# Patient Record
Sex: FEMALE | Race: WHITE | NOT HISPANIC OR LATINO | Employment: OTHER | URBAN - METROPOLITAN AREA
[De-identification: names, ages, dates, MRNs, and addresses within clinical notes are randomized per-mention and may not be internally consistent; named-entity substitution may affect disease eponyms.]

---

## 2018-06-27 PROBLEM — C78.6 OMENTAL METASTASIS: Status: ACTIVE | Noted: 2018-06-27

## 2018-06-27 PROBLEM — R18.0 MALIGNANT ASCITES: Status: ACTIVE | Noted: 2018-06-27

## 2018-08-06 PROBLEM — R45.89 ANXIETY ABOUT HEALTH: Status: ACTIVE | Noted: 2018-08-06

## 2018-08-06 PROBLEM — F41.8 ANXIETY ABOUT HEALTH: Status: ACTIVE | Noted: 2018-08-06

## 2018-08-07 PROBLEM — D70.1 CHEMOTHERAPY-INDUCED NEUTROPENIA: Status: ACTIVE | Noted: 2018-08-07

## 2018-08-09 PROBLEM — G89.3 CANCER RELATED PAIN: Status: ACTIVE | Noted: 2018-08-09

## 2019-02-07 PROBLEM — R10.30 LOWER ABDOMINAL PAIN: Status: ACTIVE | Noted: 2019-02-07

## 2019-08-06 PROBLEM — R10.30 LOWER ABDOMINAL PAIN: Status: RESOLVED | Noted: 2019-02-07 | Resolved: 2019-08-06

## 2019-11-13 PROBLEM — E55.9 VITAMIN D DEFICIENCY: Status: ACTIVE | Noted: 2018-02-05

## 2019-11-13 PROBLEM — E53.8 VITAMIN B12 DEFICIENCY: Status: ACTIVE | Noted: 2018-02-05

## 2019-11-13 PROBLEM — N28.9 RENAL INSUFFICIENCY: Status: ACTIVE | Noted: 2018-03-21

## 2019-11-13 PROBLEM — J06.0 ACUTE LARYNGOPHARYNGITIS: Status: ACTIVE | Noted: 2019-11-13

## 2019-11-13 PROBLEM — E78.2 MIXED HYPERLIPIDEMIA: Status: ACTIVE | Noted: 2018-03-21

## 2021-03-03 ENCOUNTER — TELEPHONE (OUTPATIENT)
Dept: GYNECOLOGIC ONCOLOGY | Facility: CLINIC | Age: 71
End: 2021-03-03

## 2021-03-03 NOTE — TELEPHONE ENCOUNTER
Caller: PATIENT    Relationship to patient:     Best call back number: [phone]    Chief complaint: PATIENT WAS SUPPOSE TO GET HER PORT OUT IN JAN, BUT DECLINE DUE TO JUST HAVING SURGERY, SHE WAS SUPPOSE TO GET A CALL BACK TO SCHEDULE AND HAS NOT, PLEASE ADVISE?    Type of visit:     Requested date: NEXT AVAILABLE    If rescheduling, when is the original appointment:     Additional notes:

## 2021-03-29 ENCOUNTER — TELEPHONE (OUTPATIENT)
Dept: ONCOLOGY | Facility: CLINIC | Age: 71
End: 2021-03-29

## 2021-03-29 NOTE — TELEPHONE ENCOUNTER
Caller: FABIAN ASHER    Relationship to patient: SELF    Best call back number: [phone]    Patient is needing: HAVING PORT TAKEN OUT 4-. PT WAS SUPPOSED TO HAVE COVID-19 TEST ON 4-9-2021 AND HAS NOT HEARD ANYTHING. CAN SHE GET A FOLLOW UP PHONE CALL TO GIVE HER THE DETAILS?

## 2021-03-30 NOTE — TELEPHONE ENCOUNTER
I had faxed the order to [facility]. I will re fax it and call them to confirm receipt.

## 2021-08-19 ENCOUNTER — TELEPHONE (OUTPATIENT)
Dept: GYNECOLOGIC ONCOLOGY | Facility: CLINIC | Age: 71
End: 2021-08-19

## 2021-08-19 DIAGNOSIS — C56.3 OVARIAN CANCER, BILATERAL: Primary | ICD-10-CM

## 2021-08-19 NOTE — TELEPHONE ENCOUNTER
RN called at spoke with pt.  Pt stated that she is having mild pain/discomfort in her LLQ.  Stated that is it just enough to know that it is there.  She knows that she has bowel issues and has been attributing it to that but then got worried about reoccurrence and wanted to know what she should be concerned about.    RN spoke with Tran NEWMAN who stated that she presented with pretty severe ascites and that if a reoccurrence was to happen that ascites would probably come back pretty quickly.  TRENT would like a Ca125 drawn to just check and can continue to plan from there.    RN relayed above recommendations and pt stated that she would be in [place] on Monday 8/23 and could come by the lab to get it drawn then.

## 2021-08-19 NOTE — TELEPHONE ENCOUNTER
Caller: Eugenia Tavares    Relationship: Self    Best call back number:[phone]    What is the best time to reach you:     ANYTIME     Who are you requesting to speak with (clinical staff, provider,  specific staff member): DR RAMIREZ NURSE    Do you know the name of the person who called: EUGENIA    What was the call regarding:   HAD OVARIAN CANCER AND HAVING A FEW THINGS NEEDS TO DISCUSS WITH THE NURSE    Do you require a callback: YES

## 2022-06-07 ENCOUNTER — TELEPHONE (OUTPATIENT)
Dept: GYNECOLOGIC ONCOLOGY | Facility: CLINIC | Age: 72
End: 2022-06-07

## 2022-06-07 NOTE — TELEPHONE ENCOUNTER
Caller: Eugenia Tavares    Relationship: Self    Best call back number: [phone]    What is the best time to reach you: ASAP    Who are you requesting to speak with (clinical staff, provider,  specific staff member): LEONOR, SAW HER LAST MONTH.    Do you know the name of the person who called: EUGENIA    What was the call regarding: EUGENIA STATES SHE IS CONCERNED ABOUT HER CA-125 & AFTER TALKING TO TYLER SHE FEELS LIKE SHE IS HAVING WEIRD FEELINGS IN HER ABDOMEN & IS INQUIRING IF SHE COULD GET A CT SCAN FOR PIECE OF MIND BEFORE SHE LEAVES FOR A TRIP TO [place] IN SEPT. BUT NEEDS TO PAY AHEAD OF TIME, GOING TO [place] AT THE END OF THIS MONTH.      Do you require a callback: YES, PLEASE TO DISCUSS.

## 2022-11-02 ENCOUNTER — TELEPHONE (OUTPATIENT)
Dept: GYNECOLOGIC ONCOLOGY | Facility: CLINIC | Age: 72
End: 2022-11-02

## 2022-11-02 NOTE — TELEPHONE ENCOUNTER
Pharmacy Name: [facility]     Pharmacy representative name: ELEAZAR    Pharmacy representative phone number: [phone]    What medication are you calling in regards to: POTASSIUM CHLORIDE    What question does the pharmacy have: NEED TO CLARIFY IF RX IS STILL NEEDED AND DOSAGE

## 2023-01-03 DIAGNOSIS — C56.3 OVARIAN CANCER, BILATERAL: Primary | ICD-10-CM

## 2023-01-04 ENCOUNTER — HOSPITAL ENCOUNTER (OUTPATIENT)
Dept: ONCOLOGY | Facility: HOSPITAL | Age: 73
Setting detail: INFUSION SERIES
Discharge: HOME | End: 2023-01-04
Payer: MEDICARE

## 2023-01-04 ENCOUNTER — LAB (OUTPATIENT)
Dept: LAB | Facility: HOSPITAL | Age: 73
End: 2023-01-04
Payer: MEDICARE

## 2023-01-04 ENCOUNTER — TELEPHONE (OUTPATIENT)
Dept: GYNECOLOGIC ONCOLOGY | Facility: CLINIC | Age: 73
End: 2023-01-04

## 2023-01-04 ENCOUNTER — OFFICE VISIT (OUTPATIENT)
Dept: GYNECOLOGIC ONCOLOGY | Facility: CLINIC | Age: 73
End: 2023-01-04
Payer: MEDICARE

## 2023-01-04 VITALS
TEMPERATURE: 97.3 F | OXYGEN SATURATION: 99 % | HEIGHT: 59 IN | RESPIRATION RATE: 20 BRPM | SYSTOLIC BLOOD PRESSURE: 142 MMHG | WEIGHT: 108.2 LBS | BODY MASS INDEX: 21.81 KG/M2 | DIASTOLIC BLOOD PRESSURE: 82 MMHG | HEART RATE: 88 BPM

## 2023-01-04 VITALS — DIASTOLIC BLOOD PRESSURE: 61 MMHG | SYSTOLIC BLOOD PRESSURE: 148 MMHG | HEART RATE: 66 BPM

## 2023-01-04 DIAGNOSIS — C56.3 OVARIAN CANCER, BILATERAL: ICD-10-CM

## 2023-01-04 DIAGNOSIS — G62.0 CHEMOTHERAPY-INDUCED NEUROPATHY: ICD-10-CM

## 2023-01-04 DIAGNOSIS — T45.1X5A CHEMOTHERAPY-INDUCED NEUROPATHY: ICD-10-CM

## 2023-01-04 DIAGNOSIS — R05.8 OTHER COUGH: ICD-10-CM

## 2023-01-04 DIAGNOSIS — C56.3 OVARIAN CANCER, BILATERAL: Primary | ICD-10-CM

## 2023-01-04 LAB
ALBUMIN SERPL-MCNC: 4.1 G/DL (ref 3.5–5.2)
ALBUMIN/GLOB SERPL: 1.1 G/DL
ALP SERPL-CCNC: 26 U/L (ref 39–117)
ALT SERPL W P-5'-P-CCNC: 16 U/L (ref 1–33)
ANION GAP SERPL CALCULATED.3IONS-SCNC: 11 MMOL/L (ref 5–15)
AST SERPL-CCNC: 18 U/L (ref 1–32)
BASOPHILS # BLD AUTO: 0.02 10*3/MM3 (ref 0–0.2)
BASOPHILS NFR BLD AUTO: 0.2 % (ref 0–1.5)
BILIRUB SERPL-MCNC: 0.2 MG/DL (ref 0–1.2)
BUN SERPL-MCNC: 11 MG/DL (ref 8–23)
BUN/CREAT SERPL: 15.5 (ref 7–25)
CALCIUM SPEC-SCNC: 9.9 MG/DL (ref 8.6–10.5)
CANCER AG125 SERPL QL: 26.9 U/ML (ref 0–38.1)
CHLORIDE SERPL-SCNC: 101 MMOL/L (ref 98–107)
CO2 SERPL-SCNC: 30 MMOL/L (ref 22–29)
CREAT SERPL-MCNC: 0.71 MG/DL (ref 0.57–1)
DEPRECATED RDW RBC AUTO: 45.9 FL (ref 37–54)
EGFRCR SERPLBLD CKD-EPI 2021: 90.5 ML/MIN/1.73
EOSINOPHIL # BLD AUTO: 0.11 10*3/MM3 (ref 0–0.4)
EOSINOPHIL NFR BLD AUTO: 1.2 % (ref 0.3–6.2)
ERYTHROCYTE [DISTWIDTH] IN BLOOD BY AUTOMATED COUNT: 13.5 % (ref 12.3–15.4)
GLOBULIN UR ELPH-MCNC: 3.9 GM/DL
GLUCOSE SERPL-MCNC: 101 MG/DL (ref 65–99)
HCT VFR BLD AUTO: 43.3 % (ref 34–46.6)
HGB BLD-MCNC: 14.1 G/DL (ref 12–15.9)
IMM GRANULOCYTES # BLD AUTO: 0.22 10*3/MM3 (ref 0–0.05)
IMM GRANULOCYTES NFR BLD AUTO: 2.5 % (ref 0–0.5)
LYMPHOCYTES # BLD AUTO: 1.65 10*3/MM3 (ref 0.7–3.1)
LYMPHOCYTES NFR BLD AUTO: 18.5 % (ref 19.6–45.3)
MCH RBC QN AUTO: 30.2 PG (ref 26.6–33)
MCHC RBC AUTO-ENTMCNC: 32.6 G/DL (ref 31.5–35.7)
MCV RBC AUTO: 92.7 FL (ref 79–97)
MONOCYTES # BLD AUTO: 0.65 10*3/MM3 (ref 0.1–0.9)
MONOCYTES NFR BLD AUTO: 7.3 % (ref 5–12)
NEUTROPHILS NFR BLD AUTO: 6.28 10*3/MM3 (ref 1.7–7)
NEUTROPHILS NFR BLD AUTO: 70.3 % (ref 42.7–76)
PLATELET # BLD AUTO: 292 10*3/MM3 (ref 140–450)
PMV BLD AUTO: 9.2 FL (ref 6–12)
POTASSIUM SERPL-SCNC: 4 MMOL/L (ref 3.5–5.2)
PROT SERPL-MCNC: 8 G/DL (ref 6–8.5)
RBC # BLD AUTO: 4.67 10*6/MM3 (ref 3.77–5.28)
SODIUM SERPL-SCNC: 142 MMOL/L (ref 136–145)
WBC NRBC COR # BLD: 8.93 10*3/MM3 (ref 3.4–10.8)

## 2023-01-04 PROCEDURE — 96417 CHEMO IV INFUS EACH ADDL SEQ: CPT

## 2023-01-04 PROCEDURE — 25010000002 FOSAPREPITANT PER 1 MG: Performed by: NURSE PRACTITIONER

## 2023-01-04 PROCEDURE — 96415 CHEMO IV INFUSION ADDL HR: CPT

## 2023-01-04 PROCEDURE — 96375 TX/PRO/DX INJ NEW DRUG ADDON: CPT

## 2023-01-04 PROCEDURE — 80053 COMPREHEN METABOLIC PANEL: CPT

## 2023-01-04 PROCEDURE — 25010000002 DIPHENHYDRAMINE PER 50 MG: Performed by: NURSE PRACTITIONER

## 2023-01-04 PROCEDURE — 25010000002 HEPARIN LOCK FLUSH PER 10 UNITS: Performed by: OBSTETRICS & GYNECOLOGY

## 2023-01-04 PROCEDURE — 25010000002 PALONOSETRON 0.25 MG/5ML SOLUTION PREFILLED SYRINGE: Performed by: NURSE PRACTITIONER

## 2023-01-04 PROCEDURE — 96413 CHEMO IV INFUSION 1 HR: CPT

## 2023-01-04 PROCEDURE — 25010000002 CARBOPLATIN PER 50 MG: Performed by: NURSE PRACTITIONER

## 2023-01-04 PROCEDURE — 25010000002 PACLITAXEL PER 1 MG: Performed by: NURSE PRACTITIONER

## 2023-01-04 PROCEDURE — 86304 IMMUNOASSAY TUMOR CA 125: CPT

## 2023-01-04 PROCEDURE — 99214 OFFICE O/P EST MOD 30 MIN: CPT | Performed by: NURSE PRACTITIONER

## 2023-01-04 PROCEDURE — 1159F MED LIST DOCD IN RCRD: CPT | Performed by: NURSE PRACTITIONER

## 2023-01-04 PROCEDURE — 25010000002 DEXAMETHASONE PER 1 MG: Performed by: NURSE PRACTITIONER

## 2023-01-04 PROCEDURE — 85025 COMPLETE CBC W/AUTO DIFF WBC: CPT

## 2023-01-04 PROCEDURE — 1126F AMNT PAIN NOTED NONE PRSNT: CPT | Performed by: NURSE PRACTITIONER

## 2023-01-04 PROCEDURE — 36415 COLL VENOUS BLD VENIPUNCTURE: CPT

## 2023-01-04 PROCEDURE — 96374 THER/PROPH/DIAG INJ IV PUSH: CPT

## 2023-01-04 PROCEDURE — 1160F RVW MEDS BY RX/DR IN RCRD: CPT | Performed by: NURSE PRACTITIONER

## 2023-01-04 PROCEDURE — 96365 THER/PROPH/DIAG IV INF INIT: CPT

## 2023-01-04 PROCEDURE — 96367 TX/PROPH/DG ADDL SEQ IV INF: CPT

## 2023-01-04 RX ORDER — HEPARIN SODIUM (PORCINE) LOCK FLUSH IV SOLN 100 UNIT/ML 100 UNIT/ML
500 SOLUTION INTRAVENOUS AS NEEDED
Status: DISCONTINUED | OUTPATIENT
Start: 2023-01-04 | End: 2023-01-05 | Stop reason: HOSPADM

## 2023-01-04 RX ORDER — GUAIFENESIN AND CODEINE PHOSPHATE 100; 10 MG/5ML; MG/5ML
5 SOLUTION ORAL 3 TIMES DAILY PRN
Qty: 118 ML | Refills: 0 | Status: SHIPPED | OUTPATIENT
Start: 2023-01-04

## 2023-01-04 RX ORDER — PALONOSETRON 0.05 MG/ML
0.25 INJECTION, SOLUTION INTRAVENOUS ONCE
Status: COMPLETED | OUTPATIENT
Start: 2023-01-04 | End: 2023-01-04

## 2023-01-04 RX ORDER — SODIUM CHLORIDE 9 MG/ML
250 INJECTION, SOLUTION INTRAVENOUS ONCE
Status: CANCELLED | OUTPATIENT
Start: 2023-01-04

## 2023-01-04 RX ORDER — LIDOCAINE AND PRILOCAINE 25; 25 MG/G; MG/G
CREAM TOPICAL
COMMUNITY
Start: 2022-12-13

## 2023-01-04 RX ORDER — HEPARIN SODIUM (PORCINE) LOCK FLUSH IV SOLN 100 UNIT/ML 100 UNIT/ML
500 SOLUTION INTRAVENOUS AS NEEDED
Status: CANCELLED | OUTPATIENT
Start: 2023-04-01

## 2023-01-04 RX ORDER — OLANZAPINE 5 MG/1
5 TABLET ORAL ONCE
Status: COMPLETED | OUTPATIENT
Start: 2023-01-04 | End: 2023-01-04

## 2023-01-04 RX ORDER — OLANZAPINE 5 MG/1
5 TABLET ORAL ONCE
Status: CANCELLED | OUTPATIENT
Start: 2023-01-04 | End: 2023-01-04

## 2023-01-04 RX ORDER — FAMOTIDINE 10 MG/ML
20 INJECTION, SOLUTION INTRAVENOUS AS NEEDED
Status: CANCELLED | OUTPATIENT
Start: 2023-01-04

## 2023-01-04 RX ORDER — SODIUM CHLORIDE 9 MG/ML
250 INJECTION, SOLUTION INTRAVENOUS ONCE
Status: COMPLETED | OUTPATIENT
Start: 2023-01-04 | End: 2023-01-04

## 2023-01-04 RX ORDER — DIPHENHYDRAMINE HYDROCHLORIDE 50 MG/ML
50 INJECTION INTRAMUSCULAR; INTRAVENOUS AS NEEDED
Status: CANCELLED | OUTPATIENT
Start: 2023-01-04

## 2023-01-04 RX ORDER — FAMOTIDINE 10 MG/ML
20 INJECTION, SOLUTION INTRAVENOUS ONCE
Status: COMPLETED | OUTPATIENT
Start: 2023-01-04 | End: 2023-01-04

## 2023-01-04 RX ORDER — FAMOTIDINE 10 MG/ML
20 INJECTION, SOLUTION INTRAVENOUS ONCE
Status: CANCELLED | OUTPATIENT
Start: 2023-01-04

## 2023-01-04 RX ORDER — SODIUM CHLORIDE 0.9 % (FLUSH) 0.9 %
10 SYRINGE (ML) INJECTION AS NEEDED
Status: CANCELLED | OUTPATIENT
Start: 2023-04-01

## 2023-01-04 RX ORDER — PALONOSETRON 0.05 MG/ML
0.25 INJECTION, SOLUTION INTRAVENOUS ONCE
Status: CANCELLED | OUTPATIENT
Start: 2023-01-04

## 2023-01-04 RX ADMIN — DIPHENHYDRAMINE HYDROCHLORIDE 50 MG: 50 INJECTION INTRAMUSCULAR; INTRAVENOUS at 10:30

## 2023-01-04 RX ADMIN — HEPARIN 500 UNITS: 100 SYRINGE at 15:19

## 2023-01-04 RX ADMIN — SODIUM CHLORIDE 250 ML: 9 INJECTION, SOLUTION INTRAVENOUS at 10:29

## 2023-01-04 RX ADMIN — DEXAMETHASONE SODIUM PHOSPHATE 20 MG: 4 INJECTION, SOLUTION INTRAMUSCULAR; INTRAVENOUS at 10:45

## 2023-01-04 RX ADMIN — OLANZAPINE 5 MG: 5 TABLET, FILM COATED ORAL at 10:44

## 2023-01-04 RX ADMIN — PACLITAXEL 245 MG: 6 INJECTION, SOLUTION INTRAVENOUS at 11:35

## 2023-01-04 RX ADMIN — CARBOPLATIN 380 MG: 600 INJECTION, SOLUTION INTRAVENOUS at 14:45

## 2023-01-04 RX ADMIN — PALONOSETRON 0.25 MG: 0.25 INJECTION, SOLUTION INTRAVENOUS at 11:03

## 2023-01-04 RX ADMIN — FOSAPREPITANT DIMEGLUMINE 150 MG: 150 INJECTION, POWDER, LYOPHILIZED, FOR SOLUTION INTRAVENOUS at 10:29

## 2023-01-04 RX ADMIN — FAMOTIDINE 20 MG: 10 INJECTION INTRAVENOUS at 11:06

## 2023-01-04 NOTE — TELEPHONE ENCOUNTER
----- Message from TRENT Combs sent at 1/4/2023 12:17 PM EST -----  Please notify patient Ca-125 back within normal limits. Good news! Thanks

## 2023-01-04 NOTE — PROGRESS NOTES
GYN ONCOLOGY FOLLOW-UP    Eugenia Tavares  [id number]  1950    Subjective   Chief Complaint: Ovarian Cancer and Chemotherapy        History of present illness:       Eugenia Tavares is a 72 y.o. year old female who is here today for recurrent ovarian cancer, biopsy-proven thoracic lymph node metastasis, chemotherapy toxicity evaluation, and consideration of ongoing treatment. She underwent her first cycle of second-line carboplatin + paclitaxel for recurrence on 12/13/2022. She presents today for consideration of cycle #2 day 1 of carbo/taxol, pending evaluation and labs. She has a port-a-cath in place. She is accompanied by her  and daughter today.     Patient reports having a respiratory illness in early November 2022, was treated with IM and PO antibiotics by her PCP. She reports her cough lingered, but she felt better until she began to feel poorly again over [place]. She describes worsening cough and drainage. Clinical staff recommended Mucinex DM which has helped and she is feeling some better. She has used Tessalon a few times, unsure if this this helping cough or now. She inquires about other options for cough treatment and what to do if symptoms return again.   Otherwise, she notes good appetite. Denies nausea and vomiting. Bowels and bladder normal. Neuropathy is overall stable. She reports some mild leg pain for a few days after treatment, but this was tolerable and has not persisted.       Oncology History:    Oncology/Hematology History   Ovarian cancer, bilateral (HCC)   6/22/2018 Initial Diagnosis    Abnormal examination by GI for patient c/o bloating and diarrhea, sent to GYN. TVUS concerning for malignancy. CT showed multiple cystic masses on bilateral ovaries, possible omental implants, and large volume ascites. Paracentesis performed and cytology returned positive for metastatic adenocarcinoma, favor GYN origin. CA-125 at diagnosis = 907.7. Referred to Gyn Oncology.      6/28/2018  Procedure    Port-a-cath placement     7/6/2018 - 8/16/2018 Chemotherapy    OP OVARIAN PACLitaxel / CARBOplatin (Q21D)  Neoadjuvant x 3 cycles     9/6/2018 Imaging    Interval CT showed improvement in ascites and resolution of previous left pleural effusion     9/11/2018 Surgery    Exploratory laparotomy, EMMA/BSO, debulking to R=0, and partial rectal resection. 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 Primary tumor site thought to be left fallopian tube. Bilateral ovarian, tubal, and surface involvement. Omentum, pelvic peritoneum, colon serosa, and rectal serosa involved. Macroscopic extrapelvic peritoneal implants also found. Stage IIIB grade 3         10/3/2018 - 11/29/2018 Chemotherapy    OP OVARIAN PACLitaxel / CARBOplatin AUC=6 / Bevacizumab 15 mg/kg  3 cycles adjuvant chemotherapy planned.     Patient chose to decline Avastin with all adjuvant cycles.     10/19/2018 Genetic Testing    Counseling and testing completed via [employer] panel. 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 Patient does not have disease, but is a carrier.     1/3/2019 Imaging    Post-treatment CT chest, abdomen, and pelvis. No ascites, nodularity, or residual disease seen. No evidence of progression or active malignancy.      3/21/2019 Imaging    CT chest, abdomen, pelvis for diffuse abdominal pain. Study negative for recurrent disease     12/29/2020 Imaging    CT chest, abdomen, pelvis prior to port removal:  Stable examination with no CT evidence of acute intrathoracic, intra-abdominal or pelvic abnormality. No evidence of progression or metastatic disease.     9/10/2021 Imaging    CT chest, abdomen, pelvis:  Stable appearance of the chest abdomen and pelvis without acute pathology or evidence for occurrence or active metastatic disease     6/22/2022 Imaging    CT chest, abdomen, pelvis:  No evidence of metastatic  disease within the abdomen or pelvis. Slowly enlarging bilateral pericardial fat pad lymph notes. Findings  are nonspecific given the slow progression since 2020, but are  concerning for metastatic disease or lymphoma.     8/22/2022 Progression    CT biopsy left pericardial lymph node. 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     9/20/2022 Molecular Testing    CARIS results:    PD-L1 positive  ER positive 2+, 75%  MSI stable, MMR proficient, MEGAN low, TMB low  PAMELA, BRCA 1/2, RAD51 C/D all negative  NE negative  Her2/Kike negative     12/13/2022 -  Chemotherapy    OP OVARIAN PACLitaxel / CARBOplatin (Q21D)         The current medication list and allergy list were reviewed and reconciled.     Past Medical History, Past Surgical History, Social History, Family History have been reviewed and are without significant changes except as mentioned.    Review of Systems   Constitutional: Negative.    Gastrointestinal: Negative.    Genitourinary: Negative.        Objective   Physical Exam  Vital Signs: /82   Pulse 88   Temp 97.3 °F (36.3 °C)   Resp 20   Ht 149.9 cm (59.02\")   Wt 49.1 kg (108 lb 3.2 oz)   SpO2 99%   BMI 21.84 kg/m²   Wt Readings from Last 3 Encounters:   01/04/23 49.1 kg (108 lb 3.2 oz)   12/13/22 49.4 kg (109 lb)   11/30/22 48.2 kg (106 lb 4.8 oz)     Vitals:    01/04/23 0901   PainSc: 0-No pain           General Appearance:  alert, cooperative, no apparent distress, appears stated age and normal weight   Neurologic/Psychiatric: A&O x 3, gait steady, appropriate affect   Lungs:   Clear to auscultation bilaterally; respirations regular, even, and unlabored bilaterally   Heart:  Regular rate and rhythm, no murmurs appreciated   Skin: No lesions, rashes, or discoloration   Extremities: Normal, atraumatic; no clubbing, cyanosis, or edema    Pelvic: deferred     ECOG score: 1             Result Review :   The following data was reviewed by: TRENT Combs on  01/04/2023:  CMP    CMP 11/28/22 12/12/22 1/4/23   Glucose  210 (A) 101 (A)   BUN  10 11   Creatinine 1.00 0.76 0.71   eGFR  83.4 90.5   Sodium  138 142   Potassium  4.4 4.0   Chloride  105 101   Calcium  9.1 9.9   Total Protein  6.9 8.0   Albumin  3.80 4.1   Globulin  3.1 3.9   Total Bilirubin  <0.2 0.2   Alkaline Phosphatase  21 (A) 26 (A)   AST (SGOT)  21 18   ALT (SGPT)  13 16   Albumin/Globulin Ratio  1.2 1.1   BUN/Creatinine Ratio  13.2 15.5   Anion Gap  9.4 11.0   (A) Abnormal value       Comments are available for some flowsheets but are not being displayed.           CBC w/diff    CBC w/Diff 8/22/22 12/12/22 1/4/23   WBC 5.79 8.82 8.93   RBC 4.58 4.49 4.67   Hemoglobin 14.2 13.6 14.1   Hematocrit 41.3 41.2 43.3   MCV 90.2 91.8 92.7   MCH 31.0 30.3 30.2   MCHC 34.4 33.0 32.6   RDW 12.7 13.8 13.5   Platelets 249 244 292   Neutrophil Rel % 62.2 94.4 (A) 70.3   Immature Granulocyte Rel % 0.3 0.3 2.5 (A)   Lymphocyte Rel % 27.3 4.3 (A) 18.5 (A)   Monocyte Rel % 8.3 0.9 (A) 7.3   Eosinophil Rel % 1.6 0.0 (A) 1.2   Basophil Rel % 0.3 0.1 0.2   (A) Abnormal value            Data reviewed: CARIS results   Last imaging study was CT chest, abdomen, pelvis 6/22/2022.   Tumor marker:     Date Value Ref Range Status   12/12/2022 46.9 (H) 0.0 - 38.1 U/mL Final   11/02/2022 42.4 (H) 0.0 - 38.1 U/mL Final   09/28/2022 40.5 (H) 0.0 - 38.1 U/mL Final         Procedure Notes:              Assessment and Plan:    Diagnoses and all orders for this visit:    1. Ovarian cancer, bilateral (HCC) (Primary)    2. Chemotherapy-induced neuropathy (HCC)    3. Other cough  -     guaiFENesin-codeine (GUAIFENESIN AC) 100-10 MG/5ML liquid; Take 5 mL by mouth 3 (Three) Times a Day As Needed for Cough.  Dispense: 118 mL; Refill: 0        This is a 72 y.o. woman with recurrent ovarian cancer, metastatic to thoracic lymph nodes, currently undergoing second-line chemotherapy with carboplatin + paclitaxel IV q 21 days      Recurrent Ovarian  Cancer  -initial diagnosis in 2018  -first line treatment included 3 cycles neoadjuvant carbo/taxol, interval optimal debulking, and 3 cycles adjuvant carbo/taxol  -recurrence found in 6/2022 in thoracic lymph nodes, biopsy proven in 8/2022  -platinum-sensitive, so initiated second line carbo/taxol on 12/13/2022  -tolerating treatment well overall thus far  -labs reviewed. OK to proceed with cycle #2 today. No dose reduction at this time.     Cough  -off and on since suffering URI in early 11/2022.   -continue Mucinex DM and Tessalon PRN  -recommended cough syrup for sleep and PRN. Rx for guaifenesin-codeine sent to local pharmacy. We reviewed medication instructions, risks, benefits, and potential side effects.   -encouraged patient and family to monitor for recurrent URI symptoms, fever, worsening cough. If new symptoms will check interval labs, repeat chest imaging    Chemotherapy-induced neuropathy  -mild at baseline since chemotherapy in 2018  -leg aching for a few days after infusion only  -recommended Claritin PRN leg/bone/joint pain      Pain assessment was performed today as a part of patient’s care. For patients with pain related to surgery, gynecologic malignancy or cancer treatment, the plan is as noted in the assessment/plan.  For patients with pain not related to these issues, they are to seek any further needed care from a more appropriate provider, such as PCP.      Follow-up:    Return to clinic in 3 weeks for chemotherapy toxicity evaluation.      Electronically signed by TRENT Combs on 01/04/23 at 12:36 EST

## 2023-01-05 ENCOUNTER — TELEPHONE (OUTPATIENT)
Dept: GYNECOLOGIC ONCOLOGY | Facility: CLINIC | Age: 73
End: 2023-01-05
Payer: MEDICARE

## 2023-01-05 NOTE — TELEPHONE ENCOUNTER
RN called pt w/ results.  Pt v/u.  Pt asked if she could put off her breast US/mamm until she was through treatment or at least a couple months.  RN spoke with Tran NEWMAN who stated it was ok to put it off a couple of months but to still get it done this year.  RN relayed message and pt v/u.

## 2023-01-23 DIAGNOSIS — C56.3 OVARIAN CANCER, BILATERAL: Primary | ICD-10-CM

## 2023-01-24 ENCOUNTER — TELEPHONE (OUTPATIENT)
Dept: GYNECOLOGIC ONCOLOGY | Facility: CLINIC | Age: 73
End: 2023-01-24
Payer: MEDICARE

## 2023-01-24 NOTE — TELEPHONE ENCOUNTER
Caller: Eugenia Tavares    Relationship: Self    Best call back number: [phone]    What is the best time to reach you: ANYTIME    Who are you requesting to speak with (clinical staff, provider,  specific staff member): ANTOINETTE       What was the call regarding: HAS APPT TOMORROW NEEDING TO KNOW IF NEEDS TO GO TO THE LAB OR INFUSION CENTER FOR BLOOD DRAW FIRST?    Do you require a callback: YES

## 2023-01-24 NOTE — PROGRESS NOTES
Eugenia CHRISTIE [facility]  [id number]  1950      Reason for visit: History of ovarian cancer now with biopsy-proven lymph node recurrence in the chest, chemotherapy consideration and toxicity assessment.     History of present illness:  The patient is a 72 y.o. year old female who presents today for treatment and evaluation of the above issues.    She is 4 years out from completion of treatment. Last  26.9 on 1/4. Had CT scan on 6/22 which showed slowly enlarging bilateral pericardial fat pad lymph notes. 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      She presents today for consideration for cycle #3 of Carboplatin/Paclitaxel. She reports tolerating the last cycle very well and has good energy. Her neuropathy in the feet and toes is stable and not significantly affecting her balance. She is going for walks most days. Denies chest pain, shortness of air, changes in bowel/bladder function, appetite, issues with her port. She and her  have questions regarding Bevacizumab.     Oncologic History:  Oncology/Hematology History   Ovarian cancer, bilateral (HCC)   6/22/2018 Initial Diagnosis    Abnormal examination by GI for patient c/o bloating and diarrhea, sent to GYN. TVUS concerning for malignancy. CT showed multiple cystic masses on bilateral ovaries, possible omental implants, and large volume ascites. Paracentesis performed and cytology returned positive for metastatic adenocarcinoma, favor GYN origin. CA-125 at diagnosis = 907.7. Referred to Gyn Oncology.      6/28/2018 Procedure    Port-a-cath placement     7/6/2018 - 8/16/2018 Chemotherapy    OP OVARIAN PACLitaxel / CARBOplatin (Q21D)  Neoadjuvant x 3 cycles     9/6/2018 Imaging    Interval CT showed improvement in ascites and resolution of previous left pleural effusion     9/11/2018 Surgery    Exploratory laparotomy, EMMA/BSO, debulking to R=0, and  partial rectal resection. 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 Primary tumor site thought to be left fallopian tube. Bilateral ovarian, tubal, and surface involvement. Omentum, pelvic peritoneum, colon serosa, and rectal serosa involved. Macroscopic extrapelvic peritoneal implants also found. Stage IIIB grade 3         10/3/2018 - 11/29/2018 Chemotherapy    OP OVARIAN PACLitaxel / CARBOplatin AUC=6 / Bevacizumab 15 mg/kg  3 cycles adjuvant chemotherapy planned.     Patient chose to decline Avastin with all adjuvant cycles.     10/19/2018 Genetic Testing    Counseling and testing completed via CancerNext panel. 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 Patient does not have disease, but is a carrier.     1/3/2019 Imaging    Post-treatment CT chest, abdomen, and pelvis. No ascites, nodularity, or residual disease seen. No evidence of progression or active malignancy.      3/21/2019 Imaging    CT chest, abdomen, pelvis for diffuse abdominal pain. Study negative for recurrent disease     12/29/2020 Imaging    CT chest, abdomen, pelvis prior to port removal:  Stable examination with no CT evidence of acute intrathoracic, intra-abdominal or pelvic abnormality. No evidence of progression or metastatic disease.     9/10/2021 Imaging    CT chest, abdomen, pelvis:  Stable appearance of the chest abdomen and pelvis without acute pathology or evidence for occurrence or active metastatic disease     6/22/2022 Imaging    CT chest, abdomen, pelvis:  No evidence of metastatic disease within the abdomen or pelvis. Slowly enlarging bilateral pericardial fat pad lymph notes. Findings  are nonspecific given the slow progression since 2020, but are  concerning for metastatic disease or lymphoma.     8/22/2022 Progression    CT biopsy left pericardial lymph node. Pathology returned compatible with papillary serous  carcinoma.     9/20/2022 Molecular Testing    CARIS results:    PD-L1 positive  ER positive 2+, 75%  MSI stable, MMR proficient, MEGAN low, TMB low  PAMELA, BRCA 1/2, RAD51 C/D all negative  OK negative  Her2/Kike negative     12/13/2022 -  Chemotherapy    OP OVARIAN PACLitaxel / CARBOplatin (Q21D)           Past Medical History:   Diagnosis Date   • Hypertension    • Ovarian cancer (HCC) 2018    chemotherapy    • Wears glasses        Past Surgical History:   Procedure Laterality Date   • APPENDECTOMY      possibly with tubal (not certain)   • BREAST BIOPSY Bilateral 1972    cysts removed    • COLONOSCOPY     • EXPLORATORY LAPAROTOMY, TOTAL ABDOMINAL HYSTERECTOMY SALPINGO OOPHORECTOMY N/A 09/11/2018    Procedure: LAPAROTOMY EXPLORATORY, TOTAL ABDOMINAL HYSTERECTOMY, BILATERAL SALPINGO OOPHORECTOMY, DEBULKING;  Surgeon: Shannan Bess MD;  Location: [facility];  Service: Gynecology   • HERNIA REPAIR Right     inguinal    • SUBLINGUAL SALIVARY CYST EXCISION Right 2020    at [facility]   • TUBAL ABDOMINAL LIGATION         MEDICATIONS: The current medication list was reviewed with the patient and updated in the EMR this date per the Medical Assistant. Medication dosages and frequencies were confirmed to be accurate.      Allergies:  has No Known Allergies.    Social History:   Social History     Socioeconomic History   • Marital status:    Tobacco Use   • Smoking status: Never   • Smokeless tobacco: Never   Vaping Use   • Vaping Use: Never used   Substance and Sexual Activity   • Alcohol use: No   • Drug use: No   • Sexual activity: Defer       Family History:    Family History   Adopted: Yes   Family history unknown: Yes       Health Maintenance:    Health Maintenance   Topic Date Due   • Pneumococcal Vaccine 65+ (1 - PCV) Never done   • TDAP/TD VACCINES (1 - Tdap) Never done   • ZOSTER VACCINE (1 of 2) Never done   • HEPATITIS C SCREENING  Never done   • ANNUAL WELLNESS VISIT  Never done   • LIPID PANEL  11/03/2021   •  COVID-19 Vaccine (4 - Booster for Pfizer series) 11/22/2021   • INFLUENZA VACCINE  08/01/2022   • DXA SCAN  03/22/2023   • MAMMOGRAM  01/27/2024   • COLORECTAL CANCER SCREENING  04/24/2028       Review of Systems  Please refer to history of present illness, review of systems otherwise negative.    Physical Exam    Vitals:    01/25/23 0929   BP: 164/87   Pulse: 77   Temp: 97.8 °F (36.6 °C)   TempSrc: Temporal   SpO2: 98%   Weight: 49.4 kg (108 lb 12.8 oz)   PainSc: 0-No pain       Body mass index is 21.96 kg/m².  Wt Readings from Last 3 Encounters:   01/25/23 49.4 kg (108 lb 12.8 oz)   01/04/23 49.1 kg (108 lb 3.2 oz)   12/13/22 49.4 kg (109 lb)       GENERAL: Alert, well-appearing female appearing her stated age who is in no apparent distress.   HEENT: Sclera anicteric. Head normocephalic, atraumatic.  Wearing mask.  NECK: Deferred  BREASTS: Deferred  CARDIOVASCULAR: Normal rate, regular rhythm.  No murmurs, rubs, gallops.  No peripheral edema.  RESPIRATORY: Lungs clear to auscultation bilaterally, normal respiratory effort on room air  BACK: Deferred  GASTROINTESTINAL: Deferred, no distention  SKIN:  Warm, dry, well-perfused.  All visible areas intact.  No rashes, lesions, ulcers.  PSYCHIATRIC: AO x3, with appropriate affect, normal thought processes.  Mood and affect appropriate.  NEUROLOGIC: No focal deficits.  Moves extremities well.  MUSCULOSKELETAL: Normal gait and station.   EXTREMITIES:   No cyanosis, clubbing, symmetric.  LYMPHATICS: Deferred     PELVIC exam:    Deferred    ECOG PS 0    PROCEDURES: None    Diagnostic Data:     No radiology results for the last 30 days.        Lab Results   Component Value Date    WBC 4.54 01/25/2023    HGB 12.9 01/25/2023    HCT 40.2 01/25/2023    MCV 94.6 01/25/2023     01/25/2023    NEUTROABS 2.32 01/25/2023    GLUCOSE 100 (H) 01/25/2023    BUN 7 (L) 01/25/2023    CREATININE 0.77 01/25/2023    EGFRIFNONA 70 11/29/2018     01/25/2023    K 4.3 01/25/2023    CL  103 01/25/2023    CO2 26.0 01/25/2023    MG 2.1 06/20/2018    PHOS 4.6 06/20/2018    CALCIUM 9.3 01/25/2023    ALBUMIN 3.9 01/25/2023    AST 22 01/25/2023    ALT 21 01/25/2023    BILITOT 0.3 01/25/2023     Lab Results   Component Value Date     26.9 01/04/2023     46.9 (H) 12/12/2022     42.4 (H) 11/02/2022     40.5 (H) 09/28/2022     33.2 05/17/2022     25.7 11/17/2021     30.5 08/23/2021     24.7 05/18/2021     21.6 02/09/2021     20.8 11/17/2020         Assessment & Plan   This is a 72 y.o. 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    Encounter Diagnoses   Name Primary?   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 OK to proceed with cycle #3 today. No dose reduction at this time.   - Additionally, we discussed Bevavizumab. Reviewed that adding Tiffani and continuing it as a maintenance therapy (for up to one year) has been shown to prolong the disease free interval when combined with cytotoxic chemotherapy as a second treatment regimen for recurrent ovarian cancer.  Typical toxicities were discussed including runny nose, nose bleeds, change in mental status, HTN, proteinuria, and bowel perforation. Patient desires to add Tiffani to her treatment plan and will start it with her next cycle.      Chemotherapy-induced neuropathy  -mild at baseline since chemotherapy in 2018  -stable      Pain assessment was performed today as a part of patient’s care. For patients with pain related to surgery, gynecologic  malignancy or cancer treatment, the plan is as noted in the assessment/plan.  For patients with pain not related to these issues, they are to seek any further needed care from a more appropriate provider, such as PCP.     Pain assessment was performed today as a part of patient’s care.  For patients with pain related to surgery, gynecologic malignancy or cancer treatment, the plan is as noted in the assessment/plan.  For patients with pain not related to these issues, they are to seek any further needed care from a more appropriate provider, such as PCP.      No orders of the defined types were placed in this encounter.    FOLLOW UP: 3 weeks    Fabiola Johnson MD  01/25/23  13:59 EDT    I spent 35 minutes caring for Eugenia on this date of service. This time includes time spent by me in the following activities: preparing for the visit, reviewing tests, performing a medically appropriate examination and/or evaluation, counseling and educating the patient/family/caregiver, ordering medications, tests, or procedures and documenting information in the medical record    Patient was seen and examined with Dr. Johnson,  resident, who performed portions of the examination and documentation for this patient's care under my direct supervision.  I agree with the above documentation and plan.    Shannan Bess MD  01/25/23  12:29 EST

## 2023-01-25 ENCOUNTER — APPOINTMENT (OUTPATIENT)
Dept: ONCOLOGY | Facility: HOSPITAL | Age: 73
End: 2023-01-25
Payer: MEDICARE

## 2023-01-25 ENCOUNTER — OFFICE VISIT (OUTPATIENT)
Dept: GYNECOLOGIC ONCOLOGY | Facility: CLINIC | Age: 73
End: 2023-01-25
Payer: MEDICARE

## 2023-01-25 ENCOUNTER — HOSPITAL ENCOUNTER (OUTPATIENT)
Dept: ONCOLOGY | Facility: HOSPITAL | Age: 73
Setting detail: INFUSION SERIES
Discharge: HOME | End: 2023-01-25
Payer: MEDICARE

## 2023-01-25 VITALS
BODY MASS INDEX: 21.96 KG/M2 | TEMPERATURE: 97.8 F | WEIGHT: 108.8 LBS | SYSTOLIC BLOOD PRESSURE: 164 MMHG | HEART RATE: 77 BPM | DIASTOLIC BLOOD PRESSURE: 87 MMHG | OXYGEN SATURATION: 98 %

## 2023-01-25 VITALS
HEIGHT: 59 IN | SYSTOLIC BLOOD PRESSURE: 135 MMHG | BODY MASS INDEX: 21.97 KG/M2 | HEART RATE: 71 BPM | DIASTOLIC BLOOD PRESSURE: 65 MMHG

## 2023-01-25 DIAGNOSIS — C77.1 METASTASIS TO MEDIASTINAL LYMPH NODE: ICD-10-CM

## 2023-01-25 DIAGNOSIS — C56.3 OVARIAN CANCER, BILATERAL: Primary | ICD-10-CM

## 2023-01-25 LAB
ALBUMIN SERPL-MCNC: 3.9 G/DL (ref 3.5–5.2)
ALBUMIN/GLOB SERPL: 1.3 G/DL
ALP SERPL-CCNC: 28 U/L (ref 39–117)
ALT SERPL W P-5'-P-CCNC: 21 U/L (ref 1–33)
ANION GAP SERPL CALCULATED.3IONS-SCNC: 10 MMOL/L (ref 5–15)
AST SERPL-CCNC: 22 U/L (ref 1–32)
BASOPHILS # BLD AUTO: 0.01 10*3/MM3 (ref 0–0.2)
BASOPHILS NFR BLD AUTO: 0.2 % (ref 0–1.5)
BILIRUB SERPL-MCNC: 0.3 MG/DL (ref 0–1.2)
BUN SERPL-MCNC: 7 MG/DL (ref 8–23)
BUN/CREAT SERPL: 9.1 (ref 7–25)
CALCIUM SPEC-SCNC: 9.3 MG/DL (ref 8.6–10.5)
CANCER AG125 SERPL QL: 21.8 U/ML (ref 0–38.1)
CHLORIDE SERPL-SCNC: 103 MMOL/L (ref 98–107)
CO2 SERPL-SCNC: 26 MMOL/L (ref 22–29)
CREAT SERPL-MCNC: 0.77 MG/DL (ref 0.57–1)
DEPRECATED RDW RBC AUTO: 52.3 FL (ref 37–54)
EGFRCR SERPLBLD CKD-EPI 2021: 82.1 ML/MIN/1.73
EOSINOPHIL # BLD AUTO: 0.11 10*3/MM3 (ref 0–0.4)
EOSINOPHIL NFR BLD AUTO: 2.4 % (ref 0.3–6.2)
ERYTHROCYTE [DISTWIDTH] IN BLOOD BY AUTOMATED COUNT: 14.8 % (ref 12.3–15.4)
GLOBULIN UR ELPH-MCNC: 3.1 GM/DL
GLUCOSE SERPL-MCNC: 100 MG/DL (ref 65–99)
HCT VFR BLD AUTO: 40.2 % (ref 34–46.6)
HGB BLD-MCNC: 12.9 G/DL (ref 12–15.9)
IMM GRANULOCYTES # BLD AUTO: 0 10*3/MM3 (ref 0–0.05)
IMM GRANULOCYTES NFR BLD AUTO: 0 % (ref 0–0.5)
LYMPHOCYTES # BLD AUTO: 1.58 10*3/MM3 (ref 0.7–3.1)
LYMPHOCYTES NFR BLD AUTO: 34.8 % (ref 19.6–45.3)
MCH RBC QN AUTO: 30.4 PG (ref 26.6–33)
MCHC RBC AUTO-ENTMCNC: 32.1 G/DL (ref 31.5–35.7)
MCV RBC AUTO: 94.6 FL (ref 79–97)
MONOCYTES # BLD AUTO: 0.52 10*3/MM3 (ref 0.1–0.9)
MONOCYTES NFR BLD AUTO: 11.5 % (ref 5–12)
NEUTROPHILS NFR BLD AUTO: 2.32 10*3/MM3 (ref 1.7–7)
NEUTROPHILS NFR BLD AUTO: 51.1 % (ref 42.7–76)
PLATELET # BLD AUTO: 211 10*3/MM3 (ref 140–450)
PMV BLD AUTO: 9.9 FL (ref 6–12)
POTASSIUM SERPL-SCNC: 4.3 MMOL/L (ref 3.5–5.2)
PROT SERPL-MCNC: 7 G/DL (ref 6–8.5)
RBC # BLD AUTO: 4.25 10*6/MM3 (ref 3.77–5.28)
SODIUM SERPL-SCNC: 139 MMOL/L (ref 136–145)
WBC NRBC COR # BLD: 4.54 10*3/MM3 (ref 3.4–10.8)

## 2023-01-25 PROCEDURE — 25010000002 HEPARIN LOCK FLUSH PER 10 UNITS: Performed by: OBSTETRICS & GYNECOLOGY

## 2023-01-25 PROCEDURE — 25010000002 FOSAPREPITANT PER 1 MG: Performed by: OBSTETRICS & GYNECOLOGY

## 2023-01-25 PROCEDURE — 25010000002 DEXAMETHASONE SODIUM PHOSPHATE 100 MG/10ML SOLUTION: Performed by: OBSTETRICS & GYNECOLOGY

## 2023-01-25 PROCEDURE — 99214 OFFICE O/P EST MOD 30 MIN: CPT | Performed by: OBSTETRICS & GYNECOLOGY

## 2023-01-25 PROCEDURE — 96413 CHEMO IV INFUSION 1 HR: CPT

## 2023-01-25 PROCEDURE — 96366 THER/PROPH/DIAG IV INF ADDON: CPT

## 2023-01-25 PROCEDURE — 96367 TX/PROPH/DG ADDL SEQ IV INF: CPT

## 2023-01-25 PROCEDURE — 96368 THER/DIAG CONCURRENT INF: CPT

## 2023-01-25 PROCEDURE — 86304 IMMUNOASSAY TUMOR CA 125: CPT | Performed by: OBSTETRICS & GYNECOLOGY

## 2023-01-25 PROCEDURE — 25010000002 DIPHENHYDRAMINE PER 50 MG: Performed by: OBSTETRICS & GYNECOLOGY

## 2023-01-25 PROCEDURE — 25010000002 PACLITAXEL PER 1 MG: Performed by: OBSTETRICS & GYNECOLOGY

## 2023-01-25 PROCEDURE — 96375 TX/PRO/DX INJ NEW DRUG ADDON: CPT

## 2023-01-25 PROCEDURE — 85025 COMPLETE CBC W/AUTO DIFF WBC: CPT | Performed by: OBSTETRICS & GYNECOLOGY

## 2023-01-25 PROCEDURE — 96417 CHEMO IV INFUS EACH ADDL SEQ: CPT

## 2023-01-25 PROCEDURE — 25010000002 PALONOSETRON 0.25 MG/5ML SOLUTION PREFILLED SYRINGE: Performed by: OBSTETRICS & GYNECOLOGY

## 2023-01-25 PROCEDURE — 96415 CHEMO IV INFUSION ADDL HR: CPT

## 2023-01-25 PROCEDURE — 25010000002 CARBOPLATIN PER 50 MG: Performed by: OBSTETRICS & GYNECOLOGY

## 2023-01-25 PROCEDURE — 80053 COMPREHEN METABOLIC PANEL: CPT | Performed by: OBSTETRICS & GYNECOLOGY

## 2023-01-25 RX ORDER — FAMOTIDINE 10 MG/ML
20 INJECTION, SOLUTION INTRAVENOUS ONCE
Status: COMPLETED | OUTPATIENT
Start: 2023-01-25 | End: 2023-01-25

## 2023-01-25 RX ORDER — OLANZAPINE 5 MG/1
5 TABLET ORAL ONCE
Status: COMPLETED | OUTPATIENT
Start: 2023-01-25 | End: 2023-01-25

## 2023-01-25 RX ORDER — PALONOSETRON 0.05 MG/ML
0.25 INJECTION, SOLUTION INTRAVENOUS ONCE
Status: COMPLETED | OUTPATIENT
Start: 2023-01-25 | End: 2023-01-25

## 2023-01-25 RX ORDER — HEPARIN SODIUM (PORCINE) LOCK FLUSH IV SOLN 100 UNIT/ML 100 UNIT/ML
500 SOLUTION INTRAVENOUS AS NEEDED
Status: DISCONTINUED | OUTPATIENT
Start: 2023-01-25 | End: 2023-01-26 | Stop reason: HOSPADM

## 2023-01-25 RX ORDER — DIPHENHYDRAMINE HYDROCHLORIDE 50 MG/ML
50 INJECTION INTRAMUSCULAR; INTRAVENOUS AS NEEDED
Status: CANCELLED | OUTPATIENT
Start: 2023-01-25

## 2023-01-25 RX ORDER — OLANZAPINE 5 MG/1
5 TABLET ORAL ONCE
Status: CANCELLED | OUTPATIENT
Start: 2023-01-25 | End: 2023-01-25

## 2023-01-25 RX ORDER — FAMOTIDINE 10 MG/ML
20 INJECTION, SOLUTION INTRAVENOUS AS NEEDED
Status: CANCELLED | OUTPATIENT
Start: 2023-01-25

## 2023-01-25 RX ORDER — HEPARIN SODIUM (PORCINE) LOCK FLUSH IV SOLN 100 UNIT/ML 100 UNIT/ML
500 SOLUTION INTRAVENOUS AS NEEDED
Status: CANCELLED | OUTPATIENT
Start: 2023-04-01

## 2023-01-25 RX ORDER — FAMOTIDINE 10 MG/ML
20 INJECTION, SOLUTION INTRAVENOUS ONCE
Status: CANCELLED | OUTPATIENT
Start: 2023-01-25

## 2023-01-25 RX ORDER — SODIUM CHLORIDE 9 MG/ML
250 INJECTION, SOLUTION INTRAVENOUS ONCE
Status: CANCELLED | OUTPATIENT
Start: 2023-01-25

## 2023-01-25 RX ORDER — SODIUM CHLORIDE 9 MG/ML
250 INJECTION, SOLUTION INTRAVENOUS ONCE
Status: COMPLETED | OUTPATIENT
Start: 2023-01-25 | End: 2023-01-25

## 2023-01-25 RX ORDER — PALONOSETRON 0.05 MG/ML
0.25 INJECTION, SOLUTION INTRAVENOUS ONCE
Status: CANCELLED | OUTPATIENT
Start: 2023-01-25

## 2023-01-25 RX ORDER — SODIUM CHLORIDE 0.9 % (FLUSH) 0.9 %
10 SYRINGE (ML) INJECTION AS NEEDED
Status: CANCELLED | OUTPATIENT
Start: 2023-04-01

## 2023-01-25 RX ADMIN — DIPHENHYDRAMINE HYDROCHLORIDE 50 MG: 50 INJECTION INTRAMUSCULAR; INTRAVENOUS at 11:17

## 2023-01-25 RX ADMIN — PACLITAXEL 245 MG: 6 INJECTION, SOLUTION INTRAVENOUS at 12:03

## 2023-01-25 RX ADMIN — FOSAPREPITANT 150 MG: 150 INJECTION, POWDER, LYOPHILIZED, FOR SOLUTION INTRAVENOUS at 10:39

## 2023-01-25 RX ADMIN — SODIUM CHLORIDE 250 ML: 9 INJECTION, SOLUTION INTRAVENOUS at 10:33

## 2023-01-25 RX ADMIN — CARBOPLATIN 380 MG: 10 INJECTION, SOLUTION INTRAVENOUS at 15:11

## 2023-01-25 RX ADMIN — OLANZAPINE 5 MG: 5 TABLET, FILM COATED ORAL at 10:34

## 2023-01-25 RX ADMIN — DEXAMETHASONE SODIUM PHOSPHATE 20 MG: 10 INJECTION, SOLUTION INTRAMUSCULAR; INTRAVENOUS at 10:40

## 2023-01-25 RX ADMIN — HEPARIN SODIUM (PORCINE) LOCK FLUSH IV SOLN 100 UNIT/ML 500 UNITS: 100 SOLUTION at 15:46

## 2023-01-25 RX ADMIN — FAMOTIDINE 20 MG: 10 INJECTION, SOLUTION INTRAVENOUS at 10:34

## 2023-01-25 RX ADMIN — PALONOSETRON 0.25 MG: 0.25 INJECTION, SOLUTION INTRAVENOUS at 10:34

## 2023-01-26 ENCOUNTER — TELEPHONE (OUTPATIENT)
Dept: GYNECOLOGIC ONCOLOGY | Facility: CLINIC | Age: 73
End: 2023-01-26
Payer: MEDICARE

## 2023-01-26 NOTE — TELEPHONE ENCOUNTER
Caller: Eugenia Tavares PRATIK    Relationship: Self    Best call back number: [phone]    What is the best time to reach you: ANYTIME    Who are you requesting to speak with (clinical staff, provider,  specific staff member): CLINICAL     What was the call regarding: EUGENIA HAD A TREATMENT YESTERDAY AND IS SUPPOSE TO BE STARTING OLANZapine (ZyPREXA) 5 MG tablet TONIGHT.    SHE STATES THAT LAST NIGHT SHE WOKE UP AND FELT LIKE SHE WAS DYING AND SHE CANT EXPLAIN IT.    SHE WAS WONDERING IF THE OLANZapine (ZyPREXA) 5 MG tablet  WAS GIVEN TO HER BEFORE HER TREATMENT YESTERDAY, AND WANTING TO KNOW WHY SHE IS TAKING THE MEDICATION       Do you require a callback: YES           
"Called patient to explain rationale of Zyprexa to reduce postinfusion nausea and vomiting and that this is unrelated to her reported feelings of \"dying\"as she had not received any of this yesterday.  She is unable to explain exactly what symptoms she was experiencing.  I wonder if this related to the high-dose of steroids that she received as part of her infusion.  I asked the patient to let us know if this sensation recurs and for her to identify if there are any factors associated with it.  She verbalized understanding and thanked me for my call.    Electronically signed by Sarah Dee MD, 01/26/23, 5:35 PM EST.    "
[id number] Comprehensive

## 2023-02-06 ENCOUNTER — TELEPHONE (OUTPATIENT)
Dept: GYNECOLOGIC ONCOLOGY | Facility: CLINIC | Age: 73
End: 2023-02-06
Payer: MEDICARE

## 2023-02-06 NOTE — TELEPHONE ENCOUNTER
Caller: FABIAN    Relationship to patient: SELF    Best call back number: [phone]    Patient is needing: TO R/S INFUSION TO THE FOLLOWING WEEK. THE PT REQUESTS A CALL BACK FROM THE NURSE TO VERIFY.    PT ALSO WANTS TO KNOW IF SHE IS APPROVED THROUGH INSURANCE TO TAKE AVASTIN BEFORE HER NEXT TREATMENT.

## 2023-02-06 NOTE — TELEPHONE ENCOUNTER
Spoke with patient and let her know that her insurance will cover the avastin and that it is okay for the patient to reschedule for the following week. Let patient know that scheduling will give her a call to reschedule

## 2023-02-22 ENCOUNTER — DOCUMENTATION (OUTPATIENT)
Dept: NUTRITION | Facility: HOSPITAL | Age: 73
End: 2023-02-22
Payer: MEDICARE

## 2023-02-22 ENCOUNTER — OFFICE VISIT (OUTPATIENT)
Dept: GYNECOLOGIC ONCOLOGY | Facility: CLINIC | Age: 73
End: 2023-02-22
Payer: MEDICARE

## 2023-02-22 ENCOUNTER — HOSPITAL ENCOUNTER (OUTPATIENT)
Dept: ONCOLOGY | Facility: HOSPITAL | Age: 73
Discharge: HOME | End: 2023-02-22
Payer: MEDICARE

## 2023-02-22 ENCOUNTER — EDUCATION (OUTPATIENT)
Dept: ONCOLOGY | Facility: HOSPITAL | Age: 73
End: 2023-02-22
Payer: MEDICARE

## 2023-02-22 VITALS — DIASTOLIC BLOOD PRESSURE: 67 MMHG | TEMPERATURE: 97.9 F | HEART RATE: 71 BPM | SYSTOLIC BLOOD PRESSURE: 128 MMHG

## 2023-02-22 DIAGNOSIS — C56.3 OVARIAN CANCER, BILATERAL: Primary | ICD-10-CM

## 2023-02-22 DIAGNOSIS — G62.0 CHEMOTHERAPY-INDUCED NEUROPATHY: ICD-10-CM

## 2023-02-22 DIAGNOSIS — T45.1X5A CHEMOTHERAPY-INDUCED NEUROPATHY: ICD-10-CM

## 2023-02-22 LAB
ALBUMIN SERPL-MCNC: 4.2 G/DL (ref 3.5–5.2)
ALBUMIN/GLOB SERPL: 1.4 G/DL
ALP SERPL-CCNC: 25 U/L (ref 39–117)
ALT SERPL W P-5'-P-CCNC: 17 U/L (ref 1–33)
ANION GAP SERPL CALCULATED.3IONS-SCNC: 11 MMOL/L (ref 5–15)
AST SERPL-CCNC: 22 U/L (ref 1–32)
BASOPHILS # BLD AUTO: 0.02 10*3/MM3 (ref 0–0.2)
BASOPHILS NFR BLD AUTO: 0.5 % (ref 0–1.5)
BILIRUB SERPL-MCNC: 0.4 MG/DL (ref 0–1.2)
BILIRUB UR QL STRIP: NEGATIVE
BUN SERPL-MCNC: 19 MG/DL (ref 8–23)
BUN/CREAT SERPL: 22.6 (ref 7–25)
CALCIUM SPEC-SCNC: 9.1 MG/DL (ref 8.6–10.5)
CANCER AG125 SERPL QL: 17.8 U/ML (ref 0–38.1)
CHLORIDE SERPL-SCNC: 105 MMOL/L (ref 98–107)
CLARITY UR: CLEAR
CO2 SERPL-SCNC: 27 MMOL/L (ref 22–29)
COLOR UR: YELLOW
CREAT SERPL-MCNC: 0.84 MG/DL (ref 0.57–1)
DEPRECATED RDW RBC AUTO: 53.7 FL (ref 37–54)
EGFRCR SERPLBLD CKD-EPI 2021: 73.9 ML/MIN/1.73
EOSINOPHIL # BLD AUTO: 0.08 10*3/MM3 (ref 0–0.4)
EOSINOPHIL NFR BLD AUTO: 1.8 % (ref 0.3–6.2)
ERYTHROCYTE [DISTWIDTH] IN BLOOD BY AUTOMATED COUNT: 15.5 % (ref 12.3–15.4)
GLOBULIN UR ELPH-MCNC: 2.9 GM/DL
GLUCOSE SERPL-MCNC: 98 MG/DL (ref 65–99)
GLUCOSE UR STRIP-MCNC: NEGATIVE MG/DL
HCT VFR BLD AUTO: 39.5 % (ref 34–46.6)
HGB BLD-MCNC: 13.2 G/DL (ref 12–15.9)
HGB UR QL STRIP.AUTO: ABNORMAL
IMM GRANULOCYTES # BLD AUTO: 0 10*3/MM3 (ref 0–0.05)
IMM GRANULOCYTES NFR BLD AUTO: 0 % (ref 0–0.5)
KETONES UR QL STRIP: NEGATIVE
LEUKOCYTE ESTERASE UR QL STRIP.AUTO: ABNORMAL
LYMPHOCYTES # BLD AUTO: 1.41 10*3/MM3 (ref 0.7–3.1)
LYMPHOCYTES NFR BLD AUTO: 31.9 % (ref 19.6–45.3)
MCH RBC QN AUTO: 31.2 PG (ref 26.6–33)
MCHC RBC AUTO-ENTMCNC: 33.4 G/DL (ref 31.5–35.7)
MCV RBC AUTO: 93.4 FL (ref 79–97)
MONOCYTES # BLD AUTO: 0.38 10*3/MM3 (ref 0.1–0.9)
MONOCYTES NFR BLD AUTO: 8.6 % (ref 5–12)
NEUTROPHILS NFR BLD AUTO: 2.53 10*3/MM3 (ref 1.7–7)
NEUTROPHILS NFR BLD AUTO: 57.2 % (ref 42.7–76)
NITRITE UR QL STRIP: NEGATIVE
PH UR STRIP.AUTO: 7.5 [PH] (ref 5–8)
PLATELET # BLD AUTO: 186 10*3/MM3 (ref 140–450)
PMV BLD AUTO: 10 FL (ref 6–12)
POTASSIUM SERPL-SCNC: 4.3 MMOL/L (ref 3.5–5.2)
PROT SERPL-MCNC: 7.1 G/DL (ref 6–8.5)
PROT UR QL STRIP: NEGATIVE
RBC # BLD AUTO: 4.23 10*6/MM3 (ref 3.77–5.28)
SODIUM SERPL-SCNC: 143 MMOL/L (ref 136–145)
SP GR UR STRIP: 1.01 (ref 1–1.03)
UROBILINOGEN UR QL STRIP: ABNORMAL
WBC NRBC COR # BLD: 4.42 10*3/MM3 (ref 3.4–10.8)

## 2023-02-22 PROCEDURE — 25010000002 FOSAPREPITANT PER 1 MG: Performed by: OBSTETRICS & GYNECOLOGY

## 2023-02-22 PROCEDURE — A9270 NON-COVERED ITEM OR SERVICE: HCPCS | Performed by: OBSTETRICS & GYNECOLOGY

## 2023-02-22 PROCEDURE — 86304 IMMUNOASSAY TUMOR CA 125: CPT | Performed by: OBSTETRICS & GYNECOLOGY

## 2023-02-22 PROCEDURE — 25010000002 DIPHENHYDRAMINE PER 50 MG: Performed by: OBSTETRICS & GYNECOLOGY

## 2023-02-22 PROCEDURE — 99214 OFFICE O/P EST MOD 30 MIN: CPT | Performed by: OBSTETRICS & GYNECOLOGY

## 2023-02-22 PROCEDURE — 96375 TX/PRO/DX INJ NEW DRUG ADDON: CPT

## 2023-02-22 PROCEDURE — 25010000002 PACLITAXEL PER 1 MG: Performed by: NURSE PRACTITIONER

## 2023-02-22 PROCEDURE — 96415 CHEMO IV INFUSION ADDL HR: CPT

## 2023-02-22 PROCEDURE — 25010000002 DEXAMETHASONE PER 1 MG: Performed by: OBSTETRICS & GYNECOLOGY

## 2023-02-22 PROCEDURE — 25010000002 HEPARIN LOCK FLUSH PER 10 UNITS: Performed by: OBSTETRICS & GYNECOLOGY

## 2023-02-22 PROCEDURE — 81003 URINALYSIS AUTO W/O SCOPE: CPT | Performed by: OBSTETRICS & GYNECOLOGY

## 2023-02-22 PROCEDURE — 80053 COMPREHEN METABOLIC PANEL: CPT | Performed by: OBSTETRICS & GYNECOLOGY

## 2023-02-22 PROCEDURE — 25010000002 PALONOSETRON 0.25 MG/5ML SOLUTION PREFILLED SYRINGE: Performed by: OBSTETRICS & GYNECOLOGY

## 2023-02-22 PROCEDURE — 96413 CHEMO IV INFUSION 1 HR: CPT

## 2023-02-22 PROCEDURE — 25010000002 CARBOPLATIN PER 50 MG: Performed by: NURSE PRACTITIONER

## 2023-02-22 PROCEDURE — 85025 COMPLETE CBC W/AUTO DIFF WBC: CPT | Performed by: OBSTETRICS & GYNECOLOGY

## 2023-02-22 PROCEDURE — 96417 CHEMO IV INFUS EACH ADDL SEQ: CPT

## 2023-02-22 PROCEDURE — 63710000001 OLANZAPINE 5 MG TABLET: Performed by: OBSTETRICS & GYNECOLOGY

## 2023-02-22 PROCEDURE — 25010000002 BEVACIZUMAB-BVZR 400 MG/16ML SOLUTION 16 ML VIAL: Performed by: NURSE PRACTITIONER

## 2023-02-22 PROCEDURE — 96367 TX/PROPH/DG ADDL SEQ IV INF: CPT

## 2023-02-22 RX ORDER — FAMOTIDINE 10 MG/ML
20 INJECTION, SOLUTION INTRAVENOUS ONCE
Status: COMPLETED | OUTPATIENT
Start: 2023-02-22 | End: 2023-02-22

## 2023-02-22 RX ORDER — DIPHENHYDRAMINE HYDROCHLORIDE 50 MG/ML
50 INJECTION INTRAMUSCULAR; INTRAVENOUS AS NEEDED
Status: CANCELLED | OUTPATIENT
Start: 2023-02-22

## 2023-02-22 RX ORDER — OLANZAPINE 5 MG/1
5 TABLET ORAL ONCE
Status: CANCELLED | OUTPATIENT
Start: 2023-02-22 | End: 2023-02-22

## 2023-02-22 RX ORDER — OLANZAPINE 5 MG/1
5 TABLET ORAL ONCE
Status: COMPLETED | OUTPATIENT
Start: 2023-02-22 | End: 2023-02-22

## 2023-02-22 RX ORDER — SODIUM CHLORIDE 0.9 % (FLUSH) 0.9 %
10 SYRINGE (ML) INJECTION AS NEEDED
Status: CANCELLED | OUTPATIENT
Start: 2023-04-01

## 2023-02-22 RX ORDER — FAMOTIDINE 10 MG/ML
20 INJECTION, SOLUTION INTRAVENOUS AS NEEDED
Status: CANCELLED | OUTPATIENT
Start: 2023-02-22

## 2023-02-22 RX ORDER — FAMOTIDINE 10 MG/ML
20 INJECTION, SOLUTION INTRAVENOUS ONCE
Status: CANCELLED | OUTPATIENT
Start: 2023-02-22

## 2023-02-22 RX ORDER — SODIUM CHLORIDE 9 MG/ML
250 INJECTION, SOLUTION INTRAVENOUS ONCE
Status: CANCELLED | OUTPATIENT
Start: 2023-02-22

## 2023-02-22 RX ORDER — HEPARIN SODIUM (PORCINE) LOCK FLUSH IV SOLN 100 UNIT/ML 100 UNIT/ML
500 SOLUTION INTRAVENOUS AS NEEDED
Status: CANCELLED | OUTPATIENT
Start: 2023-04-01

## 2023-02-22 RX ORDER — SODIUM CHLORIDE 9 MG/ML
250 INJECTION, SOLUTION INTRAVENOUS ONCE
Status: COMPLETED | OUTPATIENT
Start: 2023-02-22 | End: 2023-02-22

## 2023-02-22 RX ORDER — SODIUM CHLORIDE 0.9 % (FLUSH) 0.9 %
10 SYRINGE (ML) INJECTION AS NEEDED
Status: DISCONTINUED | OUTPATIENT
Start: 2023-02-22 | End: 2023-02-23 | Stop reason: HOSPADM

## 2023-02-22 RX ORDER — PALONOSETRON 0.05 MG/ML
0.25 INJECTION, SOLUTION INTRAVENOUS ONCE
Status: COMPLETED | OUTPATIENT
Start: 2023-02-22 | End: 2023-02-22

## 2023-02-22 RX ORDER — HEPARIN SODIUM (PORCINE) LOCK FLUSH IV SOLN 100 UNIT/ML 100 UNIT/ML
500 SOLUTION INTRAVENOUS AS NEEDED
Status: DISCONTINUED | OUTPATIENT
Start: 2023-02-22 | End: 2023-02-23 | Stop reason: HOSPADM

## 2023-02-22 RX ORDER — PALONOSETRON 0.05 MG/ML
0.25 INJECTION, SOLUTION INTRAVENOUS ONCE
Status: CANCELLED | OUTPATIENT
Start: 2023-02-22

## 2023-02-22 RX ADMIN — DEXAMETHASONE SODIUM PHOSPHATE 20 MG: 4 INJECTION, SOLUTION INTRAMUSCULAR; INTRAVENOUS at 10:35

## 2023-02-22 RX ADMIN — DIPHENHYDRAMINE HYDROCHLORIDE 50 MG: 50 INJECTION INTRAMUSCULAR; INTRAVENOUS at 10:35

## 2023-02-22 RX ADMIN — SODIUM CHLORIDE 740 MG: 9 INJECTION, SOLUTION INTRAVENOUS at 11:40

## 2023-02-22 RX ADMIN — FAMOTIDINE 20 MG: 10 INJECTION INTRAVENOUS at 10:35

## 2023-02-22 RX ADMIN — PACLITAXEL 250 MG: 6 INJECTION, SOLUTION INTRAVENOUS at 12:20

## 2023-02-22 RX ADMIN — HEPARIN 500 UNITS: 100 SYRINGE at 16:22

## 2023-02-22 RX ADMIN — CARBOPLATIN 380 MG: 10 INJECTION, SOLUTION INTRAVENOUS at 15:44

## 2023-02-22 RX ADMIN — SODIUM CHLORIDE 250 ML: 9 INJECTION, SOLUTION INTRAVENOUS at 10:30

## 2023-02-22 RX ADMIN — OLANZAPINE 5 MG: 5 TABLET, FILM COATED ORAL at 10:29

## 2023-02-22 RX ADMIN — PALONOSETRON 0.25 MG: 0.25 INJECTION, SOLUTION INTRAVENOUS at 10:31

## 2023-02-22 RX ADMIN — SODIUM CHLORIDE 150 MG: 9 INJECTION, SOLUTION INTRAVENOUS at 10:55

## 2023-02-22 RX ADMIN — Medication 10 ML: at 16:22

## 2023-02-22 NOTE — PROGRESS NOTES
Eugenia CHRISTIE [facility]  [id number]  1950      Reason for visit: History of ovarian cancer now with biopsy-proven lymph node recurrence in the chest, chemotherapy consideration and toxicity assessment.     History of present illness:  The patient is a 72 y.o. year old female who presents today for treatment and evaluation of the above issues.    She is 4 years out from completion of treatment. Last  26.9 on 1/4. Had CT scan on 6/22 which showed slowly enlarging bilateral pericardial fat pad lymph notes. 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      She presents today for consideration for cycle #4 of Carboplatin/Paclitaxel/Bevacizumab. She reports feeling great. Still walking 6 days a week. Neuropathy stable. Denies chest pain, shortness of air, changes in bowel/bladder function, appetite, issues with her port. She is nervous about starting bevacizumab today but is agreeable.     Oncologic History:  Oncology/Hematology History   Ovarian cancer, bilateral (HCC)   6/22/2018 Initial Diagnosis    Abnormal examination by GI for patient c/o bloating and diarrhea, sent to GYN. TVUS concerning for malignancy. CT showed multiple cystic masses on bilateral ovaries, possible omental implants, and large volume ascites. Paracentesis performed and cytology returned positive for metastatic adenocarcinoma, favor GYN origin. CA-125 at diagnosis = 907.7. Referred to Gyn Oncology.      6/28/2018 Procedure    Port-a-cath placement     7/6/2018 - 8/16/2018 Chemotherapy    OP OVARIAN PACLitaxel / CARBOplatin (Q21D)  Neoadjuvant x 3 cycles     9/6/2018 Imaging    Interval CT showed improvement in ascites and resolution of previous left pleural effusion     9/11/2018 Surgery    Exploratory laparotomy, EMMA/BSO, debulking to R=0, and partial rectal resection. 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 Primary tumor  site thought to be left fallopian tube. Bilateral ovarian, tubal, and surface involvement. Omentum, pelvic peritoneum, colon serosa, and rectal serosa involved. Macroscopic extrapelvic peritoneal implants also found. Stage IIIB grade 3         10/3/2018 - 11/29/2018 Chemotherapy    OP OVARIAN PACLitaxel / CARBOplatin AUC=6 / Bevacizumab 15 mg/kg  3 cycles adjuvant chemotherapy planned.     Patient chose to decline Avastin with all adjuvant cycles.     10/19/2018 Genetic Testing    Counseling and testing completed via CancerNext panel. 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 Patient does not have disease, but is a carrier.     1/3/2019 Imaging    Post-treatment CT chest, abdomen, and pelvis. No ascites, nodularity, or residual disease seen. No evidence of progression or active malignancy.      3/21/2019 Imaging    CT chest, abdomen, pelvis for diffuse abdominal pain. Study negative for recurrent disease     12/29/2020 Imaging    CT chest, abdomen, pelvis prior to port removal:  Stable examination with no CT evidence of acute intrathoracic, intra-abdominal or pelvic abnormality. No evidence of progression or metastatic disease.     9/10/2021 Imaging    CT chest, abdomen, pelvis:  Stable appearance of the chest abdomen and pelvis without acute pathology or evidence for occurrence or active metastatic disease     6/22/2022 Imaging    CT chest, abdomen, pelvis:  No evidence of metastatic disease within the abdomen or pelvis. Slowly enlarging bilateral pericardial fat pad lymph notes. Findings  are nonspecific given the slow progression since 2020, but are  concerning for metastatic disease or lymphoma.     8/22/2022 Progression    CT biopsy left pericardial lymph node. 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     9/20/2022 Molecular Testing    CARIS results:    PD-L1 positive  ER positive 2+,  75%  MSI stable, MMR proficient, MEGAN low, TMB low  PAMELA, BRCA 1/2, RAD51 C/D all negative  MT negative  Her2/Kike negative     12/13/2022 - 1/25/2023 Chemotherapy    OP OVARIAN PACLitaxel / CARBOplatin (Q21D)     2/22/2023 -  Chemotherapy    OP OVARIAN PACLitaxel / CARBOplatin AUC=6 / Bevacizumab 15 mg/kg     4/26/2023 -  Chemotherapy    OP OVARIAN Bevacizumab 15 mg/kg (Maintenance)           Past Medical History:   Diagnosis Date   • Hypertension    • Ovarian cancer (HCC) 2018    chemotherapy    • Wears glasses        Past Surgical History:   Procedure Laterality Date   • APPENDECTOMY      possibly with tubal (not certain)   • BREAST BIOPSY Bilateral 1972    cysts removed    • COLONOSCOPY     • EXPLORATORY LAPAROTOMY, TOTAL ABDOMINAL HYSTERECTOMY SALPINGO OOPHORECTOMY N/A 09/11/2018    Procedure: LAPAROTOMY EXPLORATORY, TOTAL ABDOMINAL HYSTERECTOMY, BILATERAL SALPINGO OOPHORECTOMY, DEBULKING;  Surgeon: Shannan Bess MD;  Location: [facility];  Service: Gynecology   • HERNIA REPAIR Right     inguinal    • SUBLINGUAL SALIVARY CYST EXCISION Right 2020    at [facility]   • TUBAL ABDOMINAL LIGATION         MEDICATIONS: The current medication list was reviewed with the patient and updated in the EMR this date per the Medical Assistant. Medication dosages and frequencies were confirmed to be accurate.      Allergies:  has No Known Allergies.    Social History:   Social History     Socioeconomic History   • Marital status:    Tobacco Use   • Smoking status: Never   • Smokeless tobacco: Never   Vaping Use   • Vaping Use: Never used   Substance and Sexual Activity   • Alcohol use: No   • Drug use: No   • Sexual activity: Defer       Family History:    Family History   Adopted: Yes   Family history unknown: Yes       Health Maintenance:    Health Maintenance   Topic Date Due   • Pneumococcal Vaccine 65+ (1 - PCV) Never done   • TDAP/TD VACCINES (1 - Tdap) Never done   • ZOSTER VACCINE (1 of 2) Never done   • HEPATITIS C  SCREENING  Never done   • ANNUAL WELLNESS VISIT  Never done   • LIPID PANEL  11/03/2021   • COVID-19 Vaccine (4 - Booster for Pfizer series) 11/22/2021   • INFLUENZA VACCINE  08/01/2022   • DXA SCAN  03/22/2023   • MAMMOGRAM  01/27/2024   • COLORECTAL CANCER SCREENING  04/24/2028       Review of Systems  Please refer to history of present illness, review of systems otherwise negative.      There were no vitals filed for this visit.    There is no height or weight on file to calculate BMI.  Wt Readings from Last 3 Encounters:   01/25/23 49.4 kg (108 lb 12.8 oz)   01/04/23 49.1 kg (108 lb 3.2 oz)   12/13/22 49.4 kg (109 lb)       GENERAL: Alert, well-appearing female appearing her stated age who is in no apparent distress.   HEENT: Sclera anicteric. Head normocephalic, atraumatic.  Wearing mask.  NECK: Deferred  BREASTS: Deferred  CARDIOVASCULAR: Normal rate, regular rhythm.  No murmurs, rubs, gallops.  No peripheral edema.  RESPIRATORY: Lungs clear to auscultation bilaterally, normal respiratory effort on room air  BACK: Deferred  GASTROINTESTINAL: Deferred, no distention  SKIN:  Warm, dry, well-perfused.  All visible areas intact.  No rashes, lesions, ulcers. Port in left side of chest accessed without issue  PSYCHIATRIC: AO x3, with appropriate affect, normal thought processes.  Mood and affect appropriate.  NEUROLOGIC: No focal deficits.  Moves extremities well.  MUSCULOSKELETAL: Normal gait and station.   EXTREMITIES:   No cyanosis, clubbing, symmetric.  LYMPHATICS: Deferred     PELVIC exam:    Deferred    ECOG PS 0    PROCEDURES: None    Diagnostic Data:     No radiology results for the last 30 days.        Lab Results   Component Value Date    WBC 4.42 02/22/2023    HGB 13.2 02/22/2023    HCT 39.5 02/22/2023    MCV 93.4 02/22/2023     02/22/2023    NEUTROABS 2.53 02/22/2023    GLUCOSE 98 02/22/2023    BUN 19 02/22/2023    CREATININE 0.84 02/22/2023    EGFRIFNONA 70 11/29/2018     02/22/2023    K  4.3 02/22/2023     02/22/2023    CO2 27.0 02/22/2023    MG 2.1 06/20/2018    PHOS 4.6 06/20/2018    CALCIUM 9.1 02/22/2023    ALBUMIN 4.2 02/22/2023    AST 22 02/22/2023    ALT 17 02/22/2023    BILITOT 0.4 02/22/2023     Lab Results   Component Value Date     17.8 02/22/2023     21.8 01/25/2023     26.9 01/04/2023     46.9 (H) 12/12/2022     42.4 (H) 11/02/2022     40.5 (H) 09/28/2022     33.2 05/17/2022     25.7 11/17/2021     30.5 08/23/2021     24.7 05/18/2021         Assessment & Plan   This is a 72 y.o. 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    Encounter Diagnoses   Name Primary?   • Ovarian cancer, bilateral (HCC) Yes   • Chemotherapy-induced neuropathy (HCC)       Recurrent Ovarian Cancer  -initial diagnosis in 2018  -first line treatment included 3 cycles neoadjuvant carbo/taxol, interval optimal debulking, and 3 cycles adjuvant carbo/taxol  -recurrence found in 6/2022 in thoracic lymph nodes, biopsy proven in 8/2022  -platinum-sensitive, so initiated second line carbo/taxol on 12/13/2022, bevacizumab added on 2/22/2023  -tolerating treatment well overall thus far  -labs reviewed. OK to proceed with cycle #4 today. No dose reduction at this time.     Chemotherapy-induced neuropathy  -mild at baseline since chemotherapy in 2018  -stable     Orders Placed This Encounter   Procedures   • Comprehensive metabolic panel     Standing Status:   Future     Number of Occurrences:   1     Standing Expiration Date:   2/22/2024     Order Specific Question:   Release to patient     Answer:   Routine Release   • Urinalysis without microscopic (no culture) - Urine, Clean Catch     Standing Status:   Future     Number of Occurrences:   1     Standing Expiration Date:   2/22/2024     Order Specific Question:   Release to patient     Answer:   Routine Release   •      Standing Status:   Future     Number of Occurrences:   1      Standing Expiration Date:   2/22/2024     Order Specific Question:   Release to patient     Answer:   Routine Release   • CBC and Differential     Standing Status:   Future     Number of Occurrences:   1     Standing Expiration Date:   2/22/2024     Order Specific Question:   Manual Differential     Answer:   No     Order Specific Question:   Release to patient     Answer:   Routine Release     FOLLOW UP: 3 weeks    Sarah Dee MD  02/22/23  17:28 EST

## 2023-02-22 NOTE — PROGRESS NOTES
Onc Nutrition    Patient: Eugenia Tavares  YOB: 1950    Diagnosis: recurrent ovarian cancer metastasized to thoracic lymph nodes  Neoadjuvant Chemo: PACLitaxel / CARBOplatin - 3/3 cycles completed 8/16/18  Surgery: Exp lap, EMMA/BSO, debulking to R=0, and partial rectal resection (9/11/18)  Adjuvant Chemo: PACLitaxel / CARBOplatin  - 3/3 cycles completed 11/29/18  Current Chemo: PACLitaxel / CARBOplatin / bevacizumab(added with cycle 4) - every 21 days     Weight - 108# (1/25/23) / stable     Follow up with patient during her infusion appointment.  Note bevacizumab is being added to her chemo regimen today.  Patient reports she continues to tolerate chemo well overall.  She also reports she continues to manage her chronic constipation with Miralax.  She states she strives to eat a healthy well balanced diet and tries to minimize her sugar intake.    Reviewed the importance of good nutrition during her treatment course.  Encouraged her to be eating smaller more frequent snacks throughout the day to aid with potential nausea management.  Discussed the importance of protein and advised her to include high protein foods at each meal / snack.  Reviewed the importance of hydration and encouraged her to increase her intake of fluids.  Also reviewed the importance of eating a healthy well balanced diet that includes a wide variety of fruits, vegetables, whole grains, nuts, legumes, lean meats, low fat dairy, and to limit / avoid foods made with refined white flour and high in added sugar.     Answered her questions and she voiced understanding of information discussed.  Encouraged to call RD with questions.  Will follow up as indicated.    Latanya Clarke RD  02/25/23

## 2023-02-22 NOTE — PLAN OF CARE
Outpatient Infusion • [address] [facility] • [address] • [place] [id number] • [phone]      CHEMOTHERAPY EDUCATION    NAME:  Eugenia Tavares      : 1950           DATE: 23    Medication Education Sheets: (select all that apply)  Bevacizumab    Other Education Sheets: (select all that apply)  Blood Pressure Log and Symptom Tracker Sheet and MERARI Information    Chemotherapy Regimen:   OP OVARIAN PACLitaxel / CARBOplatin AUC=6 / Bevacizumab 15 mg/kg  every 21 days    Patient has been receiving carboplatin and paclitaxel.  She is adding bevacizumab into this regimen. Education was focused on bevacizumab information.    TOPICS EDUCATION PROVIDED COMMENTS   ANEMIA:  role of RBC, cause, s/s, ways to manage, role of transfusion [x] Reviewed the role of RBC and the use of transfusions if hemoglobin decreases too much.  Patient to notify us if they experience shortness of breath, dizziness, or palpitations.  Also let patient know they could feel more tired than usual and to try to stay active, but rest if they need to.    THROMBOCYTOPENIA:  role of platelet, cause, s/s, ways to prevent bleeding, things to avoid, when to seek help [x] Reviewed the role of platelets in blood clotting and when to call clinic (bloody nose that bleeds for 5 mins despite pressure, a cut that won't stop bleeding despite pressure, gums that bleed excessively with brushing or flossing). Recommended using an electric razor, soft bristle toothbrush, and blowing your nose gently.    NEUTROPENIA:  role of WBC, cause, infection precautions, s/s of infection, when to call MD [x] Reviewed the role of WBC, good infection prevention practices, and when to call the clinic (temperature 100.4F, sore throat, burning urination, etc)     NUTRITION & APPETITE CHANGES:  importance of maintaining healthy diet & weight, ways to manage to improve intake, dietary consult, exercise regimen, electrolyte and/or blood glucose abnormalities [x] Discussed  risk of decreased appetite. Recommended eating smaller, more frequent meals. Instructed the patient to contact clinic if they were losing weight or having difficulty eating enough to maintain their energy level.   DIARRHEA:  causes, s/s of dehydration, ways to manage, dietary changes, when to call MD [x] Chemotherapy - Discussed risk of diarrhea. Instructed patient that they can use OTC loperamide at first presentation of diarrhea, but call MD if 4-6 episodes in 24 hours not relieved by OTC loperamide.   CONSTIPATION:  causes, ways to manage, dietary changes, when to call MD [x] Provided supplementary handout with instructions for use of docusate and other OTC therapies to manage constipation.  Instructed to call us if medications aren't working.    NAUSEA & VOMITING:  cause, use of antiemetics, dietary changes, when to call MD [x] Discussed bevacizumab has a minimal emetic risk.    Instructed the patient to take a dose of the PRN medication at the first onset of nausea and if it's not working to call us for additional medications.  Also provided non-drug measures to mitigate nausea.   MOUTH SORES:  causes, oral care, ways to manage []    ALOPECIA:  cause, ways to manage, resources []    NERVOUS SYSTEM CHANGES:  causes, s/s, neuropathies, cognitive changes, ways to manage []    PAIN:  causes, ways to manage [x] Chemo - Discussed muscle and joint aches/pains with chemotherapy, and recommended the use of OTC pain relief with ibuprofen or acetaminophen if needed.   SKIN & NAIL CHANGES:  cause, s/s, ways to manage []    ORGAN TOXICITIES:  cause, s/s, need for diagnostic tests, labs, when to notify MD [x] Discussed potential effects on organ systems, monitoring, diagnostic tests, labs, and when to notify their MD. Discussed the signs/symptoms of the following: cardiotoxicity, central neurotoxicity (confusion, vision changes, stiff neck, seizure), delayed wound healing, GI perforation, hepatotoxicity, hypertension -  recommended close monitoring of blood pressure, provided BP log, and explained how to track values, lung changes and nephrotoxicity   INFUSION RELATED REACTIONS or INJECTION-SITE REACTION:  Cause, s/s, anaphylaxis, monitoring, etc. [x] Discussed the risk of an infusion reaction and symptoms such as: fever, chills, dizziness, itchiness or rash, flushing, trouble breathing, wheezing, sudden back pain, or feeling faint.  Instructed the patient to notify their nurse if they start feeling weird at any point during their infusion.    Reviewed how infusion reactions are managed.   MISCELLANEOUS:  drug interactions, administration, labs, etc. [x] Discussed chemotherapy schedule, lab draws, infusion times, and total expected visit time.   • DDIs: No significant DDIs  • Lab draws: On or before day 1 of each cycle, no sooner than 3 days early.  • Miscellaneous: Blood Clots: Explained the rare, but possible risk of VTE or PE.  Reviewed the signs and symptoms and stressed the urgency to call 911 immediately.   INFERTILITY & SEXUALITY:    causes, fertility preservation options, sexuality changes, ways to manage, importance of birth control [x] IV Oncology Therapy: Reviewed safe sex practices and minimizing exposure to body fluids for 48 hours after each dose of IV oncology therapy. and The patient is not of childbearing potential.       HOME CARE:  storing of oral chemo, how to manage bodily fluids [x] IV - Counseled on management of soiled linens and proper flush technique.  Discussed how to manage all the side effects at home and advised when to contact the MD office   SURVIVORSHIP:  distress, distress assessment, secondary malignancies, early/late effects, follow-up, social issues, social support []      Medications:  Prior to Admission medications    Medication Sig Start Date End Date Taking? Authorizing Provider   Calcium Carb-Cholecalciferol 1000-800 MG-UNIT tablet calcium    Provider, MD Jaspal   calcium carbonate  "(OS-OWEN) 600 MG tablet Take 1,200 mg by mouth Daily. Patient takes 2 tablets daily    Jaspal Stein MD   Cholecalciferol (VITAMIN D3) 32589 units tablet Take 1 tablet by mouth Daily.    Jaspal Stein MD   Cyanocobalamin (Vitamin B 12) 100 MCG lozenge Take 1,000 mcg by mouth Daily. Is taking \"vitamin b 12 complex\"    Jaspal Stein MD   doxylamine (UNISOM) 25 MG tablet Take 25 mg by mouth At Night As Needed for Sleep. TaKE 1/2 NIGHTLY    Jaspal Stein MD   guaiFENesin-codeine (GUAIFENESIN AC) 100-10 MG/5ML liquid Take 5 mL by mouth 3 (Three) Times a Day As Needed for Cough. 1/4/23   Tran Odom APRN   lidocaine-prilocaine (EMLA) 2.5-2.5 % cream  12/13/22   Jaspal Stein MD   OLANZapine (ZyPREXA) 5 MG tablet Take 1 tablet by mouth Every Night. Take on days 2, 3 and 4 after chemotherapy. 12/5/22   Shannan Bess MD   ondansetron (ZOFRAN) 8 MG tablet Take 1 tablet by mouth 3 (Three) Times a Day As Needed for Nausea or Vomiting. 12/5/22   Shannan Bess MD   Polyethylene Glycol 3350 (MIRALAX PO) Take  by mouth Daily.    Jaspal Stein MD   vitamin C (ASCORBIC ACID) 500 MG tablet Take 500 mg by mouth Daily.    Jaspal Stein MD   vitamin E 400 UNIT capsule Take 1,000 Units by mouth Daily.    Jaspal Stein MD       Notes: All questions and concerns were addressed. Provided a personalized treatment calendar to patient (includes treatment and lab schedule). Provided patient with contact information for the pharmacist and clinic while instructing them to call if any questions or concerns arise. Informed consent for treatment was obtained. Patient was receptive to information and expressed understanding.     Octavia Hernandes, SebleD, [facility]  Clinical Oncology Pharmacy Specialist  Phone: [phone]    2/22/2023  11:00 EST  "

## 2023-03-13 ENCOUNTER — TELEPHONE (OUTPATIENT)
Dept: GYNECOLOGIC ONCOLOGY | Facility: CLINIC | Age: 73
End: 2023-03-13
Payer: MEDICARE

## 2023-03-13 DIAGNOSIS — I15.8 OTHER SECONDARY HYPERTENSION: Primary | ICD-10-CM

## 2023-03-13 RX ORDER — HYDROCHLOROTHIAZIDE 25 MG/1
25 TABLET ORAL DAILY
Qty: 30 TABLET | Refills: 0 | Status: CANCELLED | OUTPATIENT
Start: 2023-03-13 | End: 2023-04-12

## 2023-03-13 RX ORDER — HYDROCHLOROTHIAZIDE 25 MG/1
25 TABLET ORAL DAILY
Status: CANCELLED | OUTPATIENT
Start: 2023-03-13 | End: 2023-04-12

## 2023-03-13 NOTE — TELEPHONE ENCOUNTER
"RN called patient and reviewed concerns. Pt reports being \"pre-hypertensive\" prior to the Tiffani treatment. Pt now has higher Bps with diastolic reported to be around 95. Pt requested MD order some medications for BP. RN will confer with provider. RN told pt that she would call her back with info.  "

## 2023-03-13 NOTE — TELEPHONE ENCOUNTER
Caller: Eugenia Tavares    Relationship: Self    Best call back number: [phone]    What is the best time to reach you: ANY    Who are you requesting to speak with (clinical staff, provider,  specific staff member): CLINICAL    What was the call regarding: EUGENIA  IS CALLING STATES THAT WHEN THE MEDICATION ON HER LAST TREATMENT WAS ADDED, HER BLOOD PRESSURE HAS WENT UP     EUGENIA IS WANTING TO DISCUSS THE READINGS    Do you require a callback: YES

## 2023-03-14 NOTE — PROGRESS NOTES
Eugenia CHRISTIE [facility]  [id number]  1950    Reason for visit: History of ovarian cancer now with biopsy-proven lymph node recurrence in the chest, chemotherapy consideration and toxicity assessment.     History of present illness:  The patient is a 72 y.o. year old female who presents today for treatment and evaluation of the above issues.    She is 4 years out from completion of treatment. Last  26.9 on 1/4. Had CT scan on 06/2022 which showed slowly enlarging bilateral pericardial fat pad lymph notes. 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      She presents today for consideration for cycle #5 of Carboplatin/Paclitaxel/Bevacizumab. She reports feeling well overall. Still walking 6 days a week. Neuropathy stable. Denies chest pain, shortness of air, changes in bowel/bladder function, appetite, issues with her port. She has been feeling well since starting bevacizumab in Feb. 2023 and only asks about her BP control today. She has been logging all of her BP, and states that she often takes multiple BP measurements and notes the best one. In the last one week, per the log, her BP have ranged from 146-170 SBP. She did not receive the previously prescribed HCTZ.    Oncologic History:  Oncology/Hematology History   Ovarian cancer, bilateral (HCC)   6/22/2018 Initial Diagnosis    Abnormal examination by GI for patient c/o bloating and diarrhea, sent to GYN. TVUS concerning for malignancy. CT showed multiple cystic masses on bilateral ovaries, possible omental implants, and large volume ascites. Paracentesis performed and cytology returned positive for metastatic adenocarcinoma, favor GYN origin. CA-125 at diagnosis = 907.7. Referred to Gyn Oncology.      6/28/2018 Procedure    Port-a-cath placement     7/6/2018 - 8/16/2018 Chemotherapy    OP OVARIAN PACLitaxel / CARBOplatin (Q21D)  Neoadjuvant x 3 cycles      9/6/2018 Imaging    Interval CT showed improvement in ascites and resolution of previous left pleural effusion     9/11/2018 Surgery    Exploratory laparotomy, EMMA/BSO, debulking to R=0, and partial rectal resection. 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 Primary tumor site thought to be left fallopian tube. Bilateral ovarian, tubal, and surface involvement. Omentum, pelvic peritoneum, colon serosa, and rectal serosa involved. Macroscopic extrapelvic peritoneal implants also found. Stage IIIB grade 3         10/3/2018 - 11/29/2018 Chemotherapy    OP OVARIAN PACLitaxel / CARBOplatin AUC=6 / Bevacizumab 15 mg/kg  3 cycles adjuvant chemotherapy planned.     Patient chose to decline Avastin with all adjuvant cycles.     10/19/2018 Genetic Testing    Counseling and testing completed via CancerNext panel. 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 Patient does not have disease, but is a carrier.     1/3/2019 Imaging    Post-treatment CT chest, abdomen, and pelvis. No ascites, nodularity, or residual disease seen. No evidence of progression or active malignancy.      3/21/2019 Imaging    CT chest, abdomen, pelvis for diffuse abdominal pain. Study negative for recurrent disease     12/29/2020 Imaging    CT chest, abdomen, pelvis prior to port removal:  Stable examination with no CT evidence of acute intrathoracic, intra-abdominal or pelvic abnormality. No evidence of progression or metastatic disease.     9/10/2021 Imaging    CT chest, abdomen, pelvis:  Stable appearance of the chest abdomen and pelvis without acute pathology or evidence for occurrence or active metastatic disease     6/22/2022 Imaging    CT chest, abdomen, pelvis:  No evidence of metastatic disease within the abdomen or pelvis. Slowly enlarging bilateral pericardial fat pad lymph notes. Findings  are nonspecific given the slow  progression since 2020, but are  concerning for metastatic disease or lymphoma.     8/22/2022 Progression    CT biopsy left pericardial lymph node. 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     9/20/2022 Molecular Testing    CARIS results:    PD-L1 positive  ER positive 2+, 75%  MSI stable, MMR proficient, MEGAN low, TMB low  PAMELA, BRCA 1/2, RAD51 C/D all negative  MT negative  Her2/Kike negative     12/13/2022 - 1/25/2023 Chemotherapy    OP OVARIAN PACLitaxel / CARBOplatin (Q21D)     2/22/2023 -  Chemotherapy    OP OVARIAN PACLitaxel / CARBOplatin AUC=6 / Bevacizumab 15 mg/kg     4/26/2023 -  Chemotherapy    OP OVARIAN Bevacizumab 15 mg/kg (Maintenance)           Past Medical History:   Diagnosis Date   • Hypertension    • Ovarian cancer (HCC) 2018    chemotherapy    • Wears glasses        Past Surgical History:   Procedure Laterality Date   • APPENDECTOMY      possibly with tubal (not certain)   • BREAST BIOPSY Bilateral 1972    cysts removed    • COLONOSCOPY     • EXPLORATORY LAPAROTOMY, TOTAL ABDOMINAL HYSTERECTOMY SALPINGO OOPHORECTOMY N/A 09/11/2018    Procedure: LAPAROTOMY EXPLORATORY, TOTAL ABDOMINAL HYSTERECTOMY, BILATERAL SALPINGO OOPHORECTOMY, DEBULKING;  Surgeon: Shannan Bess MD;  Location: [facility];  Service: Gynecology   • HERNIA REPAIR Right     inguinal    • SUBLINGUAL SALIVARY CYST EXCISION Right 2020    at [facility]   • TUBAL ABDOMINAL LIGATION         MEDICATIONS: The current medication list was reviewed with the patient and updated in the EMR this date per the Medical Assistant. Medication dosages and frequencies were confirmed to be accurate.      Allergies:  has No Known Allergies.    Social History:   Social History     Socioeconomic History   • Marital status:    Tobacco Use   • Smoking status: Never   • Smokeless tobacco: Never   Vaping Use   • Vaping Use: Never used   Substance and Sexual Activity   • Alcohol use: No   • Drug use: No   • Sexual activity: Defer  "      Family History:    Family History   Adopted: Yes   Family history unknown: Yes       Health Maintenance:    Health Maintenance   Topic Date Due   • Pneumococcal Vaccine 65+ (1 - PCV) Never done   • TDAP/TD VACCINES (1 - Tdap) Never done   • ZOSTER VACCINE (1 of 2) Never done   • HEPATITIS C SCREENING  Never done   • ANNUAL WELLNESS VISIT  Never done   • LIPID PANEL  11/03/2021   • COVID-19 Vaccine (4 - Booster for Pfizer series) 11/22/2021   • INFLUENZA VACCINE  08/01/2022   • DXA SCAN  03/22/2023   • MAMMOGRAM  01/27/2024   • COLORECTAL CANCER SCREENING  04/24/2028       Review of Systems  Please refer to history of present illness, review of systems otherwise negative.    Vitals:    03/15/23 0904   BP: (!) 191/91   Pulse: 62   Resp: 17   Temp: 97.3 °F (36.3 °C)   TempSrc: Temporal   SpO2: 100%   Weight: 51.2 kg (112 lb 12.8 oz)   Height: 149.9 cm (59.02\")   PainSc: 0-No pain       Body mass index is 22.77 kg/m².  Wt Readings from Last 3 Encounters:   03/15/23 51.2 kg (112 lb 12.8 oz)   01/25/23 49.4 kg (108 lb 12.8 oz)   01/04/23 49.1 kg (108 lb 3.2 oz)       GENERAL: Alert, well-appearing female appearing her stated age who is in no apparent distress.   HEENT: Sclera anicteric. Head normocephalic, atraumatic.  Wearing mask.  NECK: Deferred  BREASTS: Deferred  CARDIOVASCULAR: Normal rate, regular rhythm.  No murmurs, rubs, gallops.  No peripheral edema.  RESPIRATORY: Lungs clear to auscultation bilaterally, normal respiratory effort on room air  BACK: Deferred  GASTROINTESTINAL: Deferred, no distention  SKIN:  Warm, dry, well-perfused.  All visible areas intact. Port in left side of chest accessed without issue  PSYCHIATRIC: AO x3, with appropriate affect, normal thought processes.  Mood and affect appropriate.  NEUROLOGIC: No focal deficits.  Moves extremities well.  MUSCULOSKELETAL: Normal gait and station.   EXTREMITIES:   No cyanosis, clubbing, symmetric.  LYMPHATICS: Deferred     PELVIC exam:  "   Deferred    ECOG PS 0    PROCEDURES: None    Diagnostic Data:     No radiology results for the last 30 days.    Lab Results   Component Value Date    WBC 4.69 03/15/2023    HGB 12.2 03/15/2023    HCT 37.3 03/15/2023    MCV 95.9 03/15/2023     03/15/2023    NEUTROABS 2.35 03/15/2023    GLUCOSE 89 03/15/2023    BUN 13 03/15/2023    CREATININE 0.76 03/15/2023    EGFRIFNONA 70 11/29/2018     03/15/2023    K 4.2 03/15/2023     (H) 03/15/2023    CO2 26.0 03/15/2023    MG 2.1 06/20/2018    PHOS 4.6 06/20/2018    CALCIUM 9.2 03/15/2023    ALBUMIN 3.8 03/15/2023    AST 49 (H) 03/15/2023    ALT 89 (H) 03/15/2023    BILITOT 0.2 03/15/2023     Lab Results   Component Value Date     17.8 02/22/2023     21.8 01/25/2023     26.9 01/04/2023     46.9 (H) 12/12/2022     42.4 (H) 11/02/2022     40.5 (H) 09/28/2022     33.2 05/17/2022     25.7 11/17/2021     30.5 08/23/2021     24.7 05/18/2021         Assessment & Plan   This is a 72 y.o. 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    Encounter Diagnosis   Name Primary?   • Ovarian cancer, bilateral (HCC) Yes      Recurrent Ovarian Cancer  -initial diagnosis in 2018  -first line treatment included 3 cycles neoadjuvant carbo/taxol, interval optimal debulking, and 3 cycles adjuvant carbo/taxol  -recurrence found in 6/2022 in thoracic lymph nodes, biopsy proven in 8/2022  -platinum-sensitive, so initiated second line carbo/taxol on 12/13/2022, bevacizumab added on 2/22/2023  -tolerating treatment well overall thus far  -labs reviewed. OK to proceed with cycle #5 today. No dose reduction at this time.   Will continue bevacizumab as a maintenance drug after completion of cytotoxic chemotherapy.    Chemotherapy-induced neuropathy  -mild at baseline since chemotherapy in 2018  -stable     Hypertension  - started bevacizumab in Feb. 2023  - per home log, her BP have ranged from 146-170 SBP in  last one week  - Prescribed HCTZ today and emphasized close follow up with PCP, Vasotec added to treatment plan    Proteinuria  -Not really worried about this at this point.  We will continue to follow.    Routine Health Maintenance Screening  - Counseled that mammogram and dental cleanings can be performed without additional precautions  - Patient due for colonoscopy. Counseled that although this is a minor procedure, recommend waiting until completion of carbo/taxol/bevacizumab cycle #6, and complete colonoscopy a few days prior to beginning next bevacizumab.  Typically, bevacizumab was held for weeks before and after major surgical procedures.  I usually hold bevacizumab for 3 weeks prior to and 2 weeks after Port-A-Cath placement because its vascular procedure.     - Patient sees Dr. Salazar. Instructed to check with Dr. Salazar about if bevacizumab needs to be held for specified amount of time before and after colonoscopy.     No orders of the defined types were placed in this encounter.    FOLLOW UP: 3 weeks    I spent 37 minutes caring for Eugneia on this date of service. This time includes time spent by me in the following activities: preparing for the visit, reviewing tests, performing a medically appropriate examination and/or evaluation, counseling and educating the patient/family/caregiver, ordering medications, tests, or procedures, referring and communicating with other health care professionals and documenting information in the medical record    Patient was seen and examined with Dr. Marina,  resident, who performed portions of the examination and documentation for this patient's care under my direct supervision.  I agree with the above documentation and plan.    Shannan Bess MD  03/15/23  11:15 EDT

## 2023-03-15 ENCOUNTER — OFFICE VISIT (OUTPATIENT)
Dept: GYNECOLOGIC ONCOLOGY | Facility: CLINIC | Age: 73
End: 2023-03-15
Payer: MEDICARE

## 2023-03-15 ENCOUNTER — HOSPITAL ENCOUNTER (OUTPATIENT)
Dept: ONCOLOGY | Facility: HOSPITAL | Age: 73
Discharge: HOME | End: 2023-03-15
Admitting: OBSTETRICS & GYNECOLOGY
Payer: MEDICARE

## 2023-03-15 VITALS
RESPIRATION RATE: 17 BRPM | DIASTOLIC BLOOD PRESSURE: 91 MMHG | TEMPERATURE: 97.3 F | OXYGEN SATURATION: 100 % | SYSTOLIC BLOOD PRESSURE: 191 MMHG | BODY MASS INDEX: 22.74 KG/M2 | HEIGHT: 59 IN | WEIGHT: 112.8 LBS | HEART RATE: 62 BPM

## 2023-03-15 VITALS — HEART RATE: 62 BPM | DIASTOLIC BLOOD PRESSURE: 74 MMHG | SYSTOLIC BLOOD PRESSURE: 153 MMHG

## 2023-03-15 DIAGNOSIS — I15.8 OTHER SECONDARY HYPERTENSION: Primary | ICD-10-CM

## 2023-03-15 DIAGNOSIS — C56.3 OVARIAN CANCER, BILATERAL: Primary | ICD-10-CM

## 2023-03-15 LAB
ALBUMIN SERPL-MCNC: 3.8 G/DL (ref 3.5–5.2)
ALBUMIN/GLOB SERPL: 1.5 G/DL
ALP SERPL-CCNC: 35 U/L (ref 39–117)
ALT SERPL W P-5'-P-CCNC: 89 U/L (ref 1–33)
ANION GAP SERPL CALCULATED.3IONS-SCNC: 9 MMOL/L (ref 5–15)
AST SERPL-CCNC: 49 U/L (ref 1–32)
BASOPHILS # BLD AUTO: 0.03 10*3/MM3 (ref 0–0.2)
BASOPHILS NFR BLD AUTO: 0.6 % (ref 0–1.5)
BILIRUB SERPL-MCNC: 0.2 MG/DL (ref 0–1.2)
BILIRUB UR QL STRIP: NEGATIVE
BUN SERPL-MCNC: 13 MG/DL (ref 8–23)
BUN/CREAT SERPL: 17.1 (ref 7–25)
CALCIUM SPEC-SCNC: 9.2 MG/DL (ref 8.6–10.5)
CANCER AG125 SERPL QL: 14.3 U/ML (ref 0–38.1)
CHLORIDE SERPL-SCNC: 108 MMOL/L (ref 98–107)
CLARITY UR: CLEAR
CO2 SERPL-SCNC: 26 MMOL/L (ref 22–29)
COLOR UR: YELLOW
CREAT SERPL-MCNC: 0.76 MG/DL (ref 0.57–1)
DEPRECATED RDW RBC AUTO: 54.9 FL (ref 37–54)
EGFRCR SERPLBLD CKD-EPI 2021: 83.4 ML/MIN/1.73
EOSINOPHIL # BLD AUTO: 0.09 10*3/MM3 (ref 0–0.4)
EOSINOPHIL NFR BLD AUTO: 1.9 % (ref 0.3–6.2)
ERYTHROCYTE [DISTWIDTH] IN BLOOD BY AUTOMATED COUNT: 15.3 % (ref 12.3–15.4)
GLOBULIN UR ELPH-MCNC: 2.5 GM/DL
GLUCOSE SERPL-MCNC: 89 MG/DL (ref 65–99)
GLUCOSE UR STRIP-MCNC: NEGATIVE MG/DL
HCT VFR BLD AUTO: 37.3 % (ref 34–46.6)
HGB BLD-MCNC: 12.2 G/DL (ref 12–15.9)
HGB UR QL STRIP.AUTO: NEGATIVE
IMM GRANULOCYTES # BLD AUTO: 0 10*3/MM3 (ref 0–0.05)
IMM GRANULOCYTES NFR BLD AUTO: 0 % (ref 0–0.5)
KETONES UR QL STRIP: NEGATIVE
LEUKOCYTE ESTERASE UR QL STRIP.AUTO: NEGATIVE
LYMPHOCYTES # BLD AUTO: 1.63 10*3/MM3 (ref 0.7–3.1)
LYMPHOCYTES NFR BLD AUTO: 34.8 % (ref 19.6–45.3)
MCH RBC QN AUTO: 31.4 PG (ref 26.6–33)
MCHC RBC AUTO-ENTMCNC: 32.7 G/DL (ref 31.5–35.7)
MCV RBC AUTO: 95.9 FL (ref 79–97)
MONOCYTES # BLD AUTO: 0.59 10*3/MM3 (ref 0.1–0.9)
MONOCYTES NFR BLD AUTO: 12.6 % (ref 5–12)
NEUTROPHILS NFR BLD AUTO: 2.35 10*3/MM3 (ref 1.7–7)
NEUTROPHILS NFR BLD AUTO: 50.1 % (ref 42.7–76)
NITRITE UR QL STRIP: NEGATIVE
PH UR STRIP.AUTO: 6.5 [PH] (ref 5–8)
PLATELET # BLD AUTO: 173 10*3/MM3 (ref 140–450)
PMV BLD AUTO: 10 FL (ref 6–12)
POTASSIUM SERPL-SCNC: 4.2 MMOL/L (ref 3.5–5.2)
PROT SERPL-MCNC: 6.3 G/DL (ref 6–8.5)
PROT UR QL STRIP: ABNORMAL
RBC # BLD AUTO: 3.89 10*6/MM3 (ref 3.77–5.28)
SODIUM SERPL-SCNC: 143 MMOL/L (ref 136–145)
SP GR UR STRIP: 1.02 (ref 1–1.03)
UROBILINOGEN UR QL STRIP: ABNORMAL
WBC NRBC COR # BLD: 4.69 10*3/MM3 (ref 3.4–10.8)

## 2023-03-15 PROCEDURE — 96417 CHEMO IV INFUS EACH ADDL SEQ: CPT

## 2023-03-15 PROCEDURE — 1160F RVW MEDS BY RX/DR IN RCRD: CPT | Performed by: OBSTETRICS & GYNECOLOGY

## 2023-03-15 PROCEDURE — 80053 COMPREHEN METABOLIC PANEL: CPT | Performed by: OBSTETRICS & GYNECOLOGY

## 2023-03-15 PROCEDURE — 25010000002 BEVACIZUMAB-BVZR 400 MG/16ML SOLUTION 16 ML VIAL: Performed by: OBSTETRICS & GYNECOLOGY

## 2023-03-15 PROCEDURE — 25010000002 CARBOPLATIN PER 50 MG: Performed by: OBSTETRICS & GYNECOLOGY

## 2023-03-15 PROCEDURE — 96415 CHEMO IV INFUSION ADDL HR: CPT

## 2023-03-15 PROCEDURE — 25010000002 DIPHENHYDRAMINE PER 50 MG: Performed by: OBSTETRICS & GYNECOLOGY

## 2023-03-15 PROCEDURE — 25010000002 FOSAPREPITANT PER 1 MG: Performed by: OBSTETRICS & GYNECOLOGY

## 2023-03-15 PROCEDURE — 81003 URINALYSIS AUTO W/O SCOPE: CPT | Performed by: OBSTETRICS & GYNECOLOGY

## 2023-03-15 PROCEDURE — 25010000002 PACLITAXEL PER 1 MG: Performed by: OBSTETRICS & GYNECOLOGY

## 2023-03-15 PROCEDURE — 85025 COMPLETE CBC W/AUTO DIFF WBC: CPT | Performed by: OBSTETRICS & GYNECOLOGY

## 2023-03-15 PROCEDURE — 1159F MED LIST DOCD IN RCRD: CPT | Performed by: OBSTETRICS & GYNECOLOGY

## 2023-03-15 PROCEDURE — 99214 OFFICE O/P EST MOD 30 MIN: CPT | Performed by: OBSTETRICS & GYNECOLOGY

## 2023-03-15 PROCEDURE — 25010000002 PALONOSETRON PER 25 MCG: Performed by: OBSTETRICS & GYNECOLOGY

## 2023-03-15 PROCEDURE — 86304 IMMUNOASSAY TUMOR CA 125: CPT | Performed by: OBSTETRICS & GYNECOLOGY

## 2023-03-15 PROCEDURE — 3080F DIAST BP >= 90 MM HG: CPT | Performed by: OBSTETRICS & GYNECOLOGY

## 2023-03-15 PROCEDURE — 96375 TX/PRO/DX INJ NEW DRUG ADDON: CPT

## 2023-03-15 PROCEDURE — 25010000002 HEPARIN LOCK FLUSH PER 10 UNITS: Performed by: OBSTETRICS & GYNECOLOGY

## 2023-03-15 PROCEDURE — 96367 TX/PROPH/DG ADDL SEQ IV INF: CPT

## 2023-03-15 PROCEDURE — 96366 THER/PROPH/DIAG IV INF ADDON: CPT

## 2023-03-15 PROCEDURE — 1126F AMNT PAIN NOTED NONE PRSNT: CPT | Performed by: OBSTETRICS & GYNECOLOGY

## 2023-03-15 PROCEDURE — 96413 CHEMO IV INFUSION 1 HR: CPT

## 2023-03-15 PROCEDURE — 3077F SYST BP >= 140 MM HG: CPT | Performed by: OBSTETRICS & GYNECOLOGY

## 2023-03-15 PROCEDURE — A9270 NON-COVERED ITEM OR SERVICE: HCPCS | Performed by: OBSTETRICS & GYNECOLOGY

## 2023-03-15 PROCEDURE — 96368 THER/DIAG CONCURRENT INF: CPT

## 2023-03-15 PROCEDURE — 25010000002 DEXAMETHASONE SODIUM PHOSPHATE 100 MG/10ML SOLUTION: Performed by: OBSTETRICS & GYNECOLOGY

## 2023-03-15 PROCEDURE — 63710000001 OLANZAPINE 5 MG TABLET: Performed by: OBSTETRICS & GYNECOLOGY

## 2023-03-15 RX ORDER — PALONOSETRON 0.05 MG/ML
0.25 INJECTION, SOLUTION INTRAVENOUS ONCE
Status: CANCELLED | OUTPATIENT
Start: 2023-03-15

## 2023-03-15 RX ORDER — SODIUM CHLORIDE 9 MG/ML
250 INJECTION, SOLUTION INTRAVENOUS ONCE
Status: COMPLETED | OUTPATIENT
Start: 2023-03-15 | End: 2023-03-15

## 2023-03-15 RX ORDER — OLANZAPINE 5 MG/1
5 TABLET ORAL ONCE
Status: COMPLETED | OUTPATIENT
Start: 2023-03-15 | End: 2023-03-15

## 2023-03-15 RX ORDER — FAMOTIDINE 10 MG/ML
20 INJECTION, SOLUTION INTRAVENOUS ONCE
Status: COMPLETED | OUTPATIENT
Start: 2023-03-15 | End: 2023-03-15

## 2023-03-15 RX ORDER — ENALAPRILAT 2.5 MG/2ML
1.25 INJECTION INTRAVENOUS EVERY 6 HOURS PRN
Status: CANCELLED
Start: 2023-03-15

## 2023-03-15 RX ORDER — FAMOTIDINE 10 MG/ML
20 INJECTION, SOLUTION INTRAVENOUS ONCE
Status: CANCELLED | OUTPATIENT
Start: 2023-03-15

## 2023-03-15 RX ORDER — HEPARIN SODIUM (PORCINE) LOCK FLUSH IV SOLN 100 UNIT/ML 100 UNIT/ML
500 SOLUTION INTRAVENOUS AS NEEDED
Status: DISCONTINUED | OUTPATIENT
Start: 2023-03-15 | End: 2023-03-16 | Stop reason: HOSPADM

## 2023-03-15 RX ORDER — PALONOSETRON 0.05 MG/ML
0.25 INJECTION, SOLUTION INTRAVENOUS ONCE
Status: COMPLETED | OUTPATIENT
Start: 2023-03-15 | End: 2023-03-15

## 2023-03-15 RX ORDER — SODIUM CHLORIDE 0.9 % (FLUSH) 0.9 %
10 SYRINGE (ML) INJECTION AS NEEDED
Status: CANCELLED | OUTPATIENT
Start: 2023-04-01

## 2023-03-15 RX ORDER — ENALAPRILAT 2.5 MG/2ML
1.25 INJECTION INTRAVENOUS EVERY 6 HOURS PRN
Status: DISCONTINUED | OUTPATIENT
Start: 2023-03-15 | End: 2023-03-16 | Stop reason: HOSPADM

## 2023-03-15 RX ORDER — FAMOTIDINE 10 MG/ML
20 INJECTION, SOLUTION INTRAVENOUS AS NEEDED
Status: CANCELLED | OUTPATIENT
Start: 2023-03-15

## 2023-03-15 RX ORDER — HEPARIN SODIUM (PORCINE) LOCK FLUSH IV SOLN 100 UNIT/ML 100 UNIT/ML
500 SOLUTION INTRAVENOUS AS NEEDED
Status: CANCELLED | OUTPATIENT
Start: 2023-04-01

## 2023-03-15 RX ORDER — SODIUM CHLORIDE 9 MG/ML
250 INJECTION, SOLUTION INTRAVENOUS ONCE
Status: CANCELLED | OUTPATIENT
Start: 2023-03-15

## 2023-03-15 RX ORDER — DIPHENHYDRAMINE HYDROCHLORIDE 50 MG/ML
50 INJECTION INTRAMUSCULAR; INTRAVENOUS AS NEEDED
Status: CANCELLED | OUTPATIENT
Start: 2023-03-15

## 2023-03-15 RX ORDER — HYDROCHLOROTHIAZIDE 25 MG/1
25 TABLET ORAL DAILY
Qty: 30 TABLET | Refills: 0 | Status: SHIPPED | OUTPATIENT
Start: 2023-03-15

## 2023-03-15 RX ORDER — OLANZAPINE 5 MG/1
5 TABLET ORAL ONCE
Status: CANCELLED | OUTPATIENT
Start: 2023-03-15 | End: 2023-03-15

## 2023-03-15 RX ADMIN — SODIUM CHLORIDE 150 MG: 9 INJECTION, SOLUTION INTRAVENOUS at 10:32

## 2023-03-15 RX ADMIN — DIPHENHYDRAMINE HYDROCHLORIDE 50 MG: 50 INJECTION INTRAMUSCULAR; INTRAVENOUS at 11:07

## 2023-03-15 RX ADMIN — SODIUM CHLORIDE 740 MG: 9 INJECTION, SOLUTION INTRAVENOUS at 11:36

## 2023-03-15 RX ADMIN — DEXAMETHASONE SODIUM PHOSPHATE 20 MG: 10 INJECTION, SOLUTION INTRAMUSCULAR; INTRAVENOUS at 10:32

## 2023-03-15 RX ADMIN — SODIUM CHLORIDE 250 ML: 9 INJECTION, SOLUTION INTRAVENOUS at 10:32

## 2023-03-15 RX ADMIN — OLANZAPINE 5 MG: 5 TABLET, FILM COATED ORAL at 11:06

## 2023-03-15 RX ADMIN — PALONOSETRON HYDROCHLORIDE 0.25 MG: 0.25 INJECTION, SOLUTION INTRAVENOUS at 11:07

## 2023-03-15 RX ADMIN — HEPARIN 500 UNITS: 100 SYRINGE at 16:16

## 2023-03-15 RX ADMIN — CARBOPLATIN 380 MG: 10 INJECTION, SOLUTION INTRAVENOUS at 15:35

## 2023-03-15 RX ADMIN — FAMOTIDINE 20 MG: 10 INJECTION INTRAVENOUS at 11:07

## 2023-03-15 RX ADMIN — PACLITAXEL 250 MG: 6 INJECTION, SOLUTION INTRAVENOUS at 12:25

## 2023-03-17 DIAGNOSIS — R60.9 SWELLING: Primary | ICD-10-CM

## 2023-03-17 RX ORDER — POTASSIUM CHLORIDE 750 MG/1
10 TABLET, FILM COATED, EXTENDED RELEASE ORAL 2 TIMES DAILY PRN
Qty: 10 TABLET | Refills: 0 | Status: SHIPPED | OUTPATIENT
Start: 2023-03-17 | End: 2023-04-05 | Stop reason: SDUPTHER

## 2023-03-17 RX ORDER — FUROSEMIDE 20 MG/1
20 TABLET ORAL 2 TIMES DAILY PRN
Qty: 10 TABLET | Refills: 0 | Status: SHIPPED | OUTPATIENT
Start: 2023-03-17 | End: 2023-04-05 | Stop reason: SDUPTHER

## 2023-03-17 NOTE — TELEPHONE ENCOUNTER
RN conferred with MD about pt's c/o swelling. MD ordered lasix with Potassium supplement prn. MD also recommended pt f/u with her PCP for blood pressure and swelling issues.   RN called pt and discussed above plan with pt. Pt reported an improvement in her BP after beginning the hydrochlorothiazide. Pt is doing daily weights and was down this morning.  Pt verbalized understanding to take Lasix along with Potassium if swelling continues.

## 2023-03-17 NOTE — TELEPHONE ENCOUNTER
Caller: Eugenia Tavares    Relationship: Self    Best call back number: [phone]    What is the best time to reach you: ANY    Who are you requesting to speak with (clinical staff, provider,  specific staff member): CLINICAL    What was the call regarding: EUGENIA IS CALLING STATES SHE HAS A LOT OF SWELLING IN HER LEGS AND FEET  SHE STATES SHE HAD PROTEIN IN HER URINE AND SHE STATES SHE HAS NOT BEEN URINATING VERY FREQUENTLY  EUGENIA ALSO STATES SHE TAKES 3 CHEMO PILLS AND SHE FORGOT TO TAKE ONE OF HER PILL LAST NIGHT      Do you require a callback: YES

## 2023-03-20 ENCOUNTER — TELEPHONE (OUTPATIENT)
Dept: GYNECOLOGIC ONCOLOGY | Facility: CLINIC | Age: 73
End: 2023-03-20
Payer: MEDICARE

## 2023-03-20 NOTE — TELEPHONE ENCOUNTER
Caller: Eugenia Tavares    Relationship: Self    Best call back number: [phone]    What is the best time to reach you: ANY    Who are you requesting to speak with (clinical staff, provider,  specific staff member): DR HOLLIS OR NURSE        What was the call regarding:     BLOOD PRESSURE THIS MORNING BEFORE TAKING MEDICATION /99     NOTICING IS STILL  NOT COMING DOWN WITH BP MEDICATION     PLEASE CALL TO DISCUSS     Do you require a callback: YES           
RN called patient to inquire about her blood pressure. Patient stated that it was 171/99 and 170/97 this morning, and she has an appointment with her PCP on Friday. She is still taking the HCTZ, and her swelling has gone down since starting the PRN lasix prescribed last week. RN let the patient know that per Dr. Bess's last note, she should follow up with her PCP for BP and swelling issues. Patient stated she is going Friday morning to her PCP, but just wanted to let Dr. Bess know because she was concerned. RN told patient she would let Dr. Bess know.   
negative/4/21/18

## 2023-04-04 PROBLEM — T50.905A HYPERTENSION DUE TO DRUG: Status: ACTIVE | Noted: 2023-04-04

## 2023-04-04 PROBLEM — I15.8 HYPERTENSION DUE TO DRUG: Status: ACTIVE | Noted: 2023-04-04

## 2023-04-04 NOTE — PROGRESS NOTES
[facility]  [id number]  1950    Reason for visit: History of ovarian cancer now with biopsy-proven lymph node recurrence in the chest, chemotherapy consideration and toxicity assessment.     History of present illness:  The patient is a 72 y.o. year old female who presents today for treatment and evaluation of the above issues.    She is 4 years out from completion of treatment. Last  26.9 on 1/4. Had CT scan on 06/2022 which showed slowly enlarging bilateral pericardial fat pad lymph notes. 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      She presents today for consideration for cycle #5 of Carboplatin/Paclitaxel/Bevacizumab.   She continues to walk for exercise.    Regarding her blood pressure she notes blood pressure at home is 150 systolic.  Is elevated today, but she notes that she is little nervous getting roomed  Today, she notes normal appetite.  She reports normal bowel function and bladder function.  She reports good energy and is performing ADLs.  She notes stable neuropathy and no mucositis.  Other issues include in some extremity swelling congestion symptoms typical for bevacizumab treatment.  Now on lisinopril for blood pressure.  Requests refills for her Lasix and potassium.    Oncologic History:  Oncology/Hematology History   Ovarian cancer, bilateral   6/22/2018 Initial Diagnosis    Abnormal examination by GI for patient c/o bloating and diarrhea, sent to GYN. TVUS concerning for malignancy. CT showed multiple cystic masses on bilateral ovaries, possible omental implants, and large volume ascites. Paracentesis performed and cytology returned positive for metastatic adenocarcinoma, favor GYN origin. CA-125 at diagnosis = 907.7. Referred to Gyn Oncology.      6/28/2018 Procedure    Port-a-cath placement     7/6/2018 - 8/16/2018 Chemotherapy    OP OVARIAN PACLitaxel / CARBOplatin  (Q21D)  Neoadjuvant x 3 cycles     9/6/2018 Imaging    Interval CT showed improvement in ascites and resolution of previous left pleural effusion     9/11/2018 Surgery    Exploratory laparotomy, EMMA/BSO, debulking to R=0, and partial rectal resection. 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 Primary tumor site thought to be left fallopian tube. Bilateral ovarian, tubal, and surface involvement. Omentum, pelvic peritoneum, colon serosa, and rectal serosa involved. Macroscopic extrapelvic peritoneal implants also found. Stage IIIB grade 3         10/3/2018 - 11/29/2018 Chemotherapy    OP OVARIAN PACLitaxel / CARBOplatin AUC=6 / Bevacizumab 15 mg/kg  3 cycles adjuvant chemotherapy planned.     Patient chose to decline Avastin with all adjuvant cycles.     10/19/2018 Genetic Testing    Counseling and testing completed via [facility] ITt panel. 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 Patient does not have disease, but is a carrier.     1/3/2019 Imaging    Post-treatment CT chest, abdomen, and pelvis. No ascites, nodularity, or residual disease seen. No evidence of progression or active malignancy.      3/21/2019 Imaging    CT chest, abdomen, pelvis for diffuse abdominal pain. Study negative for recurrent disease     12/29/2020 Imaging    CT chest, abdomen, pelvis prior to port removal:  Stable examination with no CT evidence of acute intrathoracic, intra-abdominal or pelvic abnormality. No evidence of progression or metastatic disease.     9/10/2021 Imaging    CT chest, abdomen, pelvis:  Stable appearance of the chest abdomen and pelvis without acute pathology or evidence for occurrence or active metastatic disease     6/22/2022 Imaging    CT chest, abdomen, pelvis:  No evidence of metastatic disease within the abdomen or pelvis. Slowly enlarging bilateral pericardial fat pad lymph notes. Findings  are  nonspecific given the slow progression since 2020, but are  concerning for metastatic disease or lymphoma.     8/22/2022 Progression    CT biopsy left pericardial lymph node. 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     9/20/2022 Molecular Testing    CARIS results:    PD-L1 positive  ER positive 2+, 75%  MSI stable, MMR proficient, MEGAN low, TMB low  PAMELA, BRCA 1/2, RAD51 C/D all negative  NE negative  Her2/Kike negative     12/13/2022 - 1/25/2023 Chemotherapy    OP OVARIAN PACLitaxel / CARBOplatin (Q21D)     2/22/2023 -  Chemotherapy    OP OVARIAN PACLitaxel / CARBOplatin AUC=6 / Bevacizumab 15 mg/kg     4/26/2023 -  Chemotherapy    OP OVARIAN Bevacizumab 15 mg/kg (Maintenance)           Past Medical History:   Diagnosis Date   • Hypertension    • Ovarian cancer 2018    chemotherapy    • Wears glasses        Past Surgical History:   Procedure Laterality Date   • APPENDECTOMY      possibly with tubal (not certain)   • BREAST BIOPSY Bilateral 1972    cysts removed    • COLONOSCOPY     • EXPLORATORY LAPAROTOMY, TOTAL ABDOMINAL HYSTERECTOMY SALPINGO OOPHORECTOMY N/A 09/11/2018    Procedure: LAPAROTOMY EXPLORATORY, TOTAL ABDOMINAL HYSTERECTOMY, BILATERAL SALPINGO OOPHORECTOMY, DEBULKING;  Surgeon: Shannan Bess MD;  Location: [facility];  Service: Gynecology   • HERNIA REPAIR Right     inguinal    • SUBLINGUAL SALIVARY CYST EXCISION Right 2020    at [facility]   • TUBAL ABDOMINAL LIGATION         MEDICATIONS: The current medication list was reviewed with the patient and updated in the EMR this date per the Medical Assistant. Medication dosages and frequencies were confirmed to be accurate.      Allergies:  has No Known Allergies.    Social History:   Social History     Socioeconomic History   • Marital status:    Tobacco Use   • Smoking status: Never   • Smokeless tobacco: Never   Vaping Use   • Vaping Use: Never used   Substance and Sexual Activity   • Alcohol use: No   • Drug use: No   • Sexual  "activity: Defer       Family History:    Family History   Adopted: Yes   Family history unknown: Yes       Health Maintenance:    Health Maintenance   Topic Date Due   • Pneumococcal Vaccine 65+ (1 - PCV) Never done   • TDAP/TD VACCINES (1 - Tdap) Never done   • ZOSTER VACCINE (1 of 2) Never done   • HEPATITIS C SCREENING  Never done   • ANNUAL WELLNESS VISIT  Never done   • LIPID PANEL  11/03/2021   • COVID-19 Vaccine (4 - Booster for Pfizer series) 11/22/2021   • DXA SCAN  03/22/2023   • INFLUENZA VACCINE  08/01/2023   • MAMMOGRAM  01/27/2024   • COLORECTAL CANCER SCREENING  04/24/2028       Review of Systems  Please refer to history of present illness, review of systems otherwise negative.    Vitals:    04/05/23 0905   BP: (!) 181/90   Pulse: 80   Resp: 18   Temp: 97.4 °F (36.3 °C)   TempSrc: Temporal   SpO2: 98%   Weight: 49.2 kg (108 lb 6.4 oz)   Height: 149.9 cm (59\")   PainSc: 0-No pain       Body mass index is 21.89 kg/m².  Wt Readings from Last 3 Encounters:   04/05/23 49.2 kg (108 lb 6.4 oz)   03/15/23 51.2 kg (112 lb 12.8 oz)   01/25/23 49.4 kg (108 lb 12.8 oz)       GENERAL: Alert, well-appearing female appearing her stated age who is in no apparent distress.   HEENT: Sclera anicteric. Head normocephalic, atraumatic.  Wearing mask.  NECK: No thyromegaly, supple  BREASTS: Deferred  CARDIOVASCULAR: Normal rate, regular rhythm.  No murmurs, rubs, gallops.  No peripheral edema.  RESPIRATORY: Lungs clear to auscultation bilaterally, normal respiratory effort on room air  BACK: Deferred  GASTROINTESTINAL: No tenderness, no distinct mass, no distention  SKIN:  Warm, dry, well-perfused.  All visible areas intact. Port in left side of chest accessed without issue  PSYCHIATRIC: AO x3, with appropriate affect, normal thought processes.  Mood and affect appropriate.  NEUROLOGIC: No focal deficits.  Moves extremities well.  MUSCULOSKELETAL: Normal gait and station.   EXTREMITIES:   No cyanosis, clubbing, " symmetric.  LYMPHATICS: No cervical adenopathy     PELVIC exam:    Deferred    ECOG PS 0    PROCEDURES: None    Diagnostic Data:     No radiology results for the last 30 days.    Lab Results   Component Value Date    WBC 4.21 04/05/2023    HGB 13.3 04/05/2023    HCT 39.4 04/05/2023    MCV 94.3 04/05/2023     04/05/2023    NEUTROABS 2.09 04/05/2023    GLUCOSE 89 03/15/2023    BUN 13 03/15/2023    CREATININE 0.76 03/15/2023    EGFRIFNONA 70 11/29/2018     03/15/2023    K 4.2 03/15/2023     (H) 03/15/2023    CO2 26.0 03/15/2023    MG 2.1 06/20/2018    PHOS 4.6 06/20/2018    CALCIUM 9.2 03/15/2023    ALBUMIN 3.8 03/15/2023    AST 49 (H) 03/15/2023    ALT 89 (H) 03/15/2023    BILITOT 0.2 03/15/2023     Lab Results   Component Value Date     14.3 03/15/2023     17.8 02/22/2023     21.8 01/25/2023     26.9 01/04/2023     46.9 (H) 12/12/2022     42.4 (H) 11/02/2022     40.5 (H) 09/28/2022     33.2 05/17/2022     25.7 11/17/2021     30.5 08/23/2021         Assessment & Plan   This is a 72 y.o. 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    Encounter Diagnoses   Name Primary?   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  No chemotherapy dose reduction.  Hold bevacizumab due to 2+ proteinuria.  24-hour urine ordered.  I would still anticipate should start single agent bevacizumab as a maintenance drug with every 3 week infusions and a follow-up for  starting that in 3 weeks time pending CT scan and 24-hour urine.  Will continue bevacizumab as a maintenance drug after completion of cytotoxic chemotherapy.  -Post cytotoxic chemotherapy CT scans ordered.    Chemotherapy-induced neuropathy  -mild at baseline since chemotherapy in 2018  -stable     Hypertension  - started bevacizumab in Feb. 2023  -On lisinopril    Proteinuria  -See the above    Routine Health Maintenance Screening  - Counseled that mammogram and dental cleanings can be performed without additional precautions  - Patient due for colonoscopy. Counseled that although this is a minor procedure, recommend waiting until completion of carbo/taxol/bevacizumab cycle #6, and complete colonoscopy a few days prior to beginning next bevacizumab.  Typically, bevacizumab was held for weeks before and after major surgical procedures.  I usually hold bevacizumab for 3 weeks prior to and 2 weeks after Port-A-Cath placement because its vascular procedure.     - Patient sees Dr. Salazar. Instructed to check with Dr. Salazar about if bevacizumab needs to be held for specified amount of time before and after colonoscopy.     Orders Placed This Encounter   Procedures   • CT Chest With Contrast     Standing Status:   Future     Standing Expiration Date:   4/4/2024     Order Specific Question:   Release to patient     Answer:   Routine Release   • CT Abdomen Pelvis With Contrast     Standing Status:   Future     Standing Expiration Date:   4/3/2024     Order Specific Question:   Will Oral Contrast be needed for this procedure?     Answer:   Yes   • Comprehensive metabolic panel     Standing Status:   Future     Number of Occurrences:   1     Standing Expiration Date:   4/5/2024     Order Specific Question:   Release to patient     Answer:   Routine Release   • Urinalysis without microscopic (no culture) - Urine, Clean Catch     Standing Status:   Future     Number of Occurrences:   1     Standing Expiration Date:   4/5/2024      Order Specific Question:   Release to patient     Answer:   Routine Release   •      Standing Status:   Future     Number of Occurrences:   1     Standing Expiration Date:   4/5/2024     Order Specific Question:   Release to patient     Answer:   Routine Release   • Protein, Urine, 24 Hour - Urine, Clean Catch     Standing Status:   Future     Standing Expiration Date:   4/5/2024     Order Specific Question:   Release to patient     Answer:   Routine Release   • CBC and Differential     Standing Status:   Future     Number of Occurrences:   1     Standing Expiration Date:   4/5/2024     Order Specific Question:   Manual Differential     Answer:   No     Order Specific Question:   Release to patient     Answer:   Routine Release     FOLLOW UP: 3 weeks  I spent 50 minutes caring for Eugenia on this date of service. This time includes time spent by me in the following activities: preparing for the visit, reviewing tests, performing a medically appropriate examination and/or evaluation, counseling and educating the patient/family/caregiver, ordering medications, tests, or procedures, referring and communicating with other health care professionals and documenting information in the medical record    Patient was seen and examined with Dr. Marina,  resident, who performed portions of the examination and documentation for this patient's care under my direct supervision.  I agree with the above documentation and plan.    Shannan Bess MD  04/05/23  09:52 EDT

## 2023-04-05 ENCOUNTER — OFFICE VISIT (OUTPATIENT)
Dept: GYNECOLOGIC ONCOLOGY | Facility: CLINIC | Age: 73
End: 2023-04-05
Payer: MEDICARE

## 2023-04-05 ENCOUNTER — HOSPITAL ENCOUNTER (OUTPATIENT)
Dept: ONCOLOGY | Facility: HOSPITAL | Age: 73
Discharge: HOME | End: 2023-04-05
Admitting: OBSTETRICS & GYNECOLOGY
Payer: MEDICARE

## 2023-04-05 ENCOUNTER — HOSPITAL ENCOUNTER (OUTPATIENT)
Dept: ONCOLOGY | Facility: HOSPITAL | Age: 73
End: 2023-04-05
Payer: MEDICARE

## 2023-04-05 VITALS — HEART RATE: 66 BPM | SYSTOLIC BLOOD PRESSURE: 146 MMHG | DIASTOLIC BLOOD PRESSURE: 76 MMHG

## 2023-04-05 VITALS
DIASTOLIC BLOOD PRESSURE: 90 MMHG | RESPIRATION RATE: 18 BRPM | HEART RATE: 80 BPM | HEIGHT: 59 IN | WEIGHT: 108.4 LBS | OXYGEN SATURATION: 98 % | TEMPERATURE: 97.4 F | BODY MASS INDEX: 21.85 KG/M2 | SYSTOLIC BLOOD PRESSURE: 181 MMHG

## 2023-04-05 DIAGNOSIS — T50.905A HYPERTENSION DUE TO DRUG: ICD-10-CM

## 2023-04-05 DIAGNOSIS — C56.3 OVARIAN CANCER, BILATERAL: Primary | ICD-10-CM

## 2023-04-05 DIAGNOSIS — R80.9 PROTEINURIA, UNSPECIFIED TYPE: ICD-10-CM

## 2023-04-05 DIAGNOSIS — R60.9 SWELLING: ICD-10-CM

## 2023-04-05 DIAGNOSIS — I15.8 HYPERTENSION DUE TO DRUG: ICD-10-CM

## 2023-04-05 DIAGNOSIS — C77.1 METASTASIS TO MEDIASTINAL LYMPH NODE: ICD-10-CM

## 2023-04-05 LAB
ALBUMIN SERPL-MCNC: 3.9 G/DL (ref 3.5–5.2)
ALBUMIN/GLOB SERPL: 1.3 G/DL
ALP SERPL-CCNC: 26 U/L (ref 39–117)
ALT SERPL W P-5'-P-CCNC: 13 U/L (ref 1–33)
ANION GAP SERPL CALCULATED.3IONS-SCNC: 13 MMOL/L (ref 5–15)
AST SERPL-CCNC: 20 U/L (ref 1–32)
BASOPHILS # BLD AUTO: 0.02 10*3/MM3 (ref 0–0.2)
BASOPHILS NFR BLD AUTO: 0.5 % (ref 0–1.5)
BILIRUB SERPL-MCNC: 0.2 MG/DL (ref 0–1.2)
BILIRUB UR QL STRIP: NEGATIVE
BUN SERPL-MCNC: 17 MG/DL (ref 8–23)
BUN/CREAT SERPL: 19.5 (ref 7–25)
CALCIUM SPEC-SCNC: 9.5 MG/DL (ref 8.6–10.5)
CANCER AG125 SERPL QL: 17.5 U/ML (ref 0–38.1)
CHLORIDE SERPL-SCNC: 98 MMOL/L (ref 98–107)
CLARITY UR: CLEAR
CO2 SERPL-SCNC: 25 MMOL/L (ref 22–29)
COLOR UR: YELLOW
CREAT SERPL-MCNC: 0.87 MG/DL (ref 0.57–1)
DEPRECATED RDW RBC AUTO: 49.3 FL (ref 37–54)
EGFRCR SERPLBLD CKD-EPI 2021: 70.9 ML/MIN/1.73
EOSINOPHIL # BLD AUTO: 0.1 10*3/MM3 (ref 0–0.4)
EOSINOPHIL NFR BLD AUTO: 2.4 % (ref 0.3–6.2)
ERYTHROCYTE [DISTWIDTH] IN BLOOD BY AUTOMATED COUNT: 14.1 % (ref 12.3–15.4)
GLOBULIN UR ELPH-MCNC: 3 GM/DL
GLUCOSE SERPL-MCNC: 104 MG/DL (ref 65–99)
GLUCOSE UR STRIP-MCNC: NEGATIVE MG/DL
HCT VFR BLD AUTO: 39.4 % (ref 34–46.6)
HGB BLD-MCNC: 13.3 G/DL (ref 12–15.9)
HGB UR QL STRIP.AUTO: ABNORMAL
IMM GRANULOCYTES # BLD AUTO: 0.01 10*3/MM3 (ref 0–0.05)
IMM GRANULOCYTES NFR BLD AUTO: 0.2 % (ref 0–0.5)
KETONES UR QL STRIP: NEGATIVE
LEUKOCYTE ESTERASE UR QL STRIP.AUTO: NEGATIVE
LYMPHOCYTES # BLD AUTO: 1.41 10*3/MM3 (ref 0.7–3.1)
LYMPHOCYTES NFR BLD AUTO: 33.5 % (ref 19.6–45.3)
MCH RBC QN AUTO: 31.8 PG (ref 26.6–33)
MCHC RBC AUTO-ENTMCNC: 33.8 G/DL (ref 31.5–35.7)
MCV RBC AUTO: 94.3 FL (ref 79–97)
MONOCYTES # BLD AUTO: 0.58 10*3/MM3 (ref 0.1–0.9)
MONOCYTES NFR BLD AUTO: 13.8 % (ref 5–12)
NEUTROPHILS NFR BLD AUTO: 2.09 10*3/MM3 (ref 1.7–7)
NEUTROPHILS NFR BLD AUTO: 49.6 % (ref 42.7–76)
NITRITE UR QL STRIP: NEGATIVE
PH UR STRIP.AUTO: 6.5 [PH] (ref 5–8)
PLATELET # BLD AUTO: 230 10*3/MM3 (ref 140–450)
PMV BLD AUTO: 9.4 FL (ref 6–12)
POTASSIUM SERPL-SCNC: 4 MMOL/L (ref 3.5–5.2)
PROT SERPL-MCNC: 6.9 G/DL (ref 6–8.5)
PROT UR QL STRIP: ABNORMAL
RBC # BLD AUTO: 4.18 10*6/MM3 (ref 3.77–5.28)
SODIUM SERPL-SCNC: 136 MMOL/L (ref 136–145)
SP GR UR STRIP: 1.01 (ref 1–1.03)
UROBILINOGEN UR QL STRIP: ABNORMAL
WBC NRBC COR # BLD: 4.21 10*3/MM3 (ref 3.4–10.8)

## 2023-04-05 PROCEDURE — 3080F DIAST BP >= 90 MM HG: CPT | Performed by: OBSTETRICS & GYNECOLOGY

## 2023-04-05 PROCEDURE — 63710000001 OLANZAPINE 5 MG TABLET: Performed by: OBSTETRICS & GYNECOLOGY

## 2023-04-05 PROCEDURE — 85025 COMPLETE CBC W/AUTO DIFF WBC: CPT | Performed by: OBSTETRICS & GYNECOLOGY

## 2023-04-05 PROCEDURE — 3077F SYST BP >= 140 MM HG: CPT | Performed by: OBSTETRICS & GYNECOLOGY

## 2023-04-05 PROCEDURE — 25010000002 PALONOSETRON PER 25 MCG: Performed by: OBSTETRICS & GYNECOLOGY

## 2023-04-05 PROCEDURE — 25010000002 DIPHENHYDRAMINE PER 50 MG: Performed by: OBSTETRICS & GYNECOLOGY

## 2023-04-05 PROCEDURE — 96415 CHEMO IV INFUSION ADDL HR: CPT

## 2023-04-05 PROCEDURE — 81003 URINALYSIS AUTO W/O SCOPE: CPT | Performed by: OBSTETRICS & GYNECOLOGY

## 2023-04-05 PROCEDURE — A9270 NON-COVERED ITEM OR SERVICE: HCPCS | Performed by: OBSTETRICS & GYNECOLOGY

## 2023-04-05 PROCEDURE — 25010000002 HEPARIN LOCK FLUSH PER 10 UNITS: Performed by: OBSTETRICS & GYNECOLOGY

## 2023-04-05 PROCEDURE — 96417 CHEMO IV INFUS EACH ADDL SEQ: CPT

## 2023-04-05 PROCEDURE — 96368 THER/DIAG CONCURRENT INF: CPT

## 2023-04-05 PROCEDURE — 1160F RVW MEDS BY RX/DR IN RCRD: CPT | Performed by: OBSTETRICS & GYNECOLOGY

## 2023-04-05 PROCEDURE — 25010000002 FOSAPREPITANT PER 1 MG: Performed by: OBSTETRICS & GYNECOLOGY

## 2023-04-05 PROCEDURE — 25010000002 CARBOPLATIN PER 50 MG: Performed by: OBSTETRICS & GYNECOLOGY

## 2023-04-05 PROCEDURE — 96413 CHEMO IV INFUSION 1 HR: CPT

## 2023-04-05 PROCEDURE — 1159F MED LIST DOCD IN RCRD: CPT | Performed by: OBSTETRICS & GYNECOLOGY

## 2023-04-05 PROCEDURE — 86304 IMMUNOASSAY TUMOR CA 125: CPT | Performed by: OBSTETRICS & GYNECOLOGY

## 2023-04-05 PROCEDURE — 99215 OFFICE O/P EST HI 40 MIN: CPT | Performed by: OBSTETRICS & GYNECOLOGY

## 2023-04-05 PROCEDURE — 80053 COMPREHEN METABOLIC PANEL: CPT | Performed by: OBSTETRICS & GYNECOLOGY

## 2023-04-05 PROCEDURE — 25010000002 PACLITAXEL PER 1 MG: Performed by: OBSTETRICS & GYNECOLOGY

## 2023-04-05 PROCEDURE — 1126F AMNT PAIN NOTED NONE PRSNT: CPT | Performed by: OBSTETRICS & GYNECOLOGY

## 2023-04-05 PROCEDURE — 96375 TX/PRO/DX INJ NEW DRUG ADDON: CPT

## 2023-04-05 PROCEDURE — 25010000002 DEXAMETHASONE PER 1 MG: Performed by: OBSTETRICS & GYNECOLOGY

## 2023-04-05 RX ORDER — PALONOSETRON 0.05 MG/ML
0.25 INJECTION, SOLUTION INTRAVENOUS ONCE
Status: CANCELLED | OUTPATIENT
Start: 2023-04-05

## 2023-04-05 RX ORDER — DIPHENHYDRAMINE HYDROCHLORIDE 50 MG/ML
50 INJECTION INTRAMUSCULAR; INTRAVENOUS AS NEEDED
Status: CANCELLED | OUTPATIENT
Start: 2023-04-05

## 2023-04-05 RX ORDER — LISINOPRIL 5 MG/1
5 TABLET ORAL DAILY
COMMUNITY
Start: 2023-03-24

## 2023-04-05 RX ORDER — PALONOSETRON 0.05 MG/ML
0.25 INJECTION, SOLUTION INTRAVENOUS ONCE
Status: COMPLETED | OUTPATIENT
Start: 2023-04-05 | End: 2023-04-05

## 2023-04-05 RX ORDER — SODIUM CHLORIDE 9 MG/ML
250 INJECTION, SOLUTION INTRAVENOUS ONCE
Status: CANCELLED | OUTPATIENT
Start: 2023-04-05

## 2023-04-05 RX ORDER — FAMOTIDINE 10 MG/ML
20 INJECTION, SOLUTION INTRAVENOUS AS NEEDED
Status: CANCELLED | OUTPATIENT
Start: 2023-04-05

## 2023-04-05 RX ORDER — SODIUM CHLORIDE 0.9 % (FLUSH) 0.9 %
10 SYRINGE (ML) INJECTION AS NEEDED
OUTPATIENT
Start: 2023-07-01

## 2023-04-05 RX ORDER — FAMOTIDINE 10 MG/ML
20 INJECTION, SOLUTION INTRAVENOUS ONCE
Status: CANCELLED | OUTPATIENT
Start: 2023-04-05

## 2023-04-05 RX ORDER — SODIUM CHLORIDE 9 MG/ML
250 INJECTION, SOLUTION INTRAVENOUS ONCE
Status: COMPLETED | OUTPATIENT
Start: 2023-04-05 | End: 2023-04-05

## 2023-04-05 RX ORDER — HEPARIN SODIUM (PORCINE) LOCK FLUSH IV SOLN 100 UNIT/ML 100 UNIT/ML
500 SOLUTION INTRAVENOUS AS NEEDED
Status: DISCONTINUED | OUTPATIENT
Start: 2023-04-05 | End: 2023-04-06 | Stop reason: HOSPADM

## 2023-04-05 RX ORDER — POTASSIUM CHLORIDE 750 MG/1
10 TABLET, FILM COATED, EXTENDED RELEASE ORAL 2 TIMES DAILY PRN
Qty: 10 TABLET | Refills: 0 | Status: SHIPPED | OUTPATIENT
Start: 2023-04-05 | End: 2023-04-07

## 2023-04-05 RX ORDER — FUROSEMIDE 20 MG/1
20 TABLET ORAL 2 TIMES DAILY PRN
Qty: 10 TABLET | Refills: 0 | Status: SHIPPED | OUTPATIENT
Start: 2023-04-05 | End: 2023-04-07

## 2023-04-05 RX ORDER — FAMOTIDINE 10 MG/ML
20 INJECTION, SOLUTION INTRAVENOUS ONCE
Status: COMPLETED | OUTPATIENT
Start: 2023-04-05 | End: 2023-04-05

## 2023-04-05 RX ORDER — OLANZAPINE 5 MG/1
5 TABLET ORAL ONCE
Status: CANCELLED | OUTPATIENT
Start: 2023-04-05 | End: 2023-04-05

## 2023-04-05 RX ORDER — HEPARIN SODIUM (PORCINE) LOCK FLUSH IV SOLN 100 UNIT/ML 100 UNIT/ML
500 SOLUTION INTRAVENOUS AS NEEDED
OUTPATIENT
Start: 2023-07-01

## 2023-04-05 RX ORDER — OLANZAPINE 5 MG/1
5 TABLET ORAL ONCE
Status: COMPLETED | OUTPATIENT
Start: 2023-04-05 | End: 2023-04-05

## 2023-04-05 RX ADMIN — PALONOSETRON HYDROCHLORIDE 0.25 MG: 0.25 INJECTION, SOLUTION INTRAVENOUS at 10:45

## 2023-04-05 RX ADMIN — OLANZAPINE 5 MG: 5 TABLET, FILM COATED ORAL at 10:45

## 2023-04-05 RX ADMIN — HEPARIN 500 UNITS: 100 SYRINGE at 15:50

## 2023-04-05 RX ADMIN — SODIUM CHLORIDE 150 MG: 9 INJECTION, SOLUTION INTRAVENOUS at 11:04

## 2023-04-05 RX ADMIN — DEXAMETHASONE SODIUM PHOSPHATE 20 MG: 4 INJECTION, SOLUTION INTRAMUSCULAR; INTRAVENOUS at 10:48

## 2023-04-05 RX ADMIN — DIPHENHYDRAMINE HYDROCHLORIDE 50 MG: 50 INJECTION, SOLUTION INTRAMUSCULAR; INTRAVENOUS at 10:48

## 2023-04-05 RX ADMIN — FAMOTIDINE 20 MG: 10 INJECTION INTRAVENOUS at 10:48

## 2023-04-05 RX ADMIN — PACLITAXEL 250 MG: 6 INJECTION, SOLUTION INTRAVENOUS at 12:00

## 2023-04-05 RX ADMIN — SODIUM CHLORIDE 250 ML: 9 INJECTION, SOLUTION INTRAVENOUS at 10:43

## 2023-04-05 RX ADMIN — CARBOPLATIN 380 MG: 10 INJECTION, SOLUTION INTRAVENOUS at 15:13

## 2023-04-07 ENCOUNTER — LAB (OUTPATIENT)
Dept: LAB | Facility: HOSPITAL | Age: 73
End: 2023-04-07
Payer: MEDICARE

## 2023-04-07 DIAGNOSIS — R80.9 PROTEINURIA, UNSPECIFIED TYPE: ICD-10-CM

## 2023-04-07 DIAGNOSIS — R60.9 SWELLING: ICD-10-CM

## 2023-04-07 LAB
COLLECT DURATION TIME UR: 24 HRS
PROT 24H UR-MRATE: 627 MG/24HOURS (ref 0–150)
SPECIMEN VOL 24H UR: 3000 ML

## 2023-04-07 PROCEDURE — 81050 URINALYSIS VOLUME MEASURE: CPT

## 2023-04-07 PROCEDURE — 84156 ASSAY OF PROTEIN URINE: CPT

## 2023-04-07 RX ORDER — FUROSEMIDE 20 MG/1
TABLET ORAL
Qty: 10 TABLET | Refills: 0 | Status: SHIPPED | OUTPATIENT
Start: 2023-04-07

## 2023-04-07 RX ORDER — POTASSIUM CHLORIDE 750 MG/1
TABLET, FILM COATED, EXTENDED RELEASE ORAL
Qty: 10 TABLET | Refills: 0 | Status: SHIPPED | OUTPATIENT
Start: 2023-04-07

## 2023-04-25 NOTE — PROGRESS NOTES
Lyndsey CHRISTIE [facility]  [id number]  1950    Reason for visit: History of ovarian cancer now with biopsy-proven lymph node recurrence in the chest, chemotherapy consideration and toxicity assessment.     History of present illness:  The patient is a 72 y.o. year old female who presents today for treatment and evaluation of the above issues.    She is 4 years out from completion of treatment. Last  26.9 on 1/4. Had CT scan on 06/2022 which showed slowly enlarging bilateral pericardial fat pad lymph notes. 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 She is s/p 6 cycles of Carboplatin/Paclitaxel/Bevacizumab. Post cytotoxic chemotherapy CT scans ordered.    She presents today for consideration for single agent bevacizumab as a maintenance drug.   She continues to walk for exercise.    Today, she notes normal appetite.  She reports normal bowel function and bladder function.  She reports good energy and is performing ADLs.  She notes stable neuropathy and no mucositis.  Other issues include in some extremity swelling congestion symptoms typical for bevacizumab treatment.  Now on lisinopril for blood pressure.      Oncologic History:  Oncology/Hematology History   Ovarian cancer, bilateral   6/22/2018 Initial Diagnosis    Abnormal examination by GI for patient c/o bloating and diarrhea, sent to GYN. TVUS concerning for malignancy. CT showed multiple cystic masses on bilateral ovaries, possible omental implants, and large volume ascites. Paracentesis performed and cytology returned positive for metastatic adenocarcinoma, favor GYN origin. CA-125 at diagnosis = 907.7. Referred to Gyn Oncology.      6/28/2018 Procedure    Port-a-cath placement     7/6/2018 - 8/16/2018 Chemotherapy    OP OVARIAN PACLitaxel / CARBOplatin (Q21D)  Neoadjuvant x 3 cycles     9/6/2018 Imaging    Interval CT showed improvement in ascites and  resolution of previous left pleural effusion     9/11/2018 Surgery    Exploratory laparotomy, EMMA/BSO, debulking to R=0, and partial rectal resection. 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 Primary tumor site thought to be left fallopian tube. Bilateral ovarian, tubal, and surface involvement. Omentum, pelvic peritoneum, colon serosa, and rectal serosa involved. Macroscopic extrapelvic peritoneal implants also found. Stage IIIB grade 3         10/3/2018 - 11/29/2018 Chemotherapy    OP OVARIAN PACLitaxel / CARBOplatin AUC=6 / Bevacizumab 15 mg/kg  3 cycles adjuvant chemotherapy planned.     Patient chose to decline Avastin with all adjuvant cycles.     10/19/2018 Genetic Testing    Counseling and testing completed via CancerNext panel. 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 Patient does not have disease, but is a carrier.     1/3/2019 Imaging    Post-treatment CT chest, abdomen, and pelvis. No ascites, nodularity, or residual disease seen. No evidence of progression or active malignancy.      3/21/2019 Imaging    CT chest, abdomen, pelvis for diffuse abdominal pain. Study negative for recurrent disease     12/29/2020 Imaging    CT chest, abdomen, pelvis prior to port removal:  Stable examination with no CT evidence of acute intrathoracic, intra-abdominal or pelvic abnormality. No evidence of progression or metastatic disease.     9/10/2021 Imaging    CT chest, abdomen, pelvis:  Stable appearance of the chest abdomen and pelvis without acute pathology or evidence for occurrence or active metastatic disease     6/22/2022 Imaging    CT chest, abdomen, pelvis:  No evidence of metastatic disease within the abdomen or pelvis. Slowly enlarging bilateral pericardial fat pad lymph notes. Findings  are nonspecific given the slow progression since 2020, but are  concerning for metastatic disease or  lymphoma.     8/22/2022 Progression    CT biopsy left pericardial lymph node. 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     9/20/2022 Molecular Testing    CARIS results:    PD-L1 positive  ER positive 2+, 75%  MSI stable, MMR proficient, MEGAN low, TMB low  PAMELA, BRCA 1/2, RAD51 C/D all negative  TN negative  Her2/Kike negative     12/13/2022 - 1/25/2023 Chemotherapy    OP OVARIAN PACLitaxel / CARBOplatin (Q21D)     2/22/2023 - 4/5/2023 Chemotherapy    OP OVARIAN PACLitaxel / CARBOplatin AUC=6 / Bevacizumab 15 mg/kg     4/26/2023 -  Chemotherapy    OP OVARIAN Bevacizumab 15 mg/kg (Maintenance)           Past Medical History:   Diagnosis Date   • Hypertension    • Ovarian cancer 2018    chemotherapy    • Wears glasses        Past Surgical History:   Procedure Laterality Date   • APPENDECTOMY      possibly with tubal (not certain)   • BREAST BIOPSY Bilateral 1972    cysts removed    • COLONOSCOPY     • EXPLORATORY LAPAROTOMY, TOTAL ABDOMINAL HYSTERECTOMY SALPINGO OOPHORECTOMY N/A 09/11/2018    Procedure: LAPAROTOMY EXPLORATORY, TOTAL ABDOMINAL HYSTERECTOMY, BILATERAL SALPINGO OOPHORECTOMY, DEBULKING;  Surgeon: Shannan Bess MD;  [facility];  Service: Gynecology   • HERNIA REPAIR Right     inguinal    • SUBLINGUAL SALIVARY CYST EXCISION Right 2020    at [facility]   • TUBAL ABDOMINAL LIGATION         MEDICATIONS: The current medication list was reviewed with the patient and updated in the EMR this date per the Medical Assistant. Medication dosages and frequencies were confirmed to be accurate.      Allergies:  has No Known Allergies.    Social History:   Social History     Socioeconomic History   • Marital status:    Tobacco Use   • Smoking status: Never   • Smokeless tobacco: Never   Vaping Use   • Vaping Use: Never used   Substance and Sexual Activity   • Alcohol use: No   • Drug use: No   • Sexual activity: Defer       Family History:    Family History   Adopted: Yes   Family history  unknown: Yes       Health Maintenance:    Health Maintenance   Topic Date Due   • Pneumococcal Vaccine 65+ (1 - PCV) Never done   • TDAP/TD VACCINES (1 - Tdap) Never done   • ZOSTER VACCINE (1 of 2) Never done   • HEPATITIS C SCREENING  Never done   • ANNUAL WELLNESS VISIT  Never done   • LIPID PANEL  11/03/2021   • COVID-19 Vaccine (4 - Booster for Pfizer series) 11/22/2021   • DXA SCAN  03/22/2023   • INFLUENZA VACCINE  08/01/2023   • MAMMOGRAM  01/27/2024   • COLORECTAL CANCER SCREENING  04/24/2028       Review of Systems  Please refer to history of present illness, review of systems otherwise negative.    There were no vitals filed for this visit.    There is no height or weight on file to calculate BMI.  Wt Readings from Last 3 Encounters:   04/05/23 49.2 kg (108 lb 6.4 oz)   03/15/23 51.2 kg (112 lb 12.8 oz)   01/25/23 49.4 kg (108 lb 12.8 oz)       GENERAL: Alert, well-appearing female appearing her stated age who is in no apparent distress.   HEENT: Sclera anicteric. Head normocephalic, atraumatic.  Wearing mask.  NECK: No thyromegaly, supple  BREASTS: Deferred  CARDIOVASCULAR: Normal rate, regular rhythm.  No murmurs, rubs, gallops.  No peripheral edema.  RESPIRATORY: Lungs clear to auscultation bilaterally, normal respiratory effort on room air  BACK: Deferred  GASTROINTESTINAL: No tenderness, no distinct mass, no distention  SKIN:  Warm, dry, well-perfused.  All visible areas intact. Port in left side of chest accessed without issue  PSYCHIATRIC: AO x3, with appropriate affect, normal thought processes.  Mood and affect appropriate.  NEUROLOGIC: No focal deficits.  Moves extremities well.  MUSCULOSKELETAL: Normal gait and station.   EXTREMITIES:   No cyanosis, clubbing, symmetric.  LYMPHATICS: No cervical adenopathy     PELVIC exam:    Deferred    ECOG PS 0    PROCEDURES: None    Diagnostic Data:     No radiology results for the last 30 days.    Lab Results   Component Value Date    WBC 4.21 04/05/2023     HGB 13.3 04/05/2023    HCT 39.4 04/05/2023    MCV 94.3 04/05/2023     04/05/2023    NEUTROABS 2.09 04/05/2023    GLUCOSE 104 (H) 04/05/2023    BUN 17 04/05/2023    CREATININE 0.87 04/05/2023    EGFRIFNONA 70 11/29/2018     04/05/2023    K 4.0 04/05/2023    CL 98 04/05/2023    CO2 25.0 04/05/2023    MG 2.1 06/20/2018    PHOS 4.6 06/20/2018    CALCIUM 9.5 04/05/2023    ALBUMIN 3.9 04/05/2023    AST 20 04/05/2023    ALT 13 04/05/2023    BILITOT 0.2 04/05/2023     Lab Results   Component Value Date     17.5 04/05/2023     14.3 03/15/2023     17.8 02/22/2023     21.8 01/25/2023     26.9 01/04/2023     46.9 (H) 12/12/2022     42.4 (H) 11/02/2022     40.5 (H) 09/28/2022     33.2 05/17/2022     25.7 11/17/2021         Assessment & Plan   This is a 72 y.o. 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    Encounter Diagnosis   Name Primary?   • Ovarian cancer, bilateral Yes      Recurrent Ovarian Cancer  -initial diagnosis in 2018  -first line treatment included 3 cycles neoadjuvant carbo/taxol, interval optimal debulking, and 3 cycles adjuvant carbo/taxol  -recurrence found in 6/2022 in thoracic lymph nodes, biopsy proven in 8/2022  -s/p 6 cycles of carboplatin/paclitaxel/bevacizumab.     -continue bevacizumab as a maintenance drug  -CT scans reviewed with patient and .  On my interpretation, there is been decreased in the pericardial lesion and some nonspecific lung changes as well.  Await final read.    Chemotherapy-induced neuropathy  -mild at baseline since chemotherapy in 2018  -stable     Hypertension  -started bevacizumab in Feb. 2023  -On lisinopril  -Hypertension today, improved on recheck.  Low 140s at infusion center, okay to treat.    Proteinuria  -24-hour urine below threshold, okay to treat    Routine Health Maintenance Screening  - Counseled that mammogram and dental cleanings can be performed without additional  precautions  - Patient due for colonoscopy. Counseled that although this is a minor procedure timing is important and should be performed a few days prior to beginning next bevacizumab.  Typically, bevacizumab was held for weeks before and after major surgical procedures.  I usually hold bevacizumab for 2 weeks after Port-A-Cath placement because its vascular procedure.     - Patient sees Dr. Salazar. Instructed to check with Dr. Salazar about if bevacizumab needs to be held for specified amount of time before and after colonoscopy.     Orders Placed This Encounter   Procedures   • Comprehensive metabolic panel     Standing Status:   Future     Standing Expiration Date:   4/26/2024     Order Specific Question:   Release to patient     Answer:   Routine Release   • Urinalysis without microscopic (no culture) - Urine, Clean Catch     Standing Status:   Future     Standing Expiration Date:   4/26/2024     Order Specific Question:   Release to patient     Answer:   Routine Release   •      Standing Status:   Future     Standing Expiration Date:   4/26/2024     Order Specific Question:   Release to patient     Answer:   Routine Release   • CBC and Differential     Standing Status:   Future     Standing Expiration Date:   4/26/2024     Order Specific Question:   Manual Differential     Answer:   No     Order Specific Question:   Release to patient     Answer:   Routine Release     FOLLOW UP: 3 weeks  I spent 30 minutes caring for Lyndsey on this date of service. This time includes time spent by me in the following activities: preparing for the visit, reviewing tests, performing a medically appropriate examination and/or evaluation, counseling and educating the patient/family/caregiver, ordering medications, tests, or procedures, referring and communicating with other health care professionals, documenting information in the medical record and independently interpreting results and communicating that information with  the patient/family/caregiver    Patient was seen and examined with Dr. Vinson,  resident, who performed portions of the examination and documentation for this patient's care under my direct supervision.  I agree with the above documentation and plan.    Shannan Bess MD  04/26/23  16:09 EDT

## 2023-04-26 ENCOUNTER — OFFICE VISIT (OUTPATIENT)
Dept: GYNECOLOGIC ONCOLOGY | Facility: CLINIC | Age: 73
End: 2023-04-26
Payer: MEDICARE

## 2023-04-26 ENCOUNTER — APPOINTMENT (OUTPATIENT)
Dept: ONCOLOGY | Facility: HOSPITAL | Age: 73
End: 2023-04-26
Payer: MEDICARE

## 2023-04-26 ENCOUNTER — HOSPITAL ENCOUNTER (OUTPATIENT)
Dept: CT IMAGING | Facility: HOSPITAL | Age: 73
Discharge: HOME | End: 2023-04-26
Payer: MEDICARE

## 2023-04-26 ENCOUNTER — HOSPITAL ENCOUNTER (OUTPATIENT)
Dept: ONCOLOGY | Facility: HOSPITAL | Age: 73
Discharge: HOME | End: 2023-04-26
Payer: MEDICARE

## 2023-04-26 VITALS
BODY MASS INDEX: 22.74 KG/M2 | DIASTOLIC BLOOD PRESSURE: 80 MMHG | RESPIRATION RATE: 18 BRPM | WEIGHT: 112.8 LBS | SYSTOLIC BLOOD PRESSURE: 142 MMHG | OXYGEN SATURATION: 97 % | HEIGHT: 59 IN | TEMPERATURE: 97.1 F | HEART RATE: 79 BPM

## 2023-04-26 DIAGNOSIS — C56.3 OVARIAN CANCER, BILATERAL: Primary | ICD-10-CM

## 2023-04-26 DIAGNOSIS — C56.3 OVARIAN CANCER, BILATERAL: ICD-10-CM

## 2023-04-26 LAB
ALBUMIN SERPL-MCNC: 4.1 G/DL (ref 3.5–5.2)
ALBUMIN/GLOB SERPL: 1.5 G/DL
ALP SERPL-CCNC: 21 U/L (ref 39–117)
ALT SERPL W P-5'-P-CCNC: 15 U/L (ref 1–33)
ANION GAP SERPL CALCULATED.3IONS-SCNC: 11 MMOL/L (ref 5–15)
AST SERPL-CCNC: 25 U/L (ref 1–32)
BASOPHILS # BLD AUTO: 0.01 10*3/MM3 (ref 0–0.2)
BASOPHILS NFR BLD AUTO: 0.2 % (ref 0–1.5)
BILIRUB SERPL-MCNC: 0.2 MG/DL (ref 0–1.2)
BILIRUB UR QL STRIP: NEGATIVE
BUN SERPL-MCNC: 14 MG/DL (ref 8–23)
BUN/CREAT SERPL: 17.7 (ref 7–25)
CALCIUM SPEC-SCNC: 9.3 MG/DL (ref 8.6–10.5)
CANCER AG125 SERPL QL: 16.1 U/ML (ref 0–38.1)
CHLORIDE SERPL-SCNC: 99 MMOL/L (ref 98–107)
CLARITY UR: CLEAR
CO2 SERPL-SCNC: 25 MMOL/L (ref 22–29)
COLOR UR: YELLOW
CREAT SERPL-MCNC: 0.79 MG/DL (ref 0.57–1)
DEPRECATED RDW RBC AUTO: 48 FL (ref 37–54)
EGFRCR SERPLBLD CKD-EPI 2021: 79.6 ML/MIN/1.73
EOSINOPHIL # BLD AUTO: 0.03 10*3/MM3 (ref 0–0.4)
EOSINOPHIL NFR BLD AUTO: 0.6 % (ref 0.3–6.2)
ERYTHROCYTE [DISTWIDTH] IN BLOOD BY AUTOMATED COUNT: 13.5 % (ref 12.3–15.4)
GLOBULIN UR ELPH-MCNC: 2.8 GM/DL
GLUCOSE SERPL-MCNC: 81 MG/DL (ref 65–99)
GLUCOSE UR STRIP-MCNC: NEGATIVE MG/DL
HCT VFR BLD AUTO: 37.4 % (ref 34–46.6)
HGB BLD-MCNC: 12.8 G/DL (ref 12–15.9)
HGB UR QL STRIP.AUTO: NEGATIVE
IMM GRANULOCYTES # BLD AUTO: 0.01 10*3/MM3 (ref 0–0.05)
IMM GRANULOCYTES NFR BLD AUTO: 0.2 % (ref 0–0.5)
KETONES UR QL STRIP: NEGATIVE
LEUKOCYTE ESTERASE UR QL STRIP.AUTO: NEGATIVE
LYMPHOCYTES # BLD AUTO: 1.76 10*3/MM3 (ref 0.7–3.1)
LYMPHOCYTES NFR BLD AUTO: 33.1 % (ref 19.6–45.3)
MCH RBC QN AUTO: 32.9 PG (ref 26.6–33)
MCHC RBC AUTO-ENTMCNC: 34.2 G/DL (ref 31.5–35.7)
MCV RBC AUTO: 96.1 FL (ref 79–97)
MONOCYTES # BLD AUTO: 0.83 10*3/MM3 (ref 0.1–0.9)
MONOCYTES NFR BLD AUTO: 15.6 % (ref 5–12)
NEUTROPHILS NFR BLD AUTO: 2.68 10*3/MM3 (ref 1.7–7)
NEUTROPHILS NFR BLD AUTO: 50.3 % (ref 42.7–76)
NITRITE UR QL STRIP: NEGATIVE
PH UR STRIP.AUTO: 6 [PH] (ref 5–8)
PLATELET # BLD AUTO: 219 10*3/MM3 (ref 140–450)
PMV BLD AUTO: 9.2 FL (ref 6–12)
POTASSIUM SERPL-SCNC: 4.6 MMOL/L (ref 3.5–5.2)
PROT SERPL-MCNC: 6.9 G/DL (ref 6–8.5)
PROT UR QL STRIP: NEGATIVE
RBC # BLD AUTO: 3.89 10*6/MM3 (ref 3.77–5.28)
SODIUM SERPL-SCNC: 135 MMOL/L (ref 136–145)
SP GR UR STRIP: 1 (ref 1–1.03)
UROBILINOGEN UR QL STRIP: NORMAL
WBC NRBC COR # BLD: 5.32 10*3/MM3 (ref 3.4–10.8)

## 2023-04-26 PROCEDURE — 25510000001 IOPAMIDOL 61 % SOLUTION: Performed by: OBSTETRICS & GYNECOLOGY

## 2023-04-26 PROCEDURE — 25010000002 BEVACIZUMAB-BVZR 400 MG/16ML SOLUTION 16 ML VIAL: Performed by: OBSTETRICS & GYNECOLOGY

## 2023-04-26 PROCEDURE — 99214 OFFICE O/P EST MOD 30 MIN: CPT | Performed by: OBSTETRICS & GYNECOLOGY

## 2023-04-26 PROCEDURE — 85025 COMPLETE CBC W/AUTO DIFF WBC: CPT | Performed by: OBSTETRICS & GYNECOLOGY

## 2023-04-26 PROCEDURE — 81003 URINALYSIS AUTO W/O SCOPE: CPT | Performed by: OBSTETRICS & GYNECOLOGY

## 2023-04-26 PROCEDURE — 1160F RVW MEDS BY RX/DR IN RCRD: CPT | Performed by: OBSTETRICS & GYNECOLOGY

## 2023-04-26 PROCEDURE — 74177 CT ABD & PELVIS W/CONTRAST: CPT

## 2023-04-26 PROCEDURE — 1159F MED LIST DOCD IN RCRD: CPT | Performed by: OBSTETRICS & GYNECOLOGY

## 2023-04-26 PROCEDURE — 71260 CT THORAX DX C+: CPT

## 2023-04-26 PROCEDURE — 96413 CHEMO IV INFUSION 1 HR: CPT

## 2023-04-26 PROCEDURE — 25010000002 HEPARIN LOCK FLUSH PER 10 UNITS: Performed by: OBSTETRICS & GYNECOLOGY

## 2023-04-26 PROCEDURE — 80053 COMPREHEN METABOLIC PANEL: CPT | Performed by: OBSTETRICS & GYNECOLOGY

## 2023-04-26 PROCEDURE — 1126F AMNT PAIN NOTED NONE PRSNT: CPT | Performed by: OBSTETRICS & GYNECOLOGY

## 2023-04-26 PROCEDURE — 86304 IMMUNOASSAY TUMOR CA 125: CPT | Performed by: OBSTETRICS & GYNECOLOGY

## 2023-04-26 PROCEDURE — 96365 THER/PROPH/DIAG IV INF INIT: CPT

## 2023-04-26 PROCEDURE — 3077F SYST BP >= 140 MM HG: CPT | Performed by: OBSTETRICS & GYNECOLOGY

## 2023-04-26 PROCEDURE — 3079F DIAST BP 80-89 MM HG: CPT | Performed by: OBSTETRICS & GYNECOLOGY

## 2023-04-26 RX ORDER — SODIUM CHLORIDE 0.9 % (FLUSH) 0.9 %
10 SYRINGE (ML) INJECTION AS NEEDED
OUTPATIENT
Start: 2023-07-01

## 2023-04-26 RX ORDER — LISINOPRIL 20 MG/1
TABLET ORAL
COMMUNITY
Start: 2023-04-13

## 2023-04-26 RX ORDER — SODIUM CHLORIDE 9 MG/ML
250 INJECTION, SOLUTION INTRAVENOUS ONCE
Status: DISCONTINUED | OUTPATIENT
Start: 2023-04-26 | End: 2023-04-27 | Stop reason: HOSPADM

## 2023-04-26 RX ORDER — HEPARIN SODIUM (PORCINE) LOCK FLUSH IV SOLN 100 UNIT/ML 100 UNIT/ML
500 SOLUTION INTRAVENOUS AS NEEDED
OUTPATIENT
Start: 2023-07-01

## 2023-04-26 RX ORDER — SODIUM CHLORIDE 9 MG/ML
250 INJECTION, SOLUTION INTRAVENOUS ONCE
Status: CANCELLED | OUTPATIENT
Start: 2023-04-26

## 2023-04-26 RX ORDER — HYDROCHLOROTHIAZIDE 25 MG/1
1 TABLET ORAL DAILY
COMMUNITY
Start: 2023-04-14 | End: 2023-04-26

## 2023-04-26 RX ORDER — HEPARIN SODIUM (PORCINE) LOCK FLUSH IV SOLN 100 UNIT/ML 100 UNIT/ML
500 SOLUTION INTRAVENOUS AS NEEDED
Status: DISCONTINUED | OUTPATIENT
Start: 2023-04-26 | End: 2023-04-27 | Stop reason: HOSPADM

## 2023-04-26 RX ADMIN — HEPARIN 500 UNITS: 100 SYRINGE at 16:13

## 2023-04-26 RX ADMIN — IOPAMIDOL 80 ML: 612 INJECTION, SOLUTION INTRAVENOUS at 12:26

## 2023-04-26 RX ADMIN — SODIUM CHLORIDE 740 MG: 9 INJECTION, SOLUTION INTRAVENOUS at 15:36

## 2023-04-28 ENCOUNTER — TELEPHONE (OUTPATIENT)
Dept: GYNECOLOGIC ONCOLOGY | Facility: CLINIC | Age: 73
End: 2023-04-28
Payer: MEDICARE

## 2023-04-28 NOTE — TELEPHONE ENCOUNTER
----- Message from Shannan Bess MD sent at 4/28/2023  4:07 PM EDT -----  Please notify patient that CT scan shows decrease in disease as we had discussed at her visit.  Thank you      ----- Message -----  From: Interface, Rad Results [place] In  Sent: 4/28/2023  10:11 AM EDT  To: Shannan Bess MD

## 2023-04-28 NOTE — TELEPHONE ENCOUNTER
Patient notified of Providers comments for Scan results with verbalized understanding and thank you.

## 2023-05-12 DIAGNOSIS — C56.3 OVARIAN CANCER, BILATERAL: Primary | ICD-10-CM

## 2023-05-12 RX ORDER — SODIUM CHLORIDE 9 MG/ML
250 INJECTION, SOLUTION INTRAVENOUS ONCE
OUTPATIENT
Start: 2023-05-17

## 2023-05-16 NOTE — PROGRESS NOTES
Lyndsey CHRISTIE [facility]  [id number]  1950    Reason for visit: History of ovarian cancer now with biopsy-proven lymph node recurrence in the chest, cycle 2 of bevacizumab    History of present illness:  The patient is a 72 y.o. year old female who presents today for treatment and evaluation of the above issues.    She is 4 years out from completion of treatment. Last  26.9 on 1/4. Had CT scan on 06/2022 which showed slowly enlarging bilateral pericardial fat pad lymph notes. 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 She is s/p 6 cycles of Carboplatin/Paclitaxel/Bevacizumab.     She presents today for cycle 2 of bevacizumab as a maintenance drug.      Today, she notes normal appetite.  She notes good energy and is looking forward to ongoing graduation activities this weekend.  She is accompanied by her  as usual.  She has URI symptoms typical for bevacizumab side effects without any severe side effects noted. Now on lisinopril for blood pressure.      Oncologic History:  Oncology/Hematology History   Ovarian cancer, bilateral   6/22/2018 Initial Diagnosis    Abnormal examination by GI for patient c/o bloating and diarrhea, sent to GYN. TVUS concerning for malignancy. CT showed multiple cystic masses on bilateral ovaries, possible omental implants, and large volume ascites. Paracentesis performed and cytology returned positive for metastatic adenocarcinoma, favor GYN origin. CA-125 at diagnosis = 907.7. Referred to Gyn Oncology.      6/28/2018 Procedure    Port-a-cath placement     7/6/2018 - 8/16/2018 Chemotherapy    OP OVARIAN PACLitaxel / CARBOplatin (Q21D)  Neoadjuvant x 3 cycles     9/6/2018 Imaging    Interval CT showed improvement in ascites and resolution of previous left pleural effusion     9/11/2018 Surgery    Exploratory laparotomy, EMMA/BSO, debulking to R=0, and partial rectal resection.  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 Primary tumor site thought to be left fallopian tube. Bilateral ovarian, tubal, and surface involvement. Omentum, pelvic peritoneum, colon serosa, and rectal serosa involved. Macroscopic extrapelvic peritoneal implants also found. Stage IIIB grade 3         10/3/2018 - 11/29/2018 Chemotherapy    OP OVARIAN PACLitaxel / CARBOplatin AUC=6 / Bevacizumab 15 mg/kg  3 cycles adjuvant chemotherapy planned.     Patient chose to decline Avastin with all adjuvant cycles.     10/19/2018 Genetic Testing    Counseling and testing completed via CancerNext panel. 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 Patient does not have disease, but is a carrier.     1/3/2019 Imaging    Post-treatment CT chest, abdomen, and pelvis. No ascites, nodularity, or residual disease seen. No evidence of progression or active malignancy.      3/21/2019 Imaging    CT chest, abdomen, pelvis for diffuse abdominal pain. Study negative for recurrent disease     12/29/2020 Imaging    CT chest, abdomen, pelvis prior to port removal:  Stable examination with no CT evidence of acute intrathoracic, intra-abdominal or pelvic abnormality. No evidence of progression or metastatic disease.     9/10/2021 Imaging    CT chest, abdomen, pelvis:  Stable appearance of the chest abdomen and pelvis without acute pathology or evidence for occurrence or active metastatic disease     6/22/2022 Imaging    CT chest, abdomen, pelvis:  No evidence of metastatic disease within the abdomen or pelvis. Slowly enlarging bilateral pericardial fat pad lymph notes. Findings  are nonspecific given the slow progression since 2020, but are  concerning for metastatic disease or lymphoma.     8/22/2022 Progression    CT biopsy left pericardial lymph node. 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     9/20/2022  Molecular Testing    CARIS results:    PD-L1 positive  ER positive 2+, 75%  MSI stable, MMR proficient, MEGAN low, TMB low  PAMELA, BRCA 1/2, RAD51 C/D all negative  KS negative  Her2/Kike negative     12/13/2022 - 1/25/2023 Chemotherapy    OP OVARIAN PACLitaxel / CARBOplatin (Q21D)     2/22/2023 - 4/5/2023 Chemotherapy    OP OVARIAN PACLitaxel / CARBOplatin AUC=6 / Bevacizumab 15 mg/kg     4/26/2023 Imaging    CT C/A/P  Impression:  1.Decreased size of masses in the pericardial fat.  2.Decreased size of previously noted enlarged right external iliac lymph node.     4/26/2023 -  Chemotherapy    OP OVARIAN Bevacizumab 15 mg/kg (Maintenance)             Past Medical History:   Diagnosis Date   • Hypertension    • Ovarian cancer 2018    chemotherapy    • Wears glasses        Past Surgical History:   Procedure Laterality Date   • APPENDECTOMY      possibly with tubal (not certain)   • BREAST BIOPSY Bilateral 1972    cysts removed    • COLONOSCOPY     • EXPLORATORY LAPAROTOMY, TOTAL ABDOMINAL HYSTERECTOMY SALPINGO OOPHORECTOMY N/A 09/11/2018    Procedure: LAPAROTOMY EXPLORATORY, TOTAL ABDOMINAL HYSTERECTOMY, BILATERAL SALPINGO OOPHORECTOMY, DEBULKING;  Surgeon: Shannan Bess MD;  Location: [facility];  Service: Gynecology   • HERNIA REPAIR Right     inguinal    • SUBLINGUAL SALIVARY CYST EXCISION Right 2020    at [facility]   • TUBAL ABDOMINAL LIGATION         MEDICATIONS: The current medication list was reviewed with the patient and updated in the EMR this date per the Medical Assistant. Medication dosages and frequencies were confirmed to be accurate.      Allergies:  has No Known Allergies.    Social History:   Social History     Socioeconomic History   • Marital status:    Tobacco Use   • Smoking status: Never   • Smokeless tobacco: Never   Vaping Use   • Vaping Use: Never used   Substance and Sexual Activity   • Alcohol use: No   • Drug use: No   • Sexual activity: Defer       Family History:    Family History  "  Adopted: Yes   Family history unknown: Yes       Health Maintenance:    Health Maintenance   Topic Date Due   • Pneumococcal Vaccine 65+ (1 - PCV) Never done   • TDAP/TD VACCINES (1 - Tdap) Never done   • HEPATITIS C SCREENING  Never done   • ANNUAL WELLNESS VISIT  Never done   • LIPID PANEL  11/03/2021   • ZOSTER VACCINE (2 of 2) 11/29/2022   • DXA SCAN  03/22/2023   • INFLUENZA VACCINE  08/01/2023   • MAMMOGRAM  01/27/2024   • COLORECTAL CANCER SCREENING  04/24/2028   • COVID-19 Vaccine  Completed       Review of Systems  Please refer to history of present illness, review of systems otherwise negative.    Vitals:    05/17/23 1027   BP: 155/73   Pulse: 74   Resp: 18   Temp: 97.7 °F (36.5 °C)   TempSrc: Temporal   SpO2: 99%   Weight: 49.5 kg (109 lb 1.6 oz)   Height: 149.9 cm (59\")   PainSc: 0-No pain       Body mass index is 22.04 kg/m².  Wt Readings from Last 3 Encounters:   05/17/23 49.5 kg (109 lb 1.6 oz)   04/26/23 51.2 kg (112 lb 12.8 oz)   04/05/23 49.2 kg (108 lb 6.4 oz)       GENERAL: Alert, well-appearing female appearing her stated age who is in no apparent distress.   HEENT: Sclera anicteric. Head normocephalic, atraumatic.  Wearing mask.  NECK: No thyromegaly, supple  BREASTS: Deferred  CARDIOVASCULAR: Normal rate, regular rhythm.  No murmurs, rubs, gallops.  No peripheral edema.  RESPIRATORY: Lungs clear to auscultation bilaterally, normal respiratory effort on room air  BACK: Deferred  GASTROINTESTINAL: No tenderness, no distinct mass, no distention  SKIN:  Warm, dry, well-perfused.  All visible areas intact. Port in left side of chest accessed without issue  PSYCHIATRIC: AO x3, with appropriate affect, normal thought processes.  Mood and affect appropriate.  NEUROLOGIC: No focal deficits.  Moves extremities well.  MUSCULOSKELETAL: Normal gait and station.   EXTREMITIES:   No cyanosis, clubbing, symmetric.  LYMPHATICS: No cervical adenopathy     PELVIC exam:    Deferred    ECOG PS 0    PROCEDURES: " None    Diagnostic Data:     CT Chest With Contrast Diagnostic    Result Date: 4/28/2023  Impression: 1.Decreased size of masses in the pericardial fat. 2.Decreased size of previously noted enlarged right external iliac lymph node. Electronically Signed: Germania Persaud  4/28/2023 10:07 AM EDT  Workstation ID: [id number]    CT Abdomen Pelvis With Contrast    Result Date: 4/28/2023  Impression: 1.Decreased size of masses in the pericardial fat. 2.Decreased size of previously noted enlarged right external iliac lymph node. Electronically Signed: Germania Harkinsley  4/28/2023 10:07 AM EDT  Workstation ID: [id number]      Lab Results   Component Value Date    WBC 4.62 05/17/2023    HGB 12.7 05/17/2023    HCT 38.3 05/17/2023    MCV 99.5 (H) 05/17/2023     05/17/2023    NEUTROABS 2.67 05/17/2023    GLUCOSE 121 (H) 05/17/2023    BUN 15 05/17/2023    CREATININE 0.97 05/17/2023    EGFRIFNONA 70 11/29/2018     (L) 05/17/2023    K 4.4 05/17/2023    CL 98 05/17/2023    CO2 28.0 05/17/2023    MG 2.1 06/20/2018    PHOS 4.6 06/20/2018    CALCIUM 9.5 05/17/2023    ALBUMIN 4.1 05/17/2023    AST 27 05/17/2023    ALT 12 05/17/2023    BILITOT 0.3 05/17/2023     Lab Results   Component Value Date     15.8 05/17/2023     16.1 04/26/2023     17.5 04/05/2023     14.3 03/15/2023     17.8 02/22/2023     21.8 01/25/2023     26.9 01/04/2023     46.9 (H) 12/12/2022     42.4 (H) 11/02/2022     40.5 (H) 09/28/2022         Assessment & Plan   This is a 72 y.o. 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    Encounter Diagnosis   Name Primary?   • Ovarian cancer, bilateral Yes      Recurrent Ovarian Cancer, see detailed cancer history above.  -continue bevacizumab as a maintenance drug  -Most recent CT scans 4/26 reviewed with patient and  at last visit. On final read, decreased size of masses in pericardial fat and decreased enlarged right external iliac lymph  node.  We will consider repeat scans June or July 2023.    Chemotherapy-induced neuropathy  -mild at baseline since chemotherapy in 2018  -stable     Hypertension  -started bevacizumab in Feb. 2023  -On lisinopril    Proteinuria  -24-hour urine below threshold, okay to treat    Routine Health Maintenance Screening  - Counseled that mammogram and dental cleanings can be performed without additional precautions  - Patient due for colonoscopy. Counseled that although this is a minor procedure timing is important and should be performed a few days prior to beginning next bevacizumab.  Typically, bevacizumab was held for weeks before and after major surgical procedures.  I usually hold bevacizumab for 2 weeks after Port-A-Cath placement because its vascular procedure.     - Patient sees Dr. Salazar. Instructed to check with Dr. Salazar about if bevacizumab needs to be held for specified amount of time before and after colonoscopy.     No orders of the defined types were placed in this encounter.    FOLLOW UP: 3 weeks  I spent 25 minutes caring for Lyndsey on this date of service. This time includes time spent by me in the following activities: preparing for the visit, reviewing tests, performing a medically appropriate examination and/or evaluation, counseling and educating the patient/family/caregiver, ordering medications, tests, or procedures and documenting information in the medical record    Patient was seen and examined with Dr. Vinson,  resident, who performed portions of the examination and documentation for this patient's care under my direct supervision.  I agree with the above documentation and plan.    Shannan Bess MD  05/17/23  15:22 EDT

## 2023-05-17 ENCOUNTER — HOSPITAL ENCOUNTER (OUTPATIENT)
Dept: ONCOLOGY | Facility: HOSPITAL | Age: 73
Discharge: HOME | End: 2023-05-17
Admitting: OBSTETRICS & GYNECOLOGY
Payer: MEDICARE

## 2023-05-17 ENCOUNTER — APPOINTMENT (OUTPATIENT)
Dept: ONCOLOGY | Facility: HOSPITAL | Age: 73
End: 2023-05-17
Payer: MEDICARE

## 2023-05-17 ENCOUNTER — OFFICE VISIT (OUTPATIENT)
Dept: GYNECOLOGIC ONCOLOGY | Facility: CLINIC | Age: 73
End: 2023-05-17
Payer: MEDICARE

## 2023-05-17 VITALS — DIASTOLIC BLOOD PRESSURE: 71 MMHG | SYSTOLIC BLOOD PRESSURE: 137 MMHG | HEART RATE: 72 BPM

## 2023-05-17 VITALS
RESPIRATION RATE: 18 BRPM | WEIGHT: 109.1 LBS | BODY MASS INDEX: 22 KG/M2 | SYSTOLIC BLOOD PRESSURE: 155 MMHG | DIASTOLIC BLOOD PRESSURE: 73 MMHG | HEART RATE: 74 BPM | HEIGHT: 59 IN | OXYGEN SATURATION: 99 % | TEMPERATURE: 97.7 F

## 2023-05-17 DIAGNOSIS — C56.3 OVARIAN CANCER, BILATERAL: Primary | ICD-10-CM

## 2023-05-17 LAB
ALBUMIN SERPL-MCNC: 4.1 G/DL (ref 3.5–5.2)
ALBUMIN/GLOB SERPL: 1.4 G/DL
ALP SERPL-CCNC: 20 U/L (ref 39–117)
ALT SERPL W P-5'-P-CCNC: 12 U/L (ref 1–33)
ANION GAP SERPL CALCULATED.3IONS-SCNC: 9 MMOL/L (ref 5–15)
AST SERPL-CCNC: 27 U/L (ref 1–32)
BASOPHILS # BLD AUTO: 0.01 10*3/MM3 (ref 0–0.2)
BASOPHILS NFR BLD AUTO: 0.2 % (ref 0–1.5)
BILIRUB SERPL-MCNC: 0.3 MG/DL (ref 0–1.2)
BILIRUB UR QL STRIP: NEGATIVE
BUN SERPL-MCNC: 15 MG/DL (ref 8–23)
BUN/CREAT SERPL: 15.5 (ref 7–25)
CALCIUM SPEC-SCNC: 9.5 MG/DL (ref 8.6–10.5)
CANCER AG125 SERPL QL: 15.8 U/ML (ref 0–38.1)
CHLORIDE SERPL-SCNC: 98 MMOL/L (ref 98–107)
CLARITY UR: CLEAR
CO2 SERPL-SCNC: 28 MMOL/L (ref 22–29)
COLOR UR: YELLOW
CREAT SERPL-MCNC: 0.97 MG/DL (ref 0.57–1)
DEPRECATED RDW RBC AUTO: 48.7 FL (ref 37–54)
EGFRCR SERPLBLD CKD-EPI 2021: 62.2 ML/MIN/1.73
EOSINOPHIL # BLD AUTO: 0.19 10*3/MM3 (ref 0–0.4)
EOSINOPHIL NFR BLD AUTO: 4.1 % (ref 0.3–6.2)
ERYTHROCYTE [DISTWIDTH] IN BLOOD BY AUTOMATED COUNT: 13.2 % (ref 12.3–15.4)
GLOBULIN UR ELPH-MCNC: 2.9 GM/DL
GLUCOSE SERPL-MCNC: 121 MG/DL (ref 65–99)
GLUCOSE UR STRIP-MCNC: NEGATIVE MG/DL
HCT VFR BLD AUTO: 38.3 % (ref 34–46.6)
HGB BLD-MCNC: 12.7 G/DL (ref 12–15.9)
HGB UR QL STRIP.AUTO: NEGATIVE
IMM GRANULOCYTES # BLD AUTO: 0 10*3/MM3 (ref 0–0.05)
IMM GRANULOCYTES NFR BLD AUTO: 0 % (ref 0–0.5)
KETONES UR QL STRIP: NEGATIVE
LEUKOCYTE ESTERASE UR QL STRIP.AUTO: ABNORMAL
LYMPHOCYTES # BLD AUTO: 1.26 10*3/MM3 (ref 0.7–3.1)
LYMPHOCYTES NFR BLD AUTO: 27.3 % (ref 19.6–45.3)
MCH RBC QN AUTO: 33 PG (ref 26.6–33)
MCHC RBC AUTO-ENTMCNC: 33.2 G/DL (ref 31.5–35.7)
MCV RBC AUTO: 99.5 FL (ref 79–97)
MONOCYTES # BLD AUTO: 0.49 10*3/MM3 (ref 0.1–0.9)
MONOCYTES NFR BLD AUTO: 10.6 % (ref 5–12)
NEUTROPHILS NFR BLD AUTO: 2.67 10*3/MM3 (ref 1.7–7)
NEUTROPHILS NFR BLD AUTO: 57.8 % (ref 42.7–76)
NITRITE UR QL STRIP: NEGATIVE
PH UR STRIP.AUTO: 6 [PH] (ref 5–8)
PLATELET # BLD AUTO: 265 10*3/MM3 (ref 140–450)
PMV BLD AUTO: 9.6 FL (ref 6–12)
POTASSIUM SERPL-SCNC: 4.4 MMOL/L (ref 3.5–5.2)
PROT SERPL-MCNC: 7 G/DL (ref 6–8.5)
PROT UR QL STRIP: NEGATIVE
RBC # BLD AUTO: 3.85 10*6/MM3 (ref 3.77–5.28)
SODIUM SERPL-SCNC: 135 MMOL/L (ref 136–145)
SP GR UR STRIP: 1.01 (ref 1–1.03)
UROBILINOGEN UR QL STRIP: ABNORMAL
WBC NRBC COR # BLD: 4.62 10*3/MM3 (ref 3.4–10.8)

## 2023-05-17 PROCEDURE — 96413 CHEMO IV INFUSION 1 HR: CPT

## 2023-05-17 PROCEDURE — 85025 COMPLETE CBC W/AUTO DIFF WBC: CPT | Performed by: OBSTETRICS & GYNECOLOGY

## 2023-05-17 PROCEDURE — 25010000002 HEPARIN LOCK FLUSH PER 10 UNITS: Performed by: OBSTETRICS & GYNECOLOGY

## 2023-05-17 PROCEDURE — 99213 OFFICE O/P EST LOW 20 MIN: CPT | Performed by: OBSTETRICS & GYNECOLOGY

## 2023-05-17 PROCEDURE — 25010000002 BEVACIZUMAB-BVZR 400 MG/16ML SOLUTION 16 ML VIAL: Performed by: OBSTETRICS & GYNECOLOGY

## 2023-05-17 PROCEDURE — 1159F MED LIST DOCD IN RCRD: CPT | Performed by: OBSTETRICS & GYNECOLOGY

## 2023-05-17 PROCEDURE — 3078F DIAST BP <80 MM HG: CPT | Performed by: OBSTETRICS & GYNECOLOGY

## 2023-05-17 PROCEDURE — 3077F SYST BP >= 140 MM HG: CPT | Performed by: OBSTETRICS & GYNECOLOGY

## 2023-05-17 PROCEDURE — 86304 IMMUNOASSAY TUMOR CA 125: CPT | Performed by: OBSTETRICS & GYNECOLOGY

## 2023-05-17 PROCEDURE — 81003 URINALYSIS AUTO W/O SCOPE: CPT | Performed by: OBSTETRICS & GYNECOLOGY

## 2023-05-17 PROCEDURE — 1160F RVW MEDS BY RX/DR IN RCRD: CPT | Performed by: OBSTETRICS & GYNECOLOGY

## 2023-05-17 PROCEDURE — 80053 COMPREHEN METABOLIC PANEL: CPT | Performed by: OBSTETRICS & GYNECOLOGY

## 2023-05-17 PROCEDURE — 1126F AMNT PAIN NOTED NONE PRSNT: CPT | Performed by: OBSTETRICS & GYNECOLOGY

## 2023-05-17 RX ORDER — HEPARIN SODIUM (PORCINE) LOCK FLUSH IV SOLN 100 UNIT/ML 100 UNIT/ML
500 SOLUTION INTRAVENOUS AS NEEDED
OUTPATIENT
Start: 2023-07-01

## 2023-05-17 RX ORDER — SODIUM CHLORIDE 9 MG/ML
250 INJECTION, SOLUTION INTRAVENOUS ONCE
Status: DISCONTINUED | OUTPATIENT
Start: 2023-05-17 | End: 2023-05-18 | Stop reason: HOSPADM

## 2023-05-17 RX ORDER — SODIUM CHLORIDE 0.9 % (FLUSH) 0.9 %
10 SYRINGE (ML) INJECTION AS NEEDED
OUTPATIENT
Start: 2023-07-01

## 2023-05-17 RX ORDER — HEPARIN SODIUM (PORCINE) LOCK FLUSH IV SOLN 100 UNIT/ML 100 UNIT/ML
500 SOLUTION INTRAVENOUS AS NEEDED
Status: DISCONTINUED | OUTPATIENT
Start: 2023-05-17 | End: 2023-05-18 | Stop reason: HOSPADM

## 2023-05-17 RX ADMIN — SODIUM CHLORIDE 740 MG: 9 INJECTION, SOLUTION INTRAVENOUS at 12:15

## 2023-05-17 RX ADMIN — HEPARIN 500 UNITS: 100 SYRINGE at 12:51

## 2023-06-06 NOTE — PROGRESS NOTES
Lyndsey CHRISTIE [facility]  [id number]  1950    Reason for visit: History of ovarian cancer now with biopsy-proven lymph node recurrence in the chest, cycle 2 of bevacizumab    History of present illness:  The patient is a 72 y.o. year old female who presents today for treatment and evaluation of the above issues.    She is 4 years out from completion of treatment. Last  26.9 on 1/4. Had CT scan on 06/2022 which showed slowly enlarging bilateral pericardial fat pad lymph notes. 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 She is s/p 6 cycles of Carboplatin/Paclitaxel/Bevacizumab.     She presents today for cycle 3 of bevacizumab as a maintenance drug.      Today, she is feeling well and notes normal appetite.  She notes normal energy, going for walks daily.  She has continued URI symptoms typical for bevacizumab side effects without any severe side effects noted. Now on lisinopril for blood pressure, BP normal today.      Noted some cramping pain in alternating legs while in bed at night. Electrolytes pending. Discussed stretching/yoga as adjunct which could also help with neuropathy.    Oncologic History:  Oncology/Hematology History   Ovarian cancer, bilateral   6/22/2018 Initial Diagnosis    Abnormal examination by GI for patient c/o bloating and diarrhea, sent to GYN. TVUS concerning for malignancy. CT showed multiple cystic masses on bilateral ovaries, possible omental implants, and large volume ascites. Paracentesis performed and cytology returned positive for metastatic adenocarcinoma, favor GYN origin. CA-125 at diagnosis = 907.7. Referred to Gyn Oncology.      6/28/2018 Procedure    Port-a-cath placement     7/6/2018 - 8/16/2018 Chemotherapy    OP OVARIAN PACLitaxel / CARBOplatin (Q21D)  Neoadjuvant x 3 cycles     9/6/2018 Imaging    Interval CT showed improvement in ascites and resolution of previous  left pleural effusion     9/11/2018 Surgery    Exploratory laparotomy, EMMA/BSO, debulking to R=0, and partial rectal resection. 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 Primary tumor site thought to be left fallopian tube. Bilateral ovarian, tubal, and surface involvement. Omentum, pelvic peritoneum, colon serosa, and rectal serosa involved. Macroscopic extrapelvic peritoneal implants also found. Stage IIIB grade 3         10/3/2018 - 11/29/2018 Chemotherapy    OP OVARIAN PACLitaxel / CARBOplatin AUC=6 / Bevacizumab 15 mg/kg  3 cycles adjuvant chemotherapy planned.     Patient chose to decline Avastin with all adjuvant cycles.     10/19/2018 Genetic Testing    Counseling and testing completed via CancerNext panel. 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 Patient does not have disease, but is a carrier.     1/3/2019 Imaging    Post-treatment CT chest, abdomen, and pelvis. No ascites, nodularity, or residual disease seen. No evidence of progression or active malignancy.      3/21/2019 Imaging    CT chest, abdomen, pelvis for diffuse abdominal pain. Study negative for recurrent disease     12/29/2020 Imaging    CT chest, abdomen, pelvis prior to port removal:  Stable examination with no CT evidence of acute intrathoracic, intra-abdominal or pelvic abnormality. No evidence of progression or metastatic disease.     9/10/2021 Imaging    CT chest, abdomen, pelvis:  Stable appearance of the chest abdomen and pelvis without acute pathology or evidence for occurrence or active metastatic disease     6/22/2022 Imaging    CT chest, abdomen, pelvis:  No evidence of metastatic disease within the abdomen or pelvis. Slowly enlarging bilateral pericardial fat pad lymph notes. Findings  are nonspecific given the slow progression since 2020, but are  concerning for metastatic disease or lymphoma.     8/22/2022  Progression    CT biopsy left pericardial lymph node. 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     9/20/2022 Molecular Testing    CARIS results:    PD-L1 positive  ER positive 2+, 75%  MSI stable, MMR proficient, MEGAN low, TMB low  PAMELA, BRCA 1/2, RAD51 C/D all negative  KS negative  Her2/Kike negative     12/13/2022 - 1/25/2023 Chemotherapy    OP OVARIAN PACLitaxel / CARBOplatin (Q21D)     2/22/2023 - 4/5/2023 Chemotherapy    OP OVARIAN PACLitaxel / CARBOplatin AUC=6 / Bevacizumab 15 mg/kg     4/26/2023 Imaging    CT C/A/P  Impression:  1.Decreased size of masses in the pericardial fat.  2.Decreased size of previously noted enlarged right external iliac lymph node.     4/26/2023 -  Chemotherapy    OP OVARIAN Bevacizumab 15 mg/kg (Maintenance)             Past Medical History:   Diagnosis Date    Hypertension     Ovarian cancer 2018    chemotherapy     Wears glasses        Past Surgical History:   Procedure Laterality Date    APPENDECTOMY      possibly with tubal (not certain)    BREAST BIOPSY Bilateral 1972    cysts removed     COLONOSCOPY      EXPLORATORY LAPAROTOMY, TOTAL ABDOMINAL HYSTERECTOMY SALPINGO OOPHORECTOMY N/A 09/11/2018    Procedure: LAPAROTOMY EXPLORATORY, TOTAL ABDOMINAL HYSTERECTOMY, BILATERAL SALPINGO OOPHORECTOMY, DEBULKING;  Surgeon: Shannan Bess MD;  Location: [facility];  Service: Gynecology    HERNIA REPAIR Right     inguinal     SUBLINGUAL SALIVARY CYST EXCISION Right 2020    at [facility]    TUBAL ABDOMINAL LIGATION         MEDICATIONS: The current medication list was reviewed with the patient and updated in the EMR this date per the Medical Assistant. Medication dosages and frequencies were confirmed to be accurate.      Allergies:  has No Known Allergies.    Social History:   Social History     Socioeconomic History    Marital status:    Tobacco Use    Smoking status: Never    Smokeless tobacco: Never   Vaping Use    Vaping Use: Never used   Substance and Sexual  Activity    Alcohol use: No    Drug use: No    Sexual activity: Defer       Family History:    Family History   Adopted: Yes   Family history unknown: Yes       Health Maintenance:    Health Maintenance   Topic Date Due    Pneumococcal Vaccine 65+ (1 - PCV) Never done    TDAP/TD VACCINES (1 - Tdap) Never done    HEPATITIS C SCREENING  Never done    ANNUAL WELLNESS VISIT  Never done    LIPID PANEL  11/03/2021    ZOSTER VACCINE (2 of 2) 11/29/2022    DXA SCAN  03/22/2023    INFLUENZA VACCINE  08/01/2023    MAMMOGRAM  01/27/2024    COLORECTAL CANCER SCREENING  04/24/2028    COVID-19 Vaccine  Completed       Review of Systems  Please refer to history of present illness, review of systems otherwise negative.    There were no vitals filed for this visit.      There is no height or weight on file to calculate BMI.  Wt Readings from Last 3 Encounters:   05/17/23 49.5 kg (109 lb 1.6 oz)   04/26/23 51.2 kg (112 lb 12.8 oz)   04/05/23 49.2 kg (108 lb 6.4 oz)       GENERAL: Alert, well-appearing female appearing her stated age who is in no apparent distress.   HEENT: Sclera anicteric. Head normocephalic, atraumatic.    NECK: No thyromegaly, supple  BREASTS: Deferred  CARDIOVASCULAR: Normal rate, regular rhythm.  No murmurs, rubs, gallops.  No peripheral edema.  RESPIRATORY: Lungs clear to auscultation bilaterally, normal respiratory effort on room air  BACK: Deferred  GASTROINTESTINAL: No tenderness, no distinct mass, no distention  SKIN:  Warm, dry, well-perfused.  All visible areas intact. Port in left side of chest accessed without issue  PSYCHIATRIC: AO x3, with appropriate affect, normal thought processes.  Mood and affect appropriate.  NEUROLOGIC: No focal deficits.  Moves extremities well.  MUSCULOSKELETAL: Normal gait and station.   EXTREMITIES:   No cyanosis, clubbing, symmetric. Bilateral LEs nontender  LYMPHATICS: No cervical adenopathy     PELVIC exam:    Deferred    ECOG PS 0    PROCEDURES: None    Diagnostic Data:      No radiology results for the last 30 days.      Lab Results   Component Value Date    WBC 4.62 05/17/2023    HGB 12.7 05/17/2023    HCT 38.3 05/17/2023    MCV 99.5 (H) 05/17/2023     05/17/2023    NEUTROABS 2.67 05/17/2023    GLUCOSE 121 (H) 05/17/2023    BUN 15 05/17/2023    CREATININE 0.97 05/17/2023    EGFRIFNONA 70 11/29/2018     (L) 05/17/2023    K 4.4 05/17/2023    CL 98 05/17/2023    CO2 28.0 05/17/2023    MG 2.1 06/20/2018    PHOS 4.6 06/20/2018    CALCIUM 9.5 05/17/2023    ALBUMIN 4.1 05/17/2023    AST 27 05/17/2023    ALT 12 05/17/2023    BILITOT 0.3 05/17/2023     Lab Results   Component Value Date     15.8 05/17/2023     16.1 04/26/2023     17.5 04/05/2023     14.3 03/15/2023     17.8 02/22/2023     21.8 01/25/2023     26.9 01/04/2023     46.9 (H) 12/12/2022     42.4 (H) 11/02/2022     40.5 (H) 09/28/2022         Assessment & Plan   This is a 72 y.o. 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    No diagnosis found.     Recurrent Ovarian Cancer, see detailed cancer history above.  -continue bevacizumab as a maintenance drug  -Most recent CT scans 4/26 reviewed with patient and  at last visit. On final read, decreased size of masses in pericardial fat and decreased enlarged right external iliac lymph node.  We will consider repeat scans July 2023.  -cycle 3 today    Chemotherapy-induced neuropathy  -mild at baseline since chemotherapy in 2018  -stable   -Noted some cramping pain in alternating legs while in bed at night. Electrolytes pending. Discussed stretching/yoga as adjunct which could also help with neuropathy.    Hypertension  -started bevacizumab in Feb. 2023  -On lisinopril  -165/75 today; vasotec added to treatment plan today d/t hypertension.    Proteinuria  -24-hour urine below threshold, okay to treat    Routine Health Maintenance Screening  - Counseled that mammogram and dental cleanings  can be performed without additional precautions  - Patient due for colonoscopy. Counseled that although this is a minor procedure timing is important and should be performed a few days prior to beginning next bevacizumab.  Typically, bevacizumab was held for weeks before and after major surgical procedures.  I usually hold bevacizumab for 2 weeks after Port-A-Cath placement because its vascular procedure.     - Patient sees Dr. Salazar. Instructed to check with Dr. Salazar about if bevacizumab needs to be held for specified amount of time before and after colonoscopy.     No orders of the defined types were placed in this encounter.    FOLLOW UP: 3 weeks  I spent 25 minutes caring for Lyndsey on this date of service. This time includes time spent by me in the following activities: preparing for the visit, reviewing tests, performing a medically appropriate examination and/or evaluation, counseling and educating the patient/family/caregiver, ordering medications, tests, or procedures, referring and communicating with other health care professionals, and documenting information in the medical record    Patient was seen and examined with Dr. Hernandez,  resident, who performed portions of the examination and documentation for this patient's care under my direct supervision.  I agree with the above documentation and plan.    Shannan Bess MD  06/07/23  13:04 EDT

## 2023-06-07 ENCOUNTER — HOSPITAL ENCOUNTER (OUTPATIENT)
Dept: ONCOLOGY | Facility: HOSPITAL | Age: 73
Discharge: HOME | End: 2023-06-07
Admitting: OBSTETRICS & GYNECOLOGY
Payer: MEDICARE

## 2023-06-07 ENCOUNTER — OFFICE VISIT (OUTPATIENT)
Dept: GYNECOLOGIC ONCOLOGY | Facility: CLINIC | Age: 73
End: 2023-06-07
Payer: MEDICARE

## 2023-06-07 VITALS
WEIGHT: 109.2 LBS | HEART RATE: 70 BPM | SYSTOLIC BLOOD PRESSURE: 165 MMHG | BODY MASS INDEX: 22.06 KG/M2 | DIASTOLIC BLOOD PRESSURE: 75 MMHG | OXYGEN SATURATION: 99 % | TEMPERATURE: 96.8 F

## 2023-06-07 VITALS — DIASTOLIC BLOOD PRESSURE: 64 MMHG | SYSTOLIC BLOOD PRESSURE: 138 MMHG | HEART RATE: 66 BPM

## 2023-06-07 DIAGNOSIS — T50.905A HYPERTENSION DUE TO DRUG: ICD-10-CM

## 2023-06-07 DIAGNOSIS — C56.3 OVARIAN CANCER, BILATERAL: Primary | ICD-10-CM

## 2023-06-07 DIAGNOSIS — I15.8 HYPERTENSION DUE TO DRUG: Primary | ICD-10-CM

## 2023-06-07 DIAGNOSIS — I15.8 HYPERTENSION DUE TO DRUG: ICD-10-CM

## 2023-06-07 DIAGNOSIS — T50.905A HYPERTENSION DUE TO DRUG: Primary | ICD-10-CM

## 2023-06-07 DIAGNOSIS — C56.3 OVARIAN CANCER, BILATERAL: ICD-10-CM

## 2023-06-07 LAB
ALBUMIN SERPL-MCNC: 4.2 G/DL (ref 3.5–5.2)
ALBUMIN/GLOB SERPL: 1.6 G/DL
ALP SERPL-CCNC: 23 U/L (ref 39–117)
ALT SERPL W P-5'-P-CCNC: 16 U/L (ref 1–33)
ANION GAP SERPL CALCULATED.3IONS-SCNC: 10 MMOL/L (ref 5–15)
AST SERPL-CCNC: 24 U/L (ref 1–32)
BASOPHILS # BLD AUTO: 0.01 10*3/MM3 (ref 0–0.2)
BASOPHILS NFR BLD AUTO: 0.2 % (ref 0–1.5)
BILIRUB SERPL-MCNC: 0.3 MG/DL (ref 0–1.2)
BILIRUB UR QL STRIP: NEGATIVE
BUN SERPL-MCNC: 16 MG/DL (ref 8–23)
BUN/CREAT SERPL: 20 (ref 7–25)
CALCIUM SPEC-SCNC: 9.6 MG/DL (ref 8.6–10.5)
CANCER AG125 SERPL QL: 13.3 U/ML (ref 0–38.1)
CHLORIDE SERPL-SCNC: 97 MMOL/L (ref 98–107)
CLARITY UR: CLEAR
CO2 SERPL-SCNC: 27 MMOL/L (ref 22–29)
COLOR UR: YELLOW
CREAT SERPL-MCNC: 0.8 MG/DL (ref 0.57–1)
DEPRECATED RDW RBC AUTO: 46.4 FL (ref 37–54)
EGFRCR SERPLBLD CKD-EPI 2021: 78.4 ML/MIN/1.73
EOSINOPHIL # BLD AUTO: 0.22 10*3/MM3 (ref 0–0.4)
EOSINOPHIL NFR BLD AUTO: 3.5 % (ref 0.3–6.2)
ERYTHROCYTE [DISTWIDTH] IN BLOOD BY AUTOMATED COUNT: 12.9 % (ref 12.3–15.4)
GLOBULIN UR ELPH-MCNC: 2.7 GM/DL
GLUCOSE SERPL-MCNC: 94 MG/DL (ref 65–99)
GLUCOSE UR STRIP-MCNC: NEGATIVE MG/DL
HCT VFR BLD AUTO: 38.3 % (ref 34–46.6)
HGB BLD-MCNC: 13.1 G/DL (ref 12–15.9)
HGB UR QL STRIP.AUTO: NEGATIVE
IMM GRANULOCYTES # BLD AUTO: 0.01 10*3/MM3 (ref 0–0.05)
IMM GRANULOCYTES NFR BLD AUTO: 0.2 % (ref 0–0.5)
KETONES UR QL STRIP: NEGATIVE
LEUKOCYTE ESTERASE UR QL STRIP.AUTO: ABNORMAL
LYMPHOCYTES # BLD AUTO: 1.58 10*3/MM3 (ref 0.7–3.1)
LYMPHOCYTES NFR BLD AUTO: 25.3 % (ref 19.6–45.3)
MCH RBC QN AUTO: 33.1 PG (ref 26.6–33)
MCHC RBC AUTO-ENTMCNC: 34.2 G/DL (ref 31.5–35.7)
MCV RBC AUTO: 96.7 FL (ref 79–97)
MONOCYTES # BLD AUTO: 0.52 10*3/MM3 (ref 0.1–0.9)
MONOCYTES NFR BLD AUTO: 8.3 % (ref 5–12)
NEUTROPHILS NFR BLD AUTO: 3.9 10*3/MM3 (ref 1.7–7)
NEUTROPHILS NFR BLD AUTO: 62.5 % (ref 42.7–76)
NITRITE UR QL STRIP: NEGATIVE
PH UR STRIP.AUTO: 6 [PH] (ref 5–8)
PLATELET # BLD AUTO: 233 10*3/MM3 (ref 140–450)
PMV BLD AUTO: 9.6 FL (ref 6–12)
POTASSIUM SERPL-SCNC: 4.4 MMOL/L (ref 3.5–5.2)
PROT SERPL-MCNC: 6.9 G/DL (ref 6–8.5)
PROT UR QL STRIP: NEGATIVE
RBC # BLD AUTO: 3.96 10*6/MM3 (ref 3.77–5.28)
SODIUM SERPL-SCNC: 134 MMOL/L (ref 136–145)
SP GR UR STRIP: 1 (ref 1–1.03)
UROBILINOGEN UR QL STRIP: ABNORMAL
WBC NRBC COR # BLD: 6.24 10*3/MM3 (ref 3.4–10.8)

## 2023-06-07 PROCEDURE — 85025 COMPLETE CBC W/AUTO DIFF WBC: CPT | Performed by: OBSTETRICS & GYNECOLOGY

## 2023-06-07 PROCEDURE — 25010000002 BEVACIZUMAB-BVZR 400 MG/16ML SOLUTION 16 ML VIAL: Performed by: OBSTETRICS & GYNECOLOGY

## 2023-06-07 PROCEDURE — 96413 CHEMO IV INFUSION 1 HR: CPT

## 2023-06-07 PROCEDURE — 25010000002 HEPARIN LOCK FLUSH PER 10 UNITS: Performed by: OBSTETRICS & GYNECOLOGY

## 2023-06-07 PROCEDURE — 86304 IMMUNOASSAY TUMOR CA 125: CPT | Performed by: OBSTETRICS & GYNECOLOGY

## 2023-06-07 PROCEDURE — 81003 URINALYSIS AUTO W/O SCOPE: CPT | Performed by: OBSTETRICS & GYNECOLOGY

## 2023-06-07 PROCEDURE — 80053 COMPREHEN METABOLIC PANEL: CPT | Performed by: OBSTETRICS & GYNECOLOGY

## 2023-06-07 RX ORDER — BEVACIZUMAB 100 MG/4ML
INJECTION, SOLUTION INTRAVENOUS
COMMUNITY

## 2023-06-07 RX ORDER — SODIUM CHLORIDE 9 MG/ML
250 INJECTION, SOLUTION INTRAVENOUS ONCE
Status: COMPLETED | OUTPATIENT
Start: 2023-06-07 | End: 2023-06-07

## 2023-06-07 RX ORDER — SODIUM CHLORIDE 0.9 % (FLUSH) 0.9 %
10 SYRINGE (ML) INJECTION AS NEEDED
OUTPATIENT
Start: 2023-07-01

## 2023-06-07 RX ORDER — SODIUM CHLORIDE 9 MG/ML
250 INJECTION, SOLUTION INTRAVENOUS ONCE
Status: CANCELLED | OUTPATIENT
Start: 2023-06-07

## 2023-06-07 RX ORDER — HEPARIN SODIUM (PORCINE) LOCK FLUSH IV SOLN 100 UNIT/ML 100 UNIT/ML
500 SOLUTION INTRAVENOUS AS NEEDED
Status: DISCONTINUED | OUTPATIENT
Start: 2023-06-07 | End: 2023-06-08 | Stop reason: HOSPADM

## 2023-06-07 RX ORDER — PACLITAXEL 6 MG/ML
INJECTION, SOLUTION INTRAVENOUS
COMMUNITY

## 2023-06-07 RX ORDER — ENALAPRILAT 2.5 MG/2ML
0.62 INJECTION INTRAVENOUS ONCE
Status: DISCONTINUED | OUTPATIENT
Start: 2023-06-07 | End: 2023-06-08 | Stop reason: HOSPADM

## 2023-06-07 RX ORDER — ENALAPRILAT 2.5 MG/2ML
0.62 INJECTION INTRAVENOUS ONCE
Status: CANCELLED
Start: 2023-06-07 | End: 2023-06-07

## 2023-06-07 RX ORDER — HEPARIN SODIUM (PORCINE) LOCK FLUSH IV SOLN 100 UNIT/ML 100 UNIT/ML
500 SOLUTION INTRAVENOUS AS NEEDED
OUTPATIENT
Start: 2023-07-01

## 2023-06-07 RX ADMIN — HEPARIN 500 UNITS: 100 SYRINGE at 12:49

## 2023-06-07 RX ADMIN — SODIUM CHLORIDE 250 ML: 9 INJECTION, SOLUTION INTRAVENOUS at 12:12

## 2023-06-07 RX ADMIN — SODIUM CHLORIDE 740 MG: 9 INJECTION, SOLUTION INTRAVENOUS at 12:14

## 2023-06-08 ENCOUNTER — TELEPHONE (OUTPATIENT)
Dept: GYNECOLOGIC ONCOLOGY | Facility: CLINIC | Age: 73
End: 2023-06-08
Payer: MEDICARE

## 2023-06-08 NOTE — TELEPHONE ENCOUNTER
----- Message from Shannan Bess MD sent at 6/7/2023  4:08 PM EDT -----  Please inform patient of normal Ca1 25.  Thank you  ----- Message -----  From: Lab, Background User  Sent: 6/7/2023   9:48 AM EDT  To: Shannan Bess MD

## 2023-08-09 ENCOUNTER — HOSPITAL ENCOUNTER (OUTPATIENT)
Dept: ONCOLOGY | Facility: HOSPITAL | Age: 73
Discharge: HOME | End: 2023-08-09
Admitting: OBSTETRICS & GYNECOLOGY
Payer: MEDICARE

## 2023-08-09 ENCOUNTER — OFFICE VISIT (OUTPATIENT)
Dept: GYNECOLOGIC ONCOLOGY | Facility: CLINIC | Age: 73
End: 2023-08-09
Payer: MEDICARE

## 2023-08-09 VITALS
TEMPERATURE: 97.3 F | BODY MASS INDEX: 22.2 KG/M2 | HEART RATE: 80 BPM | DIASTOLIC BLOOD PRESSURE: 79 MMHG | HEIGHT: 59 IN | RESPIRATION RATE: 17 BRPM | OXYGEN SATURATION: 98 % | SYSTOLIC BLOOD PRESSURE: 168 MMHG | WEIGHT: 110.1 LBS

## 2023-08-09 VITALS — DIASTOLIC BLOOD PRESSURE: 63 MMHG | SYSTOLIC BLOOD PRESSURE: 139 MMHG

## 2023-08-09 DIAGNOSIS — C56.3 OVARIAN CANCER, BILATERAL: Primary | ICD-10-CM

## 2023-08-09 DIAGNOSIS — C77.1 METASTASIS TO MEDIASTINAL LYMPH NODE: ICD-10-CM

## 2023-08-09 LAB
ALBUMIN SERPL-MCNC: 4.1 G/DL (ref 3.5–5.2)
ALBUMIN/GLOB SERPL: 1.6 G/DL
ALP SERPL-CCNC: 18 U/L (ref 39–117)
ALT SERPL W P-5'-P-CCNC: 10 U/L (ref 1–33)
ANION GAP SERPL CALCULATED.3IONS-SCNC: 10 MMOL/L (ref 5–15)
AST SERPL-CCNC: 21 U/L (ref 1–32)
BASOPHILS # BLD AUTO: 0.03 10*3/MM3 (ref 0–0.2)
BASOPHILS NFR BLD AUTO: 0.5 % (ref 0–1.5)
BILIRUB SERPL-MCNC: 0.2 MG/DL (ref 0–1.2)
BILIRUB UR QL STRIP: NEGATIVE
BUN SERPL-MCNC: 15 MG/DL (ref 8–23)
BUN/CREAT SERPL: 19.2 (ref 7–25)
CALCIUM SPEC-SCNC: 9.2 MG/DL (ref 8.6–10.5)
CANCER AG125 SERPL QL: 15.8 U/ML (ref 0–38.1)
CHLORIDE SERPL-SCNC: 100 MMOL/L (ref 98–107)
CLARITY UR: CLEAR
CO2 SERPL-SCNC: 27 MMOL/L (ref 22–29)
COLOR UR: YELLOW
CREAT SERPL-MCNC: 0.78 MG/DL (ref 0.57–1)
DEPRECATED RDW RBC AUTO: 41.7 FL (ref 37–54)
EGFRCR SERPLBLD CKD-EPI 2021: 80.8 ML/MIN/1.73
EOSINOPHIL # BLD AUTO: 0.23 10*3/MM3 (ref 0–0.4)
EOSINOPHIL NFR BLD AUTO: 4 % (ref 0.3–6.2)
ERYTHROCYTE [DISTWIDTH] IN BLOOD BY AUTOMATED COUNT: 11.7 % (ref 12.3–15.4)
GLOBULIN UR ELPH-MCNC: 2.6 GM/DL
GLUCOSE SERPL-MCNC: 110 MG/DL (ref 65–99)
GLUCOSE UR STRIP-MCNC: NEGATIVE MG/DL
HCT VFR BLD AUTO: 39.6 % (ref 34–46.6)
HGB BLD-MCNC: 13.6 G/DL (ref 12–15.9)
HGB UR QL STRIP.AUTO: NEGATIVE
IMM GRANULOCYTES # BLD AUTO: 0 10*3/MM3 (ref 0–0.05)
IMM GRANULOCYTES NFR BLD AUTO: 0 % (ref 0–0.5)
KETONES UR QL STRIP: NEGATIVE
LEUKOCYTE ESTERASE UR QL STRIP.AUTO: NEGATIVE
LYMPHOCYTES # BLD AUTO: 1.44 10*3/MM3 (ref 0.7–3.1)
LYMPHOCYTES NFR BLD AUTO: 25 % (ref 19.6–45.3)
MCH RBC QN AUTO: 32.8 PG (ref 26.6–33)
MCHC RBC AUTO-ENTMCNC: 34.3 G/DL (ref 31.5–35.7)
MCV RBC AUTO: 95.4 FL (ref 79–97)
MONOCYTES # BLD AUTO: 0.57 10*3/MM3 (ref 0.1–0.9)
MONOCYTES NFR BLD AUTO: 9.9 % (ref 5–12)
NEUTROPHILS NFR BLD AUTO: 3.49 10*3/MM3 (ref 1.7–7)
NEUTROPHILS NFR BLD AUTO: 60.6 % (ref 42.7–76)
NITRITE UR QL STRIP: NEGATIVE
PH UR STRIP.AUTO: 6 [PH] (ref 5–8)
PLATELET # BLD AUTO: 237 10*3/MM3 (ref 140–450)
PMV BLD AUTO: 10.1 FL (ref 6–12)
POTASSIUM SERPL-SCNC: 4.1 MMOL/L (ref 3.5–5.2)
PROT SERPL-MCNC: 6.7 G/DL (ref 6–8.5)
PROT UR QL STRIP: ABNORMAL
RBC # BLD AUTO: 4.15 10*6/MM3 (ref 3.77–5.28)
SODIUM SERPL-SCNC: 137 MMOL/L (ref 136–145)
SP GR UR STRIP: 1 (ref 1–1.03)
UROBILINOGEN UR QL STRIP: ABNORMAL
WBC NRBC COR # BLD: 5.76 10*3/MM3 (ref 3.4–10.8)

## 2023-08-09 PROCEDURE — 3077F SYST BP >= 140 MM HG: CPT | Performed by: OBSTETRICS & GYNECOLOGY

## 2023-08-09 PROCEDURE — 3078F DIAST BP <80 MM HG: CPT | Performed by: OBSTETRICS & GYNECOLOGY

## 2023-08-09 PROCEDURE — 1160F RVW MEDS BY RX/DR IN RCRD: CPT | Performed by: OBSTETRICS & GYNECOLOGY

## 2023-08-09 PROCEDURE — 25010000002 BEVACIZUMAB-BVZR 400 MG/16ML SOLUTION 16 ML VIAL: Performed by: OBSTETRICS & GYNECOLOGY

## 2023-08-09 PROCEDURE — 25010000002 HEPARIN LOCK FLUSH PER 10 UNITS: Performed by: OBSTETRICS & GYNECOLOGY

## 2023-08-09 PROCEDURE — 1159F MED LIST DOCD IN RCRD: CPT | Performed by: OBSTETRICS & GYNECOLOGY

## 2023-08-09 PROCEDURE — 36591 DRAW BLOOD OFF VENOUS DEVICE: CPT

## 2023-08-09 PROCEDURE — 99213 OFFICE O/P EST LOW 20 MIN: CPT | Performed by: OBSTETRICS & GYNECOLOGY

## 2023-08-09 PROCEDURE — 85025 COMPLETE CBC W/AUTO DIFF WBC: CPT

## 2023-08-09 PROCEDURE — 86304 IMMUNOASSAY TUMOR CA 125: CPT | Performed by: OBSTETRICS & GYNECOLOGY

## 2023-08-09 PROCEDURE — 81003 URINALYSIS AUTO W/O SCOPE: CPT | Performed by: OBSTETRICS & GYNECOLOGY

## 2023-08-09 PROCEDURE — 1126F AMNT PAIN NOTED NONE PRSNT: CPT | Performed by: OBSTETRICS & GYNECOLOGY

## 2023-08-09 PROCEDURE — 25010000002 BEVACIZUMAB-BVZR 100 MG/4ML SOLUTION 4 ML VIAL: Performed by: OBSTETRICS & GYNECOLOGY

## 2023-08-09 PROCEDURE — 80053 COMPREHEN METABOLIC PANEL: CPT | Performed by: OBSTETRICS & GYNECOLOGY

## 2023-08-09 PROCEDURE — 96413 CHEMO IV INFUSION 1 HR: CPT

## 2023-08-09 RX ORDER — HEPARIN SODIUM (PORCINE) LOCK FLUSH IV SOLN 100 UNIT/ML 100 UNIT/ML
500 SOLUTION INTRAVENOUS AS NEEDED
Status: DISCONTINUED | OUTPATIENT
Start: 2023-08-09 | End: 2023-08-10 | Stop reason: HOSPADM

## 2023-08-09 RX ORDER — HEPARIN SODIUM (PORCINE) LOCK FLUSH IV SOLN 100 UNIT/ML 100 UNIT/ML
500 SOLUTION INTRAVENOUS AS NEEDED
OUTPATIENT
Start: 2023-10-01

## 2023-08-09 RX ORDER — SODIUM CHLORIDE 9 MG/ML
250 INJECTION, SOLUTION INTRAVENOUS ONCE
Status: DISCONTINUED | OUTPATIENT
Start: 2023-08-09 | End: 2023-08-10 | Stop reason: HOSPADM

## 2023-08-09 RX ORDER — SODIUM CHLORIDE 0.9 % (FLUSH) 0.9 %
10 SYRINGE (ML) INJECTION AS NEEDED
OUTPATIENT
Start: 2023-10-01

## 2023-08-09 RX ORDER — SODIUM CHLORIDE 0.9 % (FLUSH) 0.9 %
10 SYRINGE (ML) INJECTION AS NEEDED
Status: DISCONTINUED | OUTPATIENT
Start: 2023-08-09 | End: 2023-08-10 | Stop reason: HOSPADM

## 2023-08-09 RX ORDER — SODIUM CHLORIDE 9 MG/ML
250 INJECTION, SOLUTION INTRAVENOUS ONCE
Status: CANCELLED | OUTPATIENT
Start: 2023-08-09

## 2023-08-09 RX ADMIN — HEPARIN 500 UNITS: 100 SYRINGE at 14:09

## 2023-08-09 RX ADMIN — SODIUM CHLORIDE 700 MG: 9 INJECTION, SOLUTION INTRAVENOUS at 13:23

## 2023-08-09 NOTE — PROGRESS NOTES
Lyndsey [facility]  [id number]  1950    Reason for visit: History of ovarian cancer now with biopsy-proven lymph node recurrence in the chest, cycle 5 of bevacizumab    History of present illness:  The patient is a 72 y.o. year old female who presents today for treatment and evaluation of the above issues.    She presents today for cycle 6 of bevacizumab as a maintenance drug. Tolerating well with stable peripheral neuropathy.      Overall she has done well since her last cycle.  She denies abdominal pain and bloating.  No vaginal bleeding or discharge.  Denies nausea and vomiting.  Denies chest pain and SOB.  No fevers or chills.  She denies bowel or bladder dysfunction.  Her weight and appetite have been stable.  Her energy is stable.  No changes in her skin.  No issues with her port a cath.  She has been coping well.    Oncologic History:  Oncology/Hematology History   Ovarian cancer, bilateral   6/22/2018 Initial Diagnosis    Abnormal examination by GI for patient c/o bloating and diarrhea, sent to GYN. TVUS concerning for malignancy. CT showed multiple cystic masses on bilateral ovaries, possible omental implants, and large volume ascites. Paracentesis performed and cytology returned positive for metastatic adenocarcinoma, favor GYN origin. CA-125 at diagnosis = 907.7. Referred to Gyn Oncology.      6/28/2018 Procedure    Port-a-cath placement     7/6/2018 - 8/16/2018 Chemotherapy    OP OVARIAN PACLitaxel / CARBOplatin (Q21D)  Neoadjuvant x 3 cycles     7/16/2018 -  Chemotherapy    OP CENTRAL VENOUS ACCESS DEVICE CARE AND MAINTENANCE       9/6/2018 Imaging    Interval CT showed improvement in ascites and resolution of previous left pleural effusion     9/11/2018 Surgery    Exploratory laparotomy, EMMA/BSO, debulking to R=0, and partial rectal resection. 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 Primary tumor site thought to be left fallopian tube. Bilateral ovarian, tubal, and  surface involvement. Omentum, pelvic peritoneum, colon serosa, and rectal serosa involved. Macroscopic extrapelvic peritoneal implants also found. Stage IIIB grade 3         10/3/2018 - 11/29/2018 Chemotherapy    OP OVARIAN PACLitaxel / CARBOplatin AUC=6 / Bevacizumab 15 mg/kg  3 cycles adjuvant chemotherapy planned.     Patient chose to decline Avastin with all adjuvant cycles.     10/19/2018 Genetic Testing    Counseling and testing completed via CancerNext panel. 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 Patient does not have disease, but is a carrier.     1/3/2019 Imaging    Post-treatment CT chest, abdomen, and pelvis. No ascites, nodularity, or residual disease seen. No evidence of progression or active malignancy.      3/21/2019 Imaging    CT chest, abdomen, pelvis for diffuse abdominal pain. Study negative for recurrent disease     12/29/2020 Imaging    CT chest, abdomen, pelvis prior to port removal:  Stable examination with no CT evidence of acute intrathoracic, intra-abdominal or pelvic abnormality. No evidence of progression or metastatic disease.     9/10/2021 Imaging    CT chest, abdomen, pelvis:  Stable appearance of the chest abdomen and pelvis without acute pathology or evidence for occurrence or active metastatic disease     6/22/2022 Imaging    CT chest, abdomen, pelvis:  No evidence of metastatic disease within the abdomen or pelvis. Slowly enlarging bilateral pericardial fat pad lymph notes. Findings  are nonspecific given the slow progression since 2020, but are  concerning for metastatic disease or lymphoma.     8/22/2022 Progression    CT biopsy left pericardial lymph node. 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     9/20/2022 Molecular Testing    CARIS results:    PD-L1 positive  ER positive 2+, 75%  MSI stable, MMR proficient, MEGAN low, TMB low  PAMELA, BRCA 1/2, RAD51 C/D  all negative  MI negative  Her2/Kike negative     12/13/2022 - 1/25/2023 Chemotherapy    OP OVARIAN PACLitaxel / CARBOplatin (Q21D)       2/22/2023 - 4/5/2023 Chemotherapy    OP OVARIAN PACLitaxel / CARBOplatin AUC=6 / Bevacizumab 15 mg/kg       4/26/2023 Imaging    CT C/A/P  Impression:  1.Decreased size of masses in the pericardial fat.  2.Decreased size of previously noted enlarged right external iliac lymph node.     4/26/2023 -  Chemotherapy    OP OVARIAN Bevacizumab 15 mg/kg (Maintenance)             Past Medical History:   Diagnosis Date    Hypertension     Ovarian cancer 2018    chemotherapy     Wears glasses        Past Surgical History:   Procedure Laterality Date    APPENDECTOMY      possibly with tubal (not certain)    BREAST BIOPSY Bilateral 1972    cysts removed     COLONOSCOPY      EXPLORATORY LAPAROTOMY, TOTAL ABDOMINAL HYSTERECTOMY SALPINGO OOPHORECTOMY N/A 09/11/2018    Procedure: LAPAROTOMY EXPLORATORY, TOTAL ABDOMINAL HYSTERECTOMY, BILATERAL SALPINGO OOPHORECTOMY, DEBULKING;  Surgeon: Shannan Bess MD;  [facility]: [facility];  Service: Gynecology    HERNIA REPAIR Right     inguinal     SUBLINGUAL SALIVARY CYST EXCISION Right 2020    at [facility]    TUBAL ABDOMINAL LIGATION         MEDICATIONS: The current medication list was reviewed with the patient and updated in the EMR this date per the Medical Assistant. Medication dosages and frequencies were confirmed to be accurate.      Allergies:  has No Known Allergies.    Social History:   Social History     Socioeconomic History    Marital status:    Tobacco Use    Smoking status: Never    Smokeless tobacco: Never   Vaping Use    Vaping Use: Never used   Substance and Sexual Activity    Alcohol use: No    Drug use: No    Sexual activity: Defer       Family History:    Family History   Adopted: Yes   Family history unknown: Yes       Health Maintenance:    Health Maintenance   Topic Date Due    Pneumococcal Vaccine 65+ (1 - PCV) Never done     "TDAP/TD VACCINES (1 - Tdap) Never done    HEPATITIS C SCREENING  Never done    ANNUAL WELLNESS VISIT  Never done    LIPID PANEL  11/03/2021    ZOSTER VACCINE (2 of 2) 11/29/2022    COVID-19 Vaccine (5 - Pfizer risk series) 01/26/2023    DXA SCAN  03/22/2023    INFLUENZA VACCINE  10/01/2023    MAMMOGRAM  01/27/2024    COLORECTAL CANCER SCREENING  04/24/2028       Review of Systems  Please refer to history of present illness, review of systems otherwise negative.    Vitals:    08/09/23 1035   BP: 168/79   Pulse: 80   Resp: 17   Temp: 97.3 øF (36.3 øC)   TempSrc: Temporal   SpO2: 98%   Weight: 49.9 kg (110 lb 1.6 oz)   Height: 149.9 cm (59.02\")   PainSc: 0-No pain       Body mass index is 22.23 kg/mý.  Wt Readings from Last 3 Encounters:   08/09/23 49.9 kg (110 lb 1.6 oz)   07/19/23 49.7 kg (109 lb 8 oz)   06/28/23 49.9 kg (110 lb)       GENERAL: Alert, well-appearing female appearing her stated age who is in no apparent distress.   HEENT: Sclera anicteric. Head normocephalic, atraumatic.    NECK: No thyromegaly, supple  BREASTS: Deferred  CARDIOVASCULAR: Normal rate, regular rhythm.  No murmurs, rubs, gallops.  No peripheral edema.  RESPIRATORY: Lungs clear to auscultation bilaterally, normal respiratory effort on room air  BACK: Deferred  GASTROINTESTINAL: No tenderness, no distinct mass, no distention  SKIN:  Warm, dry, well-perfused.  All visible areas intact. Port in left side of chest accessed without issue  PSYCHIATRIC: AO x3, with appropriate affect, normal thought processes.  Mood and affect appropriate.  NEUROLOGIC: No focal deficits.  Moves extremities well.  MUSCULOSKELETAL: Normal gait and station.   EXTREMITIES:   No cyanosis, clubbing, symmetric. Bilateral LEs nontender  LYMPHATICS: No cervical adenopathy     PELVIC exam:    Deferred    ECOG PS 0    PROCEDURES: None    Diagnostic Data:     CT Chest With Contrast Diagnostic    Result Date: 7/19/2023  1.No acute abnormality is identified within the chest, " abdomen, or pelvis. 2.Previously noted epicardiac and right external iliac chain lymph nodes no longer appear pathologically enlarged by size criteria. 3.Please see above for additional details. Electronically Signed: Munir Dunn  7/19/2023 10:41 AM EDT  Workstation ID: [id number]    CT Abdomen Pelvis With Contrast    Result Date: 7/19/2023  1.No acute abnormality is identified within the chest, abdomen, or pelvis. 2.Previously noted epicardiac and right external iliac chain lymph nodes no longer appear pathologically enlarged by size criteria. 3.Please see above for additional details. Electronically Signed: Munir Dunn  7/19/2023 10:41 AM EDT  Workstation ID: [id number]       Lab Results   Component Value Date    WBC 5.34 07/19/2023    HGB 13.7 07/19/2023    HCT 40.3 07/19/2023    MCV 96.2 07/19/2023     07/19/2023    NEUTROABS 3.24 07/19/2023    GLUCOSE 98 07/19/2023    BUN 15 07/19/2023    CREATININE 0.86 07/19/2023    EGFRIFNONA 70 11/29/2018     (L) 07/19/2023    K 4.3 07/19/2023    CL 96 (L) 07/19/2023    CO2 25.0 07/19/2023    MG 2.1 06/20/2018    PHOS 4.6 06/20/2018    CALCIUM 9.8 07/19/2023    ALBUMIN 4.2 07/19/2023    AST 26 07/19/2023    ALT 17 07/19/2023    BILITOT 0.4 07/19/2023     Lab Results   Component Value Date     15.6 07/19/2023     15.6 06/28/2023     13.3 06/07/2023     15.8 05/17/2023     16.1 04/26/2023     17.5 04/05/2023     14.3 03/15/2023     17.8 02/22/2023     21.8 01/25/2023     26.9 01/04/2023         Assessment & Plan   This is a 72 y.o. 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    Encounter Diagnoses   Name Primary?    Ovarian cancer, bilateral Yes    Metastasis to mediastinal lymph node       Recurrent Ovarian Cancer, see detailed cancer history above.  -continue bevacizumab as a maintenance drug  -CT scans 7/19 show decrease in size of epicardiac and right external iliac chain lymph  nodes to the point of not measuring by pathologic CT criteria  -cycle 6 today    Chemotherapy-induced neuropathy  -mild at baseline since chemotherapy in 2018  -stable     Hypertension  -started bevacizumab in Feb. 2023  -On lisinopril 20mg daily, hydrochlorothiazide 25mg daily   -BP elevated today; runs normal at home, better in infusion    Proteinuria  -24-hour urine below threshold, okay to treat    Routine Health Maintenance Screening  - Counseled that mammogram and dental cleanings can be performed without additional precautions  - Patient due for colonoscopy. Counseled that although this is a minor procedure timing is important and should be performed a few days prior to beginning next bevacizumab.     - Patient sees Dr. Salazar. Instructed to check with Dr. Salazar about if bevacizumab needs to be held for specified amount of time before and after colonoscopy.     FOLLOW UP: 3 weeks    I spent 27 minutes caring for Lyndsey on this date of service. This time includes time spent by me in the following activities: preparing for the visit, reviewing tests, performing a medically appropriate examination and/or evaluation, counseling and educating the patient/family/caregiver, ordering medications, tests, or procedures, referring and communicating with other health care professionals, and documenting information in the medical record    Patient was seen and examined with Dr. Salazar,  resident, who performed portions of the examination and documentation for this patient's care under my direct supervision.  I agree with the above documentation and plan.    Shannan Bess MD  08/09/23  13:06 EDT

## 2023-08-29 NOTE — PROGRESS NOTES
Lyndsey [facility]  [id number]  1950    Reason for visit: History of ovarian cancer now with biopsy-proven lymph node recurrence in the chest, bevacizumab maintenance     History of present illness:  The patient is a 72 y.o. year old female who presents today for treatment and evaluation of the above issues.    She presents today for cycle 7 of bevacizumab as a maintenance drug. Tolerating well with stable peripheral neuropathy.      Overall she has done well since her last cycle.  She denies abdominal pain and bloating.  No vaginal bleeding or discharge.  Denies nausea and vomiting.  Denies chest pain and SOB.  No fevers or chills.  She denies bowel or bladder dysfunction.  Her weight and appetite have been stable.  Her energy is stable.  No changes in her skin.  No issues with her port a cath.  She has been coping well.    Oncologic History:  Oncology/Hematology History   Ovarian cancer, bilateral   6/22/2018 Initial Diagnosis    Abnormal examination by GI for patient c/o bloating and diarrhea, sent to GYN. TVUS concerning for malignancy. CT showed multiple cystic masses on bilateral ovaries, possible omental implants, and large volume ascites. Paracentesis performed and cytology returned positive for metastatic adenocarcinoma, favor GYN origin. CA-125 at diagnosis = 907.7. Referred to Gyn Oncology.      6/28/2018 Procedure    Port-a-cath placement     7/6/2018 - 8/16/2018 Chemotherapy    OP OVARIAN PACLitaxel / CARBOplatin (Q21D)  Neoadjuvant x 3 cycles     7/16/2018 -  Chemotherapy    OP CENTRAL VENOUS ACCESS DEVICE CARE AND MAINTENANCE       9/6/2018 Imaging    Interval CT showed improvement in ascites and resolution of previous left pleural effusion     9/11/2018 Surgery    Exploratory laparotomy, EMMA/BSO, debulking to R=0, and partial rectal resection. 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 Primary tumor site thought to be left fallopian tube. Bilateral ovarian, tubal, and  surface involvement. Omentum, pelvic peritoneum, colon serosa, and rectal serosa involved. Macroscopic extrapelvic peritoneal implants also found. Stage IIIB grade 3         10/3/2018 - 11/29/2018 Chemotherapy    OP OVARIAN PACLitaxel / CARBOplatin AUC=6 / Bevacizumab 15 mg/kg  3 cycles adjuvant chemotherapy planned.     Patient chose to decline Avastin with all adjuvant cycles.     10/19/2018 Genetic Testing    Counseling and testing completed via CancerNext panel. 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 Patient does not have disease, but is a carrier.     1/3/2019 Imaging    Post-treatment CT chest, abdomen, and pelvis. No ascites, nodularity, or residual disease seen. No evidence of progression or active malignancy.      3/21/2019 Imaging    CT chest, abdomen, pelvis for diffuse abdominal pain. Study negative for recurrent disease     12/29/2020 Imaging    CT chest, abdomen, pelvis prior to port removal:  Stable examination with no CT evidence of acute intrathoracic, intra-abdominal or pelvic abnormality. No evidence of progression or metastatic disease.     9/10/2021 Imaging    CT chest, abdomen, pelvis:  Stable appearance of the chest abdomen and pelvis without acute pathology or evidence for occurrence or active metastatic disease     6/22/2022 Imaging    CT chest, abdomen, pelvis:  No evidence of metastatic disease within the abdomen or pelvis. Slowly enlarging bilateral pericardial fat pad lymph notes. Findings  are nonspecific given the slow progression since 2020, but are  concerning for metastatic disease or lymphoma.     8/22/2022 Progression    CT biopsy left pericardial lymph node. 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     9/20/2022 Molecular Testing    CARIS results:    PD-L1 positive  ER positive 2+, 75%  MSI stable, MMR proficient, MEGAN low, TMB low  PAMELA, BRCA 1/2, RAD51 C/D  all negative  [place] negative  Her2/Kike negative     12/13/2022 - 1/25/2023 Chemotherapy    OP OVARIAN PACLitaxel / CARBOplatin (Q21D)       2/22/2023 - 4/5/2023 Chemotherapy    OP OVARIAN PACLitaxel / CARBOplatin AUC=6 / Bevacizumab 15 mg/kg       4/26/2023 Imaging    CT C/A/P  Impression:  1.Decreased size of masses in the pericardial fat.  2.Decreased size of previously noted enlarged right external iliac lymph node.     4/26/2023 -  Chemotherapy    OP OVARIAN Bevacizumab 15 mg/kg (Maintenance)             Past Medical History:   Diagnosis Date    Hypertension     Ovarian cancer 2018    chemotherapy     Wears glasses        Past Surgical History:   Procedure Laterality Date    APPENDECTOMY      possibly with tubal (not certain)    BREAST BIOPSY Bilateral 1972    cysts removed     COLONOSCOPY      EXPLORATORY LAPAROTOMY, TOTAL ABDOMINAL HYSTERECTOMY SALPINGO OOPHORECTOMY N/A 09/11/2018    Procedure: LAPAROTOMY EXPLORATORY, TOTAL ABDOMINAL HYSTERECTOMY, BILATERAL SALPINGO OOPHORECTOMY, DEBULKING;  Surgeon: Shannan Bess MD;  Location: [facility];  Service: Gynecology    HERNIA REPAIR Right     inguinal     SUBLINGUAL SALIVARY CYST EXCISION Right 2020    at [facility]    TUBAL ABDOMINAL LIGATION         MEDICATIONS: The current medication list was reviewed with the patient and updated in the EMR this date per the Medical Assistant. Medication dosages and frequencies were confirmed to be accurate.      Allergies:  has No Known Allergies.    Social History:   Social History     Socioeconomic History    Marital status:    Tobacco Use    Smoking status: Never    Smokeless tobacco: Never   Vaping Use    Vaping Use: Never used   Substance and Sexual Activity    Alcohol use: No    Drug use: No    Sexual activity: Defer       Family History:    Family History   Adopted: Yes   Family history unknown: Yes       Health Maintenance:    Health Maintenance   Topic Date Due    Pneumococcal Vaccine 65+ (1 - PCV) Never done     "TDAP/TD VACCINES (1 - Tdap) Never done    HEPATITIS C SCREENING  Never done    ANNUAL WELLNESS VISIT  Never done    LIPID PANEL  11/03/2021    ZOSTER VACCINE (2 of 2) 11/29/2022    COVID-19 Vaccine (5 - Pfizer risk series) 01/26/2023    DXA SCAN  03/22/2023    INFLUENZA VACCINE  10/01/2023    MAMMOGRAM  01/27/2024    COLORECTAL CANCER SCREENING  04/24/2028       Review of Systems  Please refer to history of present illness, review of systems otherwise negative.    Vitals:    08/30/23 1013   BP: 165/79   Pulse: 69   Resp: 17   Temp: 98.1 øF (36.7 øC)   TempSrc: Temporal   SpO2: 90%   Weight: 49.9 kg (110 lb 1.6 oz)   Height: 149.9 cm (59.02\")   PainSc: 0-No pain       Body mass index is 22.23 kg/mý.  Wt Readings from Last 3 Encounters:   08/30/23 49.9 kg (110 lb 1.6 oz)   08/09/23 49.9 kg (110 lb 1.6 oz)   07/19/23 49.7 kg (109 lb 8 oz)       GENERAL: Alert, well-appearing female appearing her stated age who is in no apparent distress.   HEENT: Sclera anicteric. Head normocephalic, atraumatic.    NECK: No thyromegaly, supple  BREASTS: Deferred  CARDIOVASCULAR: Normal rate, regular rhythm.  No murmurs, rubs, gallops.  No peripheral edema.  RESPIRATORY: Lungs clear to auscultation bilaterally, normal respiratory effort on room air  BACK: Deferred  GASTROINTESTINAL: No tenderness, no distinct mass, no distention  SKIN:  Warm, dry, well-perfused.  All visible areas intact. Port in left side of chest accessed without issue  PSYCHIATRIC: AO x3, with appropriate affect, normal thought processes.  Mood and affect appropriate.  NEUROLOGIC: No focal deficits.  Moves extremities well.  MUSCULOSKELETAL: Normal gait and station.   EXTREMITIES:   No cyanosis, clubbing, symmetric. Bilateral LEs nontender  LYMPHATICS: No cervical adenopathy     PELVIC exam:    Deferred    ECOG PS 0    PROCEDURES: None    Diagnostic Data:     No radiology results for the last 30 days.      Lab Results   Component Value Date    WBC 5.90 08/30/2023    " HGB 14.0 08/30/2023    HCT 40.5 08/30/2023    MCV 93.8 08/30/2023     08/30/2023    NEUTROABS 3.61 08/30/2023    GLUCOSE 105 (H) 08/30/2023    BUN 16 08/30/2023    CREATININE 0.93 08/30/2023    EGFRIFNONA 70 11/29/2018     (L) 08/30/2023    K 4.2 08/30/2023    CL 96 (L) 08/30/2023    CO2 27.0 08/30/2023    MG 2.1 06/20/2018    PHOS 4.6 06/20/2018    CALCIUM 9.6 08/30/2023    ALBUMIN 4.1 08/30/2023    AST 27 08/30/2023    ALT 17 08/30/2023    BILITOT 0.4 08/30/2023     Lab Results   Component Value Date     17.2 08/30/2023     15.8 08/09/2023     15.6 07/19/2023     15.6 06/28/2023     13.3 06/07/2023     15.8 05/17/2023     16.1 04/26/2023     17.5 04/05/2023     14.3 03/15/2023     17.8 02/22/2023         Assessment & Plan   This is a 72 y.o. 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    Encounter Diagnosis   Name Primary?    Ovarian cancer, bilateral Yes      Recurrent Ovarian Cancer, see detailed cancer history above.  -continue bevacizumab as a maintenance drug  -CT scans 7/19 show decrease in size of epicardiac and right external iliac chain lymph nodes to the point of not measuring by pathologic CT criteria  -labs appropriate   -cycle 7 today    Chemotherapy-induced neuropathy  -mild at baseline since chemotherapy in 2018  -stable     Hypertension  -started bevacizumab in Feb. 2023  -On lisinopril 20mg daily, hydrochlorothiazide 25mg daily     Proteinuria previously  -24-hour urine below threshold, okay to treat    Routine Health Maintenance Screening  - Counseled that mammogram and dental cleanings can be performed without additional precautions  - Patient due for colonoscopy. Counseled that although this is a minor procedure timing is important and should be performed a few days prior to beginning next bevacizumab.     - Patient sees Dr. Salazar. Instructed to check with Dr. Salazar about if bevacizumab needs to be held  for specified amount of time before and after colonoscopy.     FOLLOW UP: 3 weeks  I spent 23 minutes caring for Lyndsey on this date of service. This time includes time spent by me in the following activities: preparing for the visit, reviewing tests, performing a medically appropriate examination and/or evaluation, counseling and educating the patient/family/caregiver, ordering medications, tests, or procedures, referring and communicating with other health care professionals, and documenting information in the medical record    Patient was seen and examined with Dr. Salazar,  resident, who performed portions of the examination and documentation for this patient's care under my direct supervision.  I agree with the above documentation and plan.    Shannan Bess MD  09/01/23  08:50 EDT

## 2023-08-30 ENCOUNTER — HOSPITAL ENCOUNTER (OUTPATIENT)
Dept: ONCOLOGY | Facility: HOSPITAL | Age: 73
Discharge: HOME | End: 2023-08-30
Payer: MEDICARE

## 2023-08-30 ENCOUNTER — OFFICE VISIT (OUTPATIENT)
Dept: GYNECOLOGIC ONCOLOGY | Facility: CLINIC | Age: 73
End: 2023-08-30
Payer: MEDICARE

## 2023-08-30 VITALS
HEART RATE: 69 BPM | RESPIRATION RATE: 17 BRPM | SYSTOLIC BLOOD PRESSURE: 165 MMHG | DIASTOLIC BLOOD PRESSURE: 79 MMHG | TEMPERATURE: 98.1 F | BODY MASS INDEX: 22.2 KG/M2 | WEIGHT: 110.1 LBS | HEIGHT: 59 IN | OXYGEN SATURATION: 90 %

## 2023-08-30 VITALS — SYSTOLIC BLOOD PRESSURE: 145 MMHG | DIASTOLIC BLOOD PRESSURE: 64 MMHG | HEART RATE: 65 BPM

## 2023-08-30 DIAGNOSIS — C56.3 OVARIAN CANCER, BILATERAL: Primary | ICD-10-CM

## 2023-08-30 LAB
ALBUMIN SERPL-MCNC: 4.1 G/DL (ref 3.5–5.2)
ALBUMIN/GLOB SERPL: 1.3 G/DL
ALP SERPL-CCNC: 20 U/L (ref 39–117)
ALT SERPL W P-5'-P-CCNC: 17 U/L (ref 1–33)
ANION GAP SERPL CALCULATED.3IONS-SCNC: 11 MMOL/L (ref 5–15)
AST SERPL-CCNC: 27 U/L (ref 1–32)
BASOPHILS # BLD AUTO: 0.02 10*3/MM3 (ref 0–0.2)
BASOPHILS NFR BLD AUTO: 0.3 % (ref 0–1.5)
BILIRUB SERPL-MCNC: 0.4 MG/DL (ref 0–1.2)
BILIRUB UR QL STRIP: NEGATIVE
BUN SERPL-MCNC: 16 MG/DL (ref 8–23)
BUN/CREAT SERPL: 17.2 (ref 7–25)
CALCIUM SPEC-SCNC: 9.6 MG/DL (ref 8.6–10.5)
CANCER AG125 SERPL QL: 17.2 U/ML (ref 0–38.1)
CHLORIDE SERPL-SCNC: 96 MMOL/L (ref 98–107)
CLARITY UR: CLEAR
CO2 SERPL-SCNC: 27 MMOL/L (ref 22–29)
COLOR UR: YELLOW
CREAT SERPL-MCNC: 0.93 MG/DL (ref 0.57–1)
DEPRECATED RDW RBC AUTO: 40.8 FL (ref 37–54)
EGFRCR SERPLBLD CKD-EPI 2021: 65.4 ML/MIN/1.73
EOSINOPHIL # BLD AUTO: 0.16 10*3/MM3 (ref 0–0.4)
EOSINOPHIL NFR BLD AUTO: 2.7 % (ref 0.3–6.2)
ERYTHROCYTE [DISTWIDTH] IN BLOOD BY AUTOMATED COUNT: 11.7 % (ref 12.3–15.4)
GLOBULIN UR ELPH-MCNC: 3.2 GM/DL
GLUCOSE SERPL-MCNC: 105 MG/DL (ref 65–99)
GLUCOSE UR STRIP-MCNC: NEGATIVE MG/DL
HCT VFR BLD AUTO: 40.5 % (ref 34–46.6)
HGB BLD-MCNC: 14 G/DL (ref 12–15.9)
HGB UR QL STRIP.AUTO: NEGATIVE
IMM GRANULOCYTES # BLD AUTO: 0.01 10*3/MM3 (ref 0–0.05)
IMM GRANULOCYTES NFR BLD AUTO: 0.2 % (ref 0–0.5)
KETONES UR QL STRIP: NEGATIVE
LEUKOCYTE ESTERASE UR QL STRIP.AUTO: NEGATIVE
LYMPHOCYTES # BLD AUTO: 1.53 10*3/MM3 (ref 0.7–3.1)
LYMPHOCYTES NFR BLD AUTO: 25.9 % (ref 19.6–45.3)
MCH RBC QN AUTO: 32.4 PG (ref 26.6–33)
MCHC RBC AUTO-ENTMCNC: 34.6 G/DL (ref 31.5–35.7)
MCV RBC AUTO: 93.8 FL (ref 79–97)
MONOCYTES # BLD AUTO: 0.57 10*3/MM3 (ref 0.1–0.9)
MONOCYTES NFR BLD AUTO: 9.7 % (ref 5–12)
NEUTROPHILS NFR BLD AUTO: 3.61 10*3/MM3 (ref 1.7–7)
NEUTROPHILS NFR BLD AUTO: 61.2 % (ref 42.7–76)
NITRITE UR QL STRIP: NEGATIVE
PH UR STRIP.AUTO: 7 [PH] (ref 5–8)
PLATELET # BLD AUTO: 234 10*3/MM3 (ref 140–450)
PMV BLD AUTO: 10 FL (ref 6–12)
POTASSIUM SERPL-SCNC: 4.2 MMOL/L (ref 3.5–5.2)
PROT SERPL-MCNC: 7.3 G/DL (ref 6–8.5)
PROT UR QL STRIP: ABNORMAL
RBC # BLD AUTO: 4.32 10*6/MM3 (ref 3.77–5.28)
SODIUM SERPL-SCNC: 134 MMOL/L (ref 136–145)
SP GR UR STRIP: 1.01 (ref 1–1.03)
UROBILINOGEN UR QL STRIP: ABNORMAL
WBC NRBC COR # BLD: 5.9 10*3/MM3 (ref 3.4–10.8)

## 2023-08-30 PROCEDURE — 85025 COMPLETE CBC W/AUTO DIFF WBC: CPT

## 2023-08-30 PROCEDURE — 1126F AMNT PAIN NOTED NONE PRSNT: CPT | Performed by: OBSTETRICS & GYNECOLOGY

## 2023-08-30 PROCEDURE — 1160F RVW MEDS BY RX/DR IN RCRD: CPT | Performed by: OBSTETRICS & GYNECOLOGY

## 2023-08-30 PROCEDURE — 25010000002 BEVACIZUMAB-BVZR 100 MG/4ML SOLUTION 4 ML VIAL: Performed by: OBSTETRICS & GYNECOLOGY

## 2023-08-30 PROCEDURE — 99213 OFFICE O/P EST LOW 20 MIN: CPT | Performed by: OBSTETRICS & GYNECOLOGY

## 2023-08-30 PROCEDURE — 3077F SYST BP >= 140 MM HG: CPT | Performed by: OBSTETRICS & GYNECOLOGY

## 2023-08-30 PROCEDURE — 86304 IMMUNOASSAY TUMOR CA 125: CPT | Performed by: OBSTETRICS & GYNECOLOGY

## 2023-08-30 PROCEDURE — 81003 URINALYSIS AUTO W/O SCOPE: CPT | Performed by: OBSTETRICS & GYNECOLOGY

## 2023-08-30 PROCEDURE — 3078F DIAST BP <80 MM HG: CPT | Performed by: OBSTETRICS & GYNECOLOGY

## 2023-08-30 PROCEDURE — 1159F MED LIST DOCD IN RCRD: CPT | Performed by: OBSTETRICS & GYNECOLOGY

## 2023-08-30 PROCEDURE — 80053 COMPREHEN METABOLIC PANEL: CPT | Performed by: OBSTETRICS & GYNECOLOGY

## 2023-08-30 PROCEDURE — 25010000002 HEPARIN LOCK FLUSH PER 10 UNITS: Performed by: OBSTETRICS & GYNECOLOGY

## 2023-08-30 PROCEDURE — 96413 CHEMO IV INFUSION 1 HR: CPT

## 2023-08-30 PROCEDURE — 25010000002 BEVACIZUMAB-BVZR 400 MG/16ML SOLUTION 16 ML VIAL: Performed by: OBSTETRICS & GYNECOLOGY

## 2023-08-30 RX ORDER — HEPARIN SODIUM (PORCINE) LOCK FLUSH IV SOLN 100 UNIT/ML 100 UNIT/ML
500 SOLUTION INTRAVENOUS AS NEEDED
Status: DISCONTINUED | OUTPATIENT
Start: 2023-08-30 | End: 2023-08-31 | Stop reason: HOSPADM

## 2023-08-30 RX ORDER — LIDOCAINE AND PRILOCAINE 25; 25 MG/G; MG/G
CREAM TOPICAL AS NEEDED
Qty: 30 G | Refills: 3 | Status: SHIPPED | OUTPATIENT
Start: 2023-08-30

## 2023-08-30 RX ORDER — HEPARIN SODIUM (PORCINE) LOCK FLUSH IV SOLN 100 UNIT/ML 100 UNIT/ML
500 SOLUTION INTRAVENOUS AS NEEDED
OUTPATIENT
Start: 2023-10-01

## 2023-08-30 RX ORDER — SODIUM CHLORIDE 9 MG/ML
250 INJECTION, SOLUTION INTRAVENOUS ONCE
Status: CANCELLED | OUTPATIENT
Start: 2023-08-30

## 2023-08-30 RX ORDER — SODIUM CHLORIDE 0.9 % (FLUSH) 0.9 %
10 SYRINGE (ML) INJECTION AS NEEDED
Status: DISCONTINUED | OUTPATIENT
Start: 2023-08-30 | End: 2023-08-31 | Stop reason: HOSPADM

## 2023-08-30 RX ORDER — SODIUM CHLORIDE 9 MG/ML
250 INJECTION, SOLUTION INTRAVENOUS ONCE
Status: COMPLETED | OUTPATIENT
Start: 2023-08-30 | End: 2023-08-30

## 2023-08-30 RX ORDER — SODIUM CHLORIDE 0.9 % (FLUSH) 0.9 %
10 SYRINGE (ML) INJECTION AS NEEDED
OUTPATIENT
Start: 2023-10-01

## 2023-08-30 RX ADMIN — Medication 10 ML: at 12:35

## 2023-08-30 RX ADMIN — HEPARIN 500 UNITS: 100 SYRINGE at 12:35

## 2023-08-30 RX ADMIN — SODIUM CHLORIDE 700 MG: 9 INJECTION, SOLUTION INTRAVENOUS at 11:57

## 2023-08-30 RX ADMIN — SODIUM CHLORIDE 250 ML: 9 INJECTION, SOLUTION INTRAVENOUS at 11:57

## 2023-09-19 NOTE — PROGRESS NOTES
Lyndsey [facility]  [id number]  1950    Reason for visit: History of ovarian cancer now with biopsy-proven lymph node recurrence in the chest, bevacizumab maintenance     History of present illness:  The patient is a 72 y.o. year old female who presents today for treatment and evaluation of the above issues.    She presents today for cycle 8 of bevacizumab as a maintenance drug. Tolerating well with stable peripheral neuropathy.      Overall she has done well since her last cycle.  She denies abdominal pain and bloating.  No vaginal bleeding or discharge.  Denies nausea and vomiting.  Denies chest pain and SOB.  No fevers or chills.  She denies bowel or bladder dysfunction.  Her weight and appetite have been stable.  Her energy is stable.  No changes in her skin.  No issues with her port a cath.  She has been coping well.  She notes some continued hypertension, seeing PCP soon.    Oncologic History:  Oncology/Hematology History   Ovarian cancer, bilateral   6/22/2018 Initial Diagnosis    Abnormal examination by GI for patient c/o bloating and diarrhea, sent to GYN. TVUS concerning for malignancy. CT showed multiple cystic masses on bilateral ovaries, possible omental implants, and large volume ascites. Paracentesis performed and cytology returned positive for metastatic adenocarcinoma, favor GYN origin. CA-125 at diagnosis = 907.7. Referred to Gyn Oncology.      6/28/2018 Procedure    Port-a-cath placement     7/6/2018 - 8/16/2018 Chemotherapy    OP OVARIAN PACLitaxel / CARBOplatin (Q21D)  Neoadjuvant x 3 cycles     7/16/2018 -  Chemotherapy    OP CENTRAL VENOUS ACCESS DEVICE CARE AND MAINTENANCE       9/6/2018 Imaging    Interval CT showed improvement in ascites and resolution of previous left pleural effusion     9/11/2018 Surgery    Exploratory laparotomy, EMMA/BSO, debulking to R=0, and partial rectal resection. 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 Primary tumor site thought to  be left fallopian tube. Bilateral ovarian, tubal, and surface involvement. Omentum, pelvic peritoneum, colon serosa, and rectal serosa involved. Macroscopic extrapelvic peritoneal implants also found. Stage IIIB grade 3         10/3/2018 - 11/29/2018 Chemotherapy    OP OVARIAN PACLitaxel / CARBOplatin AUC=6 / Bevacizumab 15 mg/kg  3 cycles adjuvant chemotherapy planned.     Patient chose to decline Avastin with all adjuvant cycles.     10/19/2018 Genetic Testing    Counseling and testing completed via CancerNext panel. 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 Patient does not have disease, but is a carrier.     1/3/2019 Imaging    Post-treatment CT chest, abdomen, and pelvis. No ascites, nodularity, or residual disease seen. No evidence of progression or active malignancy.      3/21/2019 Imaging    CT chest, abdomen, pelvis for diffuse abdominal pain. Study negative for recurrent disease     12/29/2020 Imaging    CT chest, abdomen, pelvis prior to port removal:  Stable examination with no CT evidence of acute intrathoracic, intra-abdominal or pelvic abnormality. No evidence of progression or metastatic disease.     9/10/2021 Imaging    CT chest, abdomen, pelvis:  Stable appearance of the chest abdomen and pelvis without acute pathology or evidence for occurrence or active metastatic disease     6/22/2022 Imaging    CT chest, abdomen, pelvis:  No evidence of metastatic disease within the abdomen or pelvis. Slowly enlarging bilateral pericardial fat pad lymph notes. Findings  are nonspecific given the slow progression since 2020, but are  concerning for metastatic disease or lymphoma.     8/22/2022 Progression    CT biopsy left pericardial lymph node. 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     9/20/2022 Molecular Testing    CARIS results:    PD-L1 positive  ER positive 2+, 75%  MSI stable, MMR  proficient, MEGAN low, TMB low  PAMELA, BRCA 1/2, RAD51 C/D all negative  TN negative  Her2/Kike negative     12/13/2022 - 1/25/2023 Chemotherapy    OP OVARIAN PACLitaxel / CARBOplatin (Q21D)       2/22/2023 - 4/5/2023 Chemotherapy    OP OVARIAN PACLitaxel / CARBOplatin AUC=6 / Bevacizumab 15 mg/kg       4/26/2023 Imaging    CT C/A/P  Impression:  1.Decreased size of masses in the pericardial fat.  2.Decreased size of previously noted enlarged right external iliac lymph node.     4/26/2023 -  Chemotherapy    OP OVARIAN Bevacizumab 15 mg/kg (Maintenance)             Past Medical History:   Diagnosis Date    Hypertension     Ovarian cancer 2018    chemotherapy     Wears glasses        Past Surgical History:   Procedure Laterality Date    APPENDECTOMY      possibly with tubal (not certain)    BREAST BIOPSY Bilateral 1972    cysts removed     COLONOSCOPY      EXPLORATORY LAPAROTOMY, TOTAL ABDOMINAL HYSTERECTOMY SALPINGO OOPHORECTOMY N/A 09/11/2018    Procedure: LAPAROTOMY EXPLORATORY, TOTAL ABDOMINAL HYSTERECTOMY, BILATERAL SALPINGO OOPHORECTOMY, DEBULKING;  Surgeon: Shannan Bess MD;  Location: [facility];  Service: Gynecology    HERNIA REPAIR Right     inguinal     SUBLINGUAL SALIVARY CYST EXCISION Right 2020    at [facility]    TUBAL ABDOMINAL LIGATION         MEDICATIONS: The current medication list was reviewed with the patient and updated in the EMR this date per the Medical Assistant. Medication dosages and frequencies were confirmed to be accurate.      Allergies:  has No Known Allergies.    Social History:   Social History     Socioeconomic History    Marital status:    Tobacco Use    Smoking status: Never    Smokeless tobacco: Never   Vaping Use    Vaping Use: Never used   Substance and Sexual Activity    Alcohol use: No    Drug use: No    Sexual activity: Defer       Family History:    Family History   Adopted: Yes   Family history unknown: Yes       Health Maintenance:    Health Maintenance   Topic Date Due  "   Pneumococcal Vaccine 65+ (1 - PCV) Never done    TDAP/TD VACCINES (1 - Tdap) Never done    HEPATITIS C SCREENING  Never done    ANNUAL WELLNESS VISIT  Never done    LIPID PANEL  11/03/2021    ZOSTER VACCINE (2 of 2) 11/29/2022    COVID-19 Vaccine (5 - Pfizer risk series) 01/26/2023    DXA SCAN  03/22/2023    INFLUENZA VACCINE  10/01/2023    MAMMOGRAM  01/27/2024    COLORECTAL CANCER SCREENING  04/24/2028       Review of Systems  Please refer to history of present illness, review of systems otherwise negative.    Vitals:    09/20/23 1012   BP: 179/83   Pulse: 88   Resp: 18   Temp: 97.1 °F (36.2 °C)   TempSrc: Temporal   SpO2: 97%   Weight: 50.8 kg (112 lb 1.6 oz)   Height: 149.9 cm (59.02\")   PainSc: 0-No pain       Body mass index is 22.63 kg/m².  Wt Readings from Last 3 Encounters:   09/20/23 50.8 kg (112 lb 1.6 oz)   08/30/23 49.9 kg (110 lb 1.6 oz)   08/09/23 49.9 kg (110 lb 1.6 oz)       GENERAL: Alert, well-appearing female appearing her stated age who is in no apparent distress.   HEENT: Sclera anicteric. Head normocephalic, atraumatic.    NECK: No thyromegaly, supple  BREASTS: Deferred  CARDIOVASCULAR: Normal rate, regular rhythm.  No murmurs, rubs, gallops.  No peripheral edema.  RESPIRATORY: Lungs clear to auscultation bilaterally, normal respiratory effort on room air  BACK: Deferred  GASTROINTESTINAL: No tenderness, no distinct mass, no distention  SKIN:  Warm, dry, well-perfused.  All visible areas intact. Port in left side of chest accessed without issue  PSYCHIATRIC: AO x3, with appropriate affect, normal thought processes.  Mood and affect appropriate.  NEUROLOGIC: No focal deficits.  Moves extremities well.  MUSCULOSKELETAL: Normal gait and station.   EXTREMITIES:   No cyanosis, clubbing, symmetric. Bilateral LEs nontender  LYMPHATICS: No cervical adenopathy     PELVIC exam:    Deferred    ECOG PS 0    PROCEDURES: None    Diagnostic Data:     No radiology results for the last 30 days.      Lab " Results   Component Value Date    WBC 7.05 09/20/2023    HGB 14.0 09/20/2023    HCT 42.4 09/20/2023    MCV 97.7 (H) 09/20/2023     09/20/2023    NEUTROABS 4.50 09/20/2023    GLUCOSE 95 09/20/2023    BUN 19 09/20/2023    CREATININE 0.86 09/20/2023    EGFRIFNONA 70 11/29/2018     (L) 09/20/2023    K 4.5 09/20/2023    CL 97 (L) 09/20/2023    CO2 27.0 09/20/2023    MG 2.1 06/20/2018    PHOS 4.6 06/20/2018    CALCIUM 9.8 09/20/2023    ALBUMIN 4.1 09/20/2023    AST 24 09/20/2023    ALT 15 09/20/2023    BILITOT 0.4 09/20/2023     Lab Results   Component Value Date     17.2 08/30/2023     15.8 08/09/2023     15.6 07/19/2023     15.6 06/28/2023     13.3 06/07/2023     15.8 05/17/2023     16.1 04/26/2023     17.5 04/05/2023     14.3 03/15/2023     17.8 02/22/2023         Assessment & Plan   This is a 72 y.o. 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    Encounter Diagnosis   Name Primary?    Ovarian cancer, bilateral Yes      Recurrent Ovarian Cancer, see detailed cancer history above.  -s/p 6 cycles paclitaxel/carboplatin/bevacizumab   --continue bevacizumab as a maintenance drug  -CT scans 7/19 show decrease in size of epicardiac and right external iliac chain lymph nodes to the point of not measuring by pathologic CT criteria  -labs appropriate   -cycle 8 today    Chemotherapy-induced neuropathy  -mild at baseline since chemotherapy in 2018  -stable     Hypertension  -started bevacizumab in Feb. 2023  -On lisinopril 20mg daily, hydrochlorothiazide 25mg daily   -Added Vasotec IV to treatment regimen 0.65 mg IV as needed systolic blood pressure greater than 160    Proteinuria previously  -24-hour urine below threshold, okay to treat    Routine Health Maintenance Screening  - Counseled that mammogram and dental cleanings can be performed without additional precautions  - Patient due for colonoscopy. Counseled that although this is a  minor procedure timing is important and should be performed a few days prior to beginning next bevacizumab.     - Patient sees Dr. Salazar. Instructed to check with Dr. Salazar about if bevacizumab needs to be held for specified amount of time before and after colonoscopy.     FOLLOW UP: 3 weeks    Cristy Mcgowan MD   PGY3- OBGYN Resident   [facility]    I spent 27 minutes caring for Lyndsey on this date of service. This time includes time spent by me in the following activities: preparing for the visit, reviewing tests, performing a medically appropriate examination and/or evaluation, counseling and educating the patient/family/caregiver, ordering medications, tests, or procedures, referring and communicating with other health care professionals, and documenting information in the medical record    Patient was seen and examined with Dr. Mcgowan,  resident, who performed portions of the examination and documentation for this patient's care under my direct supervision.  I agree with the above documentation and plan.    Shannan Bess MD  09/20/23  11:09 EDT

## 2023-09-20 ENCOUNTER — APPOINTMENT (OUTPATIENT)
Dept: ONCOLOGY | Facility: HOSPITAL | Age: 73
End: 2023-09-20
Payer: MEDICARE

## 2023-09-20 ENCOUNTER — HOSPITAL ENCOUNTER (OUTPATIENT)
Dept: ONCOLOGY | Facility: HOSPITAL | Age: 73
Discharge: HOME | End: 2023-09-20
Admitting: OBSTETRICS & GYNECOLOGY
Payer: MEDICARE

## 2023-09-20 ENCOUNTER — OFFICE VISIT (OUTPATIENT)
Dept: GYNECOLOGIC ONCOLOGY | Facility: CLINIC | Age: 73
End: 2023-09-20
Payer: MEDICARE

## 2023-09-20 VITALS
TEMPERATURE: 97.1 F | DIASTOLIC BLOOD PRESSURE: 83 MMHG | HEIGHT: 59 IN | RESPIRATION RATE: 18 BRPM | HEART RATE: 88 BPM | SYSTOLIC BLOOD PRESSURE: 179 MMHG | OXYGEN SATURATION: 97 % | WEIGHT: 112.1 LBS | BODY MASS INDEX: 22.6 KG/M2

## 2023-09-20 VITALS — DIASTOLIC BLOOD PRESSURE: 71 MMHG | SYSTOLIC BLOOD PRESSURE: 139 MMHG

## 2023-09-20 DIAGNOSIS — C56.3 OVARIAN CANCER, BILATERAL: Primary | ICD-10-CM

## 2023-09-20 LAB
ALBUMIN SERPL-MCNC: 4.1 G/DL (ref 3.5–5.2)
ALBUMIN/GLOB SERPL: 1.4 G/DL
ALP SERPL-CCNC: 19 U/L (ref 39–117)
ALT SERPL W P-5'-P-CCNC: 15 U/L (ref 1–33)
ANION GAP SERPL CALCULATED.3IONS-SCNC: 11 MMOL/L (ref 5–15)
AST SERPL-CCNC: 24 U/L (ref 1–32)
BASOPHILS # BLD AUTO: 0.04 10*3/MM3 (ref 0–0.2)
BASOPHILS NFR BLD AUTO: 0.6 % (ref 0–1.5)
BILIRUB SERPL-MCNC: 0.4 MG/DL (ref 0–1.2)
BILIRUB UR QL STRIP: NEGATIVE
BUN SERPL-MCNC: 19 MG/DL (ref 8–23)
BUN/CREAT SERPL: 22.1 (ref 7–25)
CALCIUM SPEC-SCNC: 9.8 MG/DL (ref 8.6–10.5)
CANCER AG125 SERPL QL: 17.3 U/ML (ref 0–38.1)
CHLORIDE SERPL-SCNC: 97 MMOL/L (ref 98–107)
CLARITY UR: CLEAR
CO2 SERPL-SCNC: 27 MMOL/L (ref 22–29)
COLOR UR: YELLOW
CREAT SERPL-MCNC: 0.86 MG/DL (ref 0.57–1)
DEPRECATED RDW RBC AUTO: 44.4 FL (ref 37–54)
EGFRCR SERPLBLD CKD-EPI 2021: 71.9 ML/MIN/1.73
EOSINOPHIL # BLD AUTO: 0.15 10*3/MM3 (ref 0–0.4)
EOSINOPHIL NFR BLD AUTO: 2.1 % (ref 0.3–6.2)
ERYTHROCYTE [DISTWIDTH] IN BLOOD BY AUTOMATED COUNT: 12.2 % (ref 12.3–15.4)
GLOBULIN UR ELPH-MCNC: 2.9 GM/DL
GLUCOSE SERPL-MCNC: 95 MG/DL (ref 65–99)
GLUCOSE UR STRIP-MCNC: NEGATIVE MG/DL
HCT VFR BLD AUTO: 42.4 % (ref 34–46.6)
HGB BLD-MCNC: 14 G/DL (ref 12–15.9)
HGB UR QL STRIP.AUTO: NEGATIVE
IMM GRANULOCYTES # BLD AUTO: 0.01 10*3/MM3 (ref 0–0.05)
IMM GRANULOCYTES NFR BLD AUTO: 0.1 % (ref 0–0.5)
KETONES UR QL STRIP: NEGATIVE
LEUKOCYTE ESTERASE UR QL STRIP.AUTO: NEGATIVE
LYMPHOCYTES # BLD AUTO: 1.73 10*3/MM3 (ref 0.7–3.1)
LYMPHOCYTES NFR BLD AUTO: 24.5 % (ref 19.6–45.3)
MCH RBC QN AUTO: 32.3 PG (ref 26.6–33)
MCHC RBC AUTO-ENTMCNC: 33 G/DL (ref 31.5–35.7)
MCV RBC AUTO: 97.7 FL (ref 79–97)
MONOCYTES # BLD AUTO: 0.62 10*3/MM3 (ref 0.1–0.9)
MONOCYTES NFR BLD AUTO: 8.8 % (ref 5–12)
NEUTROPHILS NFR BLD AUTO: 4.5 10*3/MM3 (ref 1.7–7)
NEUTROPHILS NFR BLD AUTO: 63.9 % (ref 42.7–76)
NITRITE UR QL STRIP: NEGATIVE
PH UR STRIP.AUTO: 7.5 [PH] (ref 5–8)
PLATELET # BLD AUTO: 230 10*3/MM3 (ref 140–450)
PMV BLD AUTO: 9.7 FL (ref 6–12)
POTASSIUM SERPL-SCNC: 4.5 MMOL/L (ref 3.5–5.2)
PROT SERPL-MCNC: 7 G/DL (ref 6–8.5)
PROT UR QL STRIP: ABNORMAL
RBC # BLD AUTO: 4.34 10*6/MM3 (ref 3.77–5.28)
SODIUM SERPL-SCNC: 135 MMOL/L (ref 136–145)
SP GR UR STRIP: 1.01 (ref 1–1.03)
UROBILINOGEN UR QL STRIP: ABNORMAL
WBC NRBC COR # BLD: 7.05 10*3/MM3 (ref 3.4–10.8)

## 2023-09-20 PROCEDURE — 25010000002 BEVACIZUMAB-BVZR 100 MG/4ML SOLUTION 4 ML VIAL: Performed by: OBSTETRICS & GYNECOLOGY

## 2023-09-20 PROCEDURE — 81003 URINALYSIS AUTO W/O SCOPE: CPT | Performed by: OBSTETRICS & GYNECOLOGY

## 2023-09-20 PROCEDURE — 1160F RVW MEDS BY RX/DR IN RCRD: CPT | Performed by: OBSTETRICS & GYNECOLOGY

## 2023-09-20 PROCEDURE — 96374 THER/PROPH/DIAG INJ IV PUSH: CPT

## 2023-09-20 PROCEDURE — 99213 OFFICE O/P EST LOW 20 MIN: CPT | Performed by: OBSTETRICS & GYNECOLOGY

## 2023-09-20 PROCEDURE — 96413 CHEMO IV INFUSION 1 HR: CPT

## 2023-09-20 PROCEDURE — 3077F SYST BP >= 140 MM HG: CPT | Performed by: OBSTETRICS & GYNECOLOGY

## 2023-09-20 PROCEDURE — 25010000002 HEPARIN LOCK FLUSH PER 10 UNITS: Performed by: OBSTETRICS & GYNECOLOGY

## 2023-09-20 PROCEDURE — 86304 IMMUNOASSAY TUMOR CA 125: CPT | Performed by: OBSTETRICS & GYNECOLOGY

## 2023-09-20 PROCEDURE — 3079F DIAST BP 80-89 MM HG: CPT | Performed by: OBSTETRICS & GYNECOLOGY

## 2023-09-20 PROCEDURE — 1126F AMNT PAIN NOTED NONE PRSNT: CPT | Performed by: OBSTETRICS & GYNECOLOGY

## 2023-09-20 PROCEDURE — 1159F MED LIST DOCD IN RCRD: CPT | Performed by: OBSTETRICS & GYNECOLOGY

## 2023-09-20 PROCEDURE — 25010000002 BEVACIZUMAB-BVZR 400 MG/16ML SOLUTION 16 ML VIAL: Performed by: OBSTETRICS & GYNECOLOGY

## 2023-09-20 PROCEDURE — 85025 COMPLETE CBC W/AUTO DIFF WBC: CPT

## 2023-09-20 PROCEDURE — 96375 TX/PRO/DX INJ NEW DRUG ADDON: CPT

## 2023-09-20 PROCEDURE — 80053 COMPREHEN METABOLIC PANEL: CPT | Performed by: OBSTETRICS & GYNECOLOGY

## 2023-09-20 RX ORDER — ENALAPRILAT 1.25 MG/ML
0.62 INJECTION INTRAVENOUS ONCE AS NEEDED
Status: COMPLETED | OUTPATIENT
Start: 2023-09-20 | End: 2023-09-20

## 2023-09-20 RX ORDER — HEPARIN SODIUM (PORCINE) LOCK FLUSH IV SOLN 100 UNIT/ML 100 UNIT/ML
500 SOLUTION INTRAVENOUS AS NEEDED
Status: DISCONTINUED | OUTPATIENT
Start: 2023-09-20 | End: 2023-09-21 | Stop reason: HOSPADM

## 2023-09-20 RX ORDER — SODIUM CHLORIDE 0.9 % (FLUSH) 0.9 %
10 SYRINGE (ML) INJECTION AS NEEDED
OUTPATIENT
Start: 2023-10-01

## 2023-09-20 RX ORDER — SODIUM CHLORIDE 9 MG/ML
250 INJECTION, SOLUTION INTRAVENOUS ONCE
Status: CANCELLED | OUTPATIENT
Start: 2023-09-20

## 2023-09-20 RX ORDER — ENALAPRILAT 1.25 MG/ML
0.62 INJECTION INTRAVENOUS ONCE AS NEEDED
Status: CANCELLED
Start: 2023-09-20

## 2023-09-20 RX ORDER — SODIUM CHLORIDE 9 MG/ML
250 INJECTION, SOLUTION INTRAVENOUS ONCE
Status: COMPLETED | OUTPATIENT
Start: 2023-09-20 | End: 2023-09-20

## 2023-09-20 RX ORDER — HEPARIN SODIUM (PORCINE) LOCK FLUSH IV SOLN 100 UNIT/ML 100 UNIT/ML
500 SOLUTION INTRAVENOUS AS NEEDED
OUTPATIENT
Start: 2023-10-01

## 2023-09-20 RX ADMIN — ENALAPRILAT 0.62 MG: 1.25 INJECTION INTRAVENOUS at 11:34

## 2023-09-20 RX ADMIN — SODIUM CHLORIDE 700 MG: 9 INJECTION, SOLUTION INTRAVENOUS at 12:07

## 2023-09-20 RX ADMIN — SODIUM CHLORIDE 250 ML: 9 INJECTION, SOLUTION INTRAVENOUS at 11:34

## 2023-09-20 RX ADMIN — HEPARIN 500 UNITS: 100 SYRINGE at 12:50

## 2023-09-20 NOTE — ADDENDUM NOTE
Encounter addended by: Zoë Hammond RN on: 9/20/2023 3:15 PM   Actions taken: Charge Capture section accepted

## 2023-09-21 ENCOUNTER — TELEPHONE (OUTPATIENT)
Dept: GYNECOLOGIC ONCOLOGY | Facility: CLINIC | Age: 73
End: 2023-09-21
Payer: MEDICARE

## 2023-09-21 NOTE — TELEPHONE ENCOUNTER
----- Message from Shannan Bess MD sent at 9/21/2023 10:29 AM EDT -----  Please let patient know that  is normal range, stable from prior.  Thank you    ----- Message -----  From: Lab, Background User  Sent: 9/20/2023  10:18 AM EDT  To: Shannan Bess MD

## 2023-09-21 NOTE — TELEPHONE ENCOUNTER
RN received a return call from patient. RN gave patient her  results per MD request. Patient asked a few appropriate questions with RN answered. Patient verbalized understanding.

## 2023-09-21 NOTE — TELEPHONE ENCOUNTER
RN placed a call to patient to review her  level. VM was left with a request to return call and the clinic number.

## 2023-10-10 RX ORDER — SODIUM CHLORIDE 9 MG/ML
250 INJECTION, SOLUTION INTRAVENOUS ONCE
Status: CANCELLED | OUTPATIENT
Start: 2023-10-11

## 2023-10-10 NOTE — PROGRESS NOTES
Lyndsey [facility]  [id number]  1950    Reason for visit: History of ovarian cancer now with biopsy-proven lymph node recurrence in the chest, bevacizumab maintenance     History of present illness:  The patient is a 72 y.o. year old female who presents today for treatment and evaluation of the above issues.    She presents today for cycle 9 of bevacizumab as a maintenance drug. Tolerating well with stable peripheral neuropathy.   She notes some increased blood pressure today, but it is usually better at home.  She has good energy and is going to [place] next week.  She is excited about this trip.  She has typical URI symptoms from bevacizumab which are stable.  She has no other complaints today.  We discussed the proteinuria on her labs and that I am not concerned about it.  She has had this in the past.    Oncologic History:  Oncology/Hematology History   Ovarian cancer, bilateral   6/22/2018 Initial Diagnosis    Abnormal examination by GI for patient c/o bloating and diarrhea, sent to GYN. TVUS concerning for malignancy. CT showed multiple cystic masses on bilateral ovaries, possible omental implants, and large volume ascites. Paracentesis performed and cytology returned positive for metastatic adenocarcinoma, favor GYN origin. CA-125 at diagnosis = 907.7. Referred to Gyn Oncology.      6/28/2018 Procedure    Port-a-cath placement     7/6/2018 - 8/16/2018 Chemotherapy    OP OVARIAN PACLitaxel / CARBOplatin (Q21D)  Neoadjuvant x 3 cycles     7/16/2018 -  Chemotherapy    OP CENTRAL VENOUS ACCESS DEVICE CARE AND MAINTENANCE     9/6/2018 Imaging    Interval CT showed improvement in ascites and resolution of previous left pleural effusion     9/11/2018 Surgery    Exploratory laparotomy, EMMA/BSO, debulking to R=0, and partial rectal resection. 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 Primary tumor site thought to be left fallopian tube. Bilateral ovarian, tubal, and surface involvement.  Omentum, pelvic peritoneum, colon serosa, and rectal serosa involved. Macroscopic extrapelvic peritoneal implants also found. Stage IIIB grade 3         10/3/2018 - 11/29/2018 Chemotherapy    OP OVARIAN PACLitaxel / CARBOplatin AUC=6 / Bevacizumab 15 mg/kg  3 cycles adjuvant chemotherapy planned.     Patient chose to decline Avastin with all adjuvant cycles.     10/19/2018 Genetic Testing    Counseling and testing completed via CancerNext panel. 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 Patient does not have disease, but is a carrier.     1/3/2019 Imaging    Post-treatment CT chest, abdomen, and pelvis. No ascites, nodularity, or residual disease seen. No evidence of progression or active malignancy.      3/21/2019 Imaging    CT chest, abdomen, pelvis for diffuse abdominal pain. Study negative for recurrent disease     12/29/2020 Imaging    CT chest, abdomen, pelvis prior to port removal:  Stable examination with no CT evidence of acute intrathoracic, intra-abdominal or pelvic abnormality. No evidence of progression or metastatic disease.     9/10/2021 Imaging    CT chest, abdomen, pelvis:  Stable appearance of the chest abdomen and pelvis without acute pathology or evidence for occurrence or active metastatic disease     6/22/2022 Imaging    CT chest, abdomen, pelvis:  No evidence of metastatic disease within the abdomen or pelvis. Slowly enlarging bilateral pericardial fat pad lymph notes. Findings  are nonspecific given the slow progression since 2020, but are  concerning for metastatic disease or lymphoma.     8/22/2022 Progression    CT biopsy left pericardial lymph node. 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     9/20/2022 Molecular Testing    CARIS results:    PD-L1 positive  ER positive 2+, 75%  MSI stable, MMR proficient, MEGAN low, TMB low  PAMELA, BRCA 1/2, RAD51 C/D all negative  [place]  negative  Her2/Kike negative     12/13/2022 - 1/25/2023 Chemotherapy    OP OVARIAN PACLitaxel / CARBOplatin (Q21D)     2/22/2023 - 4/5/2023 Chemotherapy    OP OVARIAN PACLitaxel / CARBOplatin AUC=6 / Bevacizumab 15 mg/kg     4/26/2023 Imaging    CT C/A/P  Impression:  1.Decreased size of masses in the pericardial fat.  2.Decreased size of previously noted enlarged right external iliac lymph node.     4/26/2023 -  Chemotherapy    OP OVARIAN Bevacizumab 15 mg/kg (Maintenance)             Past Medical History:   Diagnosis Date    Hypertension     Ovarian cancer 2018    chemotherapy     Wears glasses        Past Surgical History:   Procedure Laterality Date    APPENDECTOMY      possibly with tubal (not certain)    BREAST BIOPSY Bilateral 1972    cysts removed     COLONOSCOPY      EXPLORATORY LAPAROTOMY, TOTAL ABDOMINAL HYSTERECTOMY SALPINGO OOPHORECTOMY N/A 09/11/2018    Procedure: LAPAROTOMY EXPLORATORY, TOTAL ABDOMINAL HYSTERECTOMY, BILATERAL SALPINGO OOPHORECTOMY, DEBULKING;  Surgeon: Shannan Bess MD;  Location: [facility];  Service: Gynecology    HERNIA REPAIR Right     inguinal     SUBLINGUAL SALIVARY CYST EXCISION Right 2020    at [facility]    TUBAL ABDOMINAL LIGATION         MEDICATIONS: The current medication list was reviewed with the patient and updated in the EMR this date per the Medical Assistant. Medication dosages and frequencies were confirmed to be accurate.      Allergies:  has No Known Allergies.    Social History:   Social History     Socioeconomic History    Marital status:    Tobacco Use    Smoking status: Never    Smokeless tobacco: Never   Vaping Use    Vaping Use: Never used   Substance and Sexual Activity    Alcohol use: No    Drug use: No    Sexual activity: Defer       Family History:    Family History   Adopted: Yes   Family history unknown: Yes       Health Maintenance:    Health Maintenance   Topic Date Due    Pneumococcal Vaccine 65+ (1 - PCV) Never done    TDAP/TD VACCINES (1 -  "Tdap) Never done    HEPATITIS C SCREENING  Never done    ANNUAL WELLNESS VISIT  Never done    LIPID PANEL  11/03/2021    ZOSTER VACCINE (2 of 2) 11/29/2022    DXA SCAN  03/22/2023    COVID-19 Vaccine (6 - 2023-24 season) 11/21/2023    MAMMOGRAM  07/25/2025    COLORECTAL CANCER SCREENING  04/24/2028    INFLUENZA VACCINE  Completed       Review of Systems  Please refer to history of present illness, review of systems otherwise negative.    Vitals:    10/11/23 1000   BP: 180/84   Pulse: 69   Resp: 18   Temp: 97.2 øF (36.2 øC)   TempSrc: Temporal   SpO2: 98%   Weight: 50.5 kg (111 lb 4.8 oz)   Height: 149.9 cm (59\")   PainSc: 0-No pain         Body mass index is 22.48 kg/mý.  Wt Readings from Last 3 Encounters:   10/11/23 50.5 kg (111 lb 4.8 oz)   09/20/23 50.8 kg (112 lb 1.6 oz)   08/30/23 49.9 kg (110 lb 1.6 oz)       GENERAL: Alert, well-appearing female appearing her stated age who is in no apparent distress.   HEENT: Sclera anicteric. Head normocephalic, atraumatic.    NECK: No thyromegaly, supple  BREASTS: Deferred  CARDIOVASCULAR: Normal rate, regular rhythm.  No murmurs, rubs, gallops.  No peripheral edema.  RESPIRATORY: Lungs clear to auscultation bilaterally, normal respiratory effort on room air  BACK: Deferred  GASTROINTESTINAL: No tenderness, no distinct mass, no distention  SKIN:  Warm, dry, well-perfused.  All visible areas intact. Port in left side of chest accessed without issue  PSYCHIATRIC: AO x3, with appropriate affect, normal thought processes.  Mood and affect appropriate.  NEUROLOGIC: No focal deficits.  Moves extremities well.  MUSCULOSKELETAL: Normal gait and station.   EXTREMITIES:   No cyanosis, clubbing, symmetric. Bilateral LEs nontender  LYMPHATICS: No cervical adenopathy     PELVIC exam:    Deferred    ECOG PS 0    PROCEDURES: None    Diagnostic Data:     No radiology results for the last 30 days.      Lab Results   Component Value Date    WBC 5.72 10/11/2023    HGB 13.7 10/11/2023    HCT " 40.9 10/11/2023    MCV 95.1 10/11/2023     10/11/2023    NEUTROABS 3.35 10/11/2023    GLUCOSE 86 10/11/2023    BUN 14 10/11/2023    CREATININE 0.88 10/11/2023    EGFRIFNONA 70 11/29/2018     10/11/2023    K 4.5 10/11/2023     10/11/2023    CO2 28.0 10/11/2023    MG 2.1 06/20/2018    PHOS 4.6 06/20/2018    CALCIUM 9.5 10/11/2023    ALBUMIN 4.0 10/11/2023    AST 23 10/11/2023    ALT 15 10/11/2023    BILITOT 0.3 10/11/2023     Lab Results   Component Value Date     16.1 10/11/2023     17.3 09/20/2023     17.2 08/30/2023     15.8 08/09/2023     15.6 07/19/2023     15.6 06/28/2023     13.3 06/07/2023     15.8 05/17/2023     16.1 04/26/2023     17.5 04/05/2023         Assessment & Plan   This is a 72 y.o. 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    No diagnosis found.     Recurrent Ovarian Cancer, see detailed cancer history above.  -s/p 6 cycles paclitaxel/carboplatin/bevacizumab   -Continuing bevacizumab as a maintenance drug until approximately 5/2024  -CT scans 7/19 show decrease in size of epicardiac and right external iliac chain lymph nodes to the point of not measuring by pathologic CT criteria  -labs appropriate   -cycle 9 today, planning 1 year of maintenance alejandro (started May 2023)   -We will move towards repeat imaging sometime in November.    Chemotherapy-induced neuropathy  -mild at baseline since chemotherapy in 2018  -stable     Hypertension  -started bevacizumab in Feb. 2023  -On lisinopril 20mg daily, hydrochlorothiazide 25mg daily   -Added Vasotec IV to treatment regimen 0.65 mg IV as needed systolic blood pressure greater than 160    Proteinuria previously  -24-hour urine below threshold, okay to treat    Routine Health Maintenance Screening  - Counseled that mammogram and dental cleanings can be performed without additional precautions  - Patient due for colonoscopy. Counseled that although this is a  minor procedure timing is important and should be performed a few days prior to beginning next bevacizumab.     - Patient sees Dr. Salazar. Instructed to check with Dr. Salazar about if bevacizumab needs to be held for specified amount of time before and after colonoscopy.     FOLLOW UP: 3 weeks    Cristy Mcgowan MD   PGY3- OBGYN Resident   [facility]    I spent 27 minutes caring for Lyndsey on this date of service. This time includes time spent by me in the following activities: preparing for the visit, reviewing tests, performing a medically appropriate examination and/or evaluation, counseling and educating the patient/family/caregiver, ordering medications, tests, or procedures, referring and communicating with other health care professionals, and documenting information in the medical record    Patient was seen and examined with Dr. Mcgowan,  resident, who performed portions of the examination and documentation for this patient's care under my direct supervision.  I agree with the above documentation and plan.    Shannan Bess MD  10/11/23  21:43 EDT

## 2023-10-11 ENCOUNTER — OFFICE VISIT (OUTPATIENT)
Dept: GYNECOLOGIC ONCOLOGY | Facility: CLINIC | Age: 73
End: 2023-10-11
Payer: MEDICARE

## 2023-10-11 ENCOUNTER — HOSPITAL ENCOUNTER (OUTPATIENT)
Dept: ONCOLOGY | Facility: HOSPITAL | Age: 73
Discharge: HOME | End: 2023-10-11
Admitting: OBSTETRICS & GYNECOLOGY
Payer: MEDICARE

## 2023-10-11 VITALS — HEART RATE: 67 BPM | SYSTOLIC BLOOD PRESSURE: 136 MMHG | DIASTOLIC BLOOD PRESSURE: 82 MMHG

## 2023-10-11 VITALS
HEIGHT: 59 IN | HEART RATE: 69 BPM | TEMPERATURE: 97.2 F | WEIGHT: 111.3 LBS | RESPIRATION RATE: 18 BRPM | OXYGEN SATURATION: 98 % | SYSTOLIC BLOOD PRESSURE: 180 MMHG | BODY MASS INDEX: 22.44 KG/M2 | DIASTOLIC BLOOD PRESSURE: 84 MMHG

## 2023-10-11 DIAGNOSIS — C56.3 OVARIAN CANCER, BILATERAL: Primary | ICD-10-CM

## 2023-10-11 DIAGNOSIS — C77.1 METASTASIS TO MEDIASTINAL LYMPH NODE: ICD-10-CM

## 2023-10-11 LAB
ALBUMIN SERPL-MCNC: 4 G/DL (ref 3.5–5.2)
ALBUMIN/GLOB SERPL: 1.4 G/DL
ALP SERPL-CCNC: 20 U/L (ref 39–117)
ALT SERPL W P-5'-P-CCNC: 15 U/L (ref 1–33)
ANION GAP SERPL CALCULATED.3IONS-SCNC: 10 MMOL/L (ref 5–15)
AST SERPL-CCNC: 23 U/L (ref 1–32)
BASOPHILS # BLD AUTO: 0.04 10*3/MM3 (ref 0–0.2)
BASOPHILS NFR BLD AUTO: 0.7 % (ref 0–1.5)
BILIRUB SERPL-MCNC: 0.3 MG/DL (ref 0–1.2)
BILIRUB UR QL STRIP: NEGATIVE
BUN SERPL-MCNC: 14 MG/DL (ref 8–23)
BUN/CREAT SERPL: 15.9 (ref 7–25)
CALCIUM SPEC-SCNC: 9.5 MG/DL (ref 8.6–10.5)
CANCER AG125 SERPL QL: 16.1 U/ML (ref 0–38.1)
CHLORIDE SERPL-SCNC: 100 MMOL/L (ref 98–107)
CLARITY UR: CLEAR
CO2 SERPL-SCNC: 28 MMOL/L (ref 22–29)
COLOR UR: ABNORMAL
CREAT SERPL-MCNC: 0.88 MG/DL (ref 0.57–1)
DEPRECATED RDW RBC AUTO: 43.3 FL (ref 37–54)
EGFRCR SERPLBLD CKD-EPI 2021: 69.9 ML/MIN/1.73
EOSINOPHIL # BLD AUTO: 0.19 10*3/MM3 (ref 0–0.4)
EOSINOPHIL NFR BLD AUTO: 3.3 % (ref 0.3–6.2)
ERYTHROCYTE [DISTWIDTH] IN BLOOD BY AUTOMATED COUNT: 12.3 % (ref 12.3–15.4)
GLOBULIN UR ELPH-MCNC: 2.9 GM/DL
GLUCOSE SERPL-MCNC: 86 MG/DL (ref 65–99)
GLUCOSE UR STRIP-MCNC: NEGATIVE MG/DL
HCT VFR BLD AUTO: 40.9 % (ref 34–46.6)
HGB BLD-MCNC: 13.7 G/DL (ref 12–15.9)
HGB UR QL STRIP.AUTO: NEGATIVE
IMM GRANULOCYTES # BLD AUTO: 0.01 10*3/MM3 (ref 0–0.05)
IMM GRANULOCYTES NFR BLD AUTO: 0.2 % (ref 0–0.5)
KETONES UR QL STRIP: NEGATIVE
LEUKOCYTE ESTERASE UR QL STRIP.AUTO: NEGATIVE
LYMPHOCYTES # BLD AUTO: 1.6 10*3/MM3 (ref 0.7–3.1)
LYMPHOCYTES NFR BLD AUTO: 28 % (ref 19.6–45.3)
MCH RBC QN AUTO: 31.9 PG (ref 26.6–33)
MCHC RBC AUTO-ENTMCNC: 33.5 G/DL (ref 31.5–35.7)
MCV RBC AUTO: 95.1 FL (ref 79–97)
MONOCYTES # BLD AUTO: 0.53 10*3/MM3 (ref 0.1–0.9)
MONOCYTES NFR BLD AUTO: 9.3 % (ref 5–12)
NEUTROPHILS NFR BLD AUTO: 3.35 10*3/MM3 (ref 1.7–7)
NEUTROPHILS NFR BLD AUTO: 58.5 % (ref 42.7–76)
NITRITE UR QL STRIP: NEGATIVE
PH UR STRIP.AUTO: 7.5 [PH] (ref 5–8)
PLATELET # BLD AUTO: 234 10*3/MM3 (ref 140–450)
PMV BLD AUTO: 9.8 FL (ref 6–12)
POTASSIUM SERPL-SCNC: 4.5 MMOL/L (ref 3.5–5.2)
PROT SERPL-MCNC: 6.9 G/DL (ref 6–8.5)
PROT UR QL STRIP: ABNORMAL
RBC # BLD AUTO: 4.3 10*6/MM3 (ref 3.77–5.28)
SODIUM SERPL-SCNC: 138 MMOL/L (ref 136–145)
SP GR UR STRIP: 1.01 (ref 1–1.03)
UROBILINOGEN UR QL STRIP: ABNORMAL
WBC NRBC COR # BLD: 5.72 10*3/MM3 (ref 3.4–10.8)

## 2023-10-11 PROCEDURE — 1126F AMNT PAIN NOTED NONE PRSNT: CPT | Performed by: OBSTETRICS & GYNECOLOGY

## 2023-10-11 PROCEDURE — 3079F DIAST BP 80-89 MM HG: CPT | Performed by: OBSTETRICS & GYNECOLOGY

## 2023-10-11 PROCEDURE — 1159F MED LIST DOCD IN RCRD: CPT | Performed by: OBSTETRICS & GYNECOLOGY

## 2023-10-11 PROCEDURE — 96413 CHEMO IV INFUSION 1 HR: CPT

## 2023-10-11 PROCEDURE — 25010000002 BEVACIZUMAB-BVZR 100 MG/4ML SOLUTION 4 ML VIAL: Performed by: OBSTETRICS & GYNECOLOGY

## 2023-10-11 PROCEDURE — 25010000002 BEVACIZUMAB-BVZR 400 MG/16ML SOLUTION 16 ML VIAL: Performed by: OBSTETRICS & GYNECOLOGY

## 2023-10-11 PROCEDURE — 25010000002 HEPARIN LOCK FLUSH PER 10 UNITS: Performed by: OBSTETRICS & GYNECOLOGY

## 2023-10-11 PROCEDURE — 81003 URINALYSIS AUTO W/O SCOPE: CPT | Performed by: OBSTETRICS & GYNECOLOGY

## 2023-10-11 PROCEDURE — 25810000003 SODIUM CHLORIDE 0.9 % SOLUTION: Performed by: OBSTETRICS & GYNECOLOGY

## 2023-10-11 PROCEDURE — 80053 COMPREHEN METABOLIC PANEL: CPT | Performed by: OBSTETRICS & GYNECOLOGY

## 2023-10-11 PROCEDURE — 96375 TX/PRO/DX INJ NEW DRUG ADDON: CPT

## 2023-10-11 PROCEDURE — 3077F SYST BP >= 140 MM HG: CPT | Performed by: OBSTETRICS & GYNECOLOGY

## 2023-10-11 PROCEDURE — 86304 IMMUNOASSAY TUMOR CA 125: CPT | Performed by: OBSTETRICS & GYNECOLOGY

## 2023-10-11 PROCEDURE — 99213 OFFICE O/P EST LOW 20 MIN: CPT | Performed by: OBSTETRICS & GYNECOLOGY

## 2023-10-11 PROCEDURE — 85025 COMPLETE CBC W/AUTO DIFF WBC: CPT

## 2023-10-11 PROCEDURE — 1160F RVW MEDS BY RX/DR IN RCRD: CPT | Performed by: OBSTETRICS & GYNECOLOGY

## 2023-10-11 RX ORDER — SODIUM CHLORIDE 0.9 % (FLUSH) 0.9 %
10 SYRINGE (ML) INJECTION AS NEEDED
OUTPATIENT
Start: 2024-01-01

## 2023-10-11 RX ORDER — SODIUM CHLORIDE 9 MG/ML
250 INJECTION, SOLUTION INTRAVENOUS ONCE
Status: COMPLETED | OUTPATIENT
Start: 2023-10-11 | End: 2023-10-11

## 2023-10-11 RX ORDER — ENALAPRILAT 1.25 MG/ML
0.62 INJECTION INTRAVENOUS ONCE AS NEEDED
Status: CANCELLED
Start: 2023-10-11

## 2023-10-11 RX ORDER — HEPARIN SODIUM (PORCINE) LOCK FLUSH IV SOLN 100 UNIT/ML 100 UNIT/ML
500 SOLUTION INTRAVENOUS AS NEEDED
OUTPATIENT
Start: 2024-01-01

## 2023-10-11 RX ORDER — ENALAPRILAT 1.25 MG/ML
0.62 INJECTION INTRAVENOUS ONCE AS NEEDED
Status: COMPLETED | OUTPATIENT
Start: 2023-10-11 | End: 2023-10-11

## 2023-10-11 RX ORDER — HEPARIN SODIUM (PORCINE) LOCK FLUSH IV SOLN 100 UNIT/ML 100 UNIT/ML
500 SOLUTION INTRAVENOUS AS NEEDED
Status: DISCONTINUED | OUTPATIENT
Start: 2023-10-11 | End: 2023-10-12 | Stop reason: HOSPADM

## 2023-10-11 RX ADMIN — SODIUM CHLORIDE 700 MG: 9 INJECTION, SOLUTION INTRAVENOUS at 12:24

## 2023-10-11 RX ADMIN — SODIUM CHLORIDE 250 ML: 9 INJECTION, SOLUTION INTRAVENOUS at 12:23

## 2023-10-11 RX ADMIN — HEPARIN 500 UNITS: 100 SYRINGE at 13:18

## 2023-10-11 RX ADMIN — ENALAPRILAT 0.62 MG: 1.25 INJECTION INTRAVENOUS at 11:04

## 2023-10-25 ENCOUNTER — TELEPHONE (OUTPATIENT)
Dept: GYNECOLOGIC ONCOLOGY | Facility: CLINIC | Age: 73
End: 2023-10-25
Payer: MEDICARE

## 2023-10-25 NOTE — TELEPHONE ENCOUNTER
RN told patient that she can still get x-rays done while on her chemotherapy treatment. Patient v/u and appreciation.

## 2023-10-25 NOTE — TELEPHONE ENCOUNTER
"  Caller: Lyndsey Tavares \"FABIAN\"    Relationship: Self    Best call back number: [phone]    What is the best time to reach you: ANY    Who are you requesting to speak with (clinical staff, provider,  specific staff member): CLINICAL     What was the call regarding: FABIAN HAS A DENTAL APPOINTMENT TODAY AT 11    SHE IS WANTING TO KNOW IF SHE CAN HAVE XRAY'S DONE IN HER MOUTH OR SHOULD SHE WAIT DUE TO HER HAVING TREATMENT     Is it okay if the provider responds through MyChart: NO        "

## 2023-10-30 NOTE — PROGRESS NOTES
Lyndsey [facility]  [id number]  1950    Reason for visit: History of ovarian cancer now with biopsy-proven lymph node recurrence in the chest, bevacizumab maintenance     History of present illness:  The patient is a 73 y.o. year old female who presents today for treatment and evaluation of the above issues.    She presents today for cycle 10 of bevacizumab as a maintenance drug. Tolerating well with stable peripheral neuropathy.   She notes 1 day h/o increased nasal drainage and associated cough.  Has not tested for covid/flu.  Had a great time in [place].  Some decreased appetite.  Good energy.  Normal bowel/bladder function.   No change in smell or taste.       Oncologic History:  Oncology/Hematology History   Ovarian cancer, bilateral   6/22/2018 Initial Diagnosis    Abnormal examination by GI for patient c/o bloating and diarrhea, sent to GYN. TVUS concerning for malignancy. CT showed multiple cystic masses on bilateral ovaries, possible omental implants, and large volume ascites. Paracentesis performed and cytology returned positive for metastatic adenocarcinoma, favor GYN origin. CA-125 at diagnosis = 907.7. Referred to Gyn Oncology.      6/28/2018 Procedure    Port-a-cath placement     7/6/2018 - 8/16/2018 Chemotherapy    OP OVARIAN PACLitaxel / CARBOplatin (Q21D)  Neoadjuvant x 3 cycles     7/16/2018 -  Chemotherapy    OP CENTRAL VENOUS ACCESS DEVICE CARE AND MAINTENANCE     9/6/2018 Imaging    Interval CT showed improvement in ascites and resolution of previous left pleural effusion     9/11/2018 Surgery    Exploratory laparotomy, EMMA/BSO, debulking to R=0, and partial rectal resection. 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 Primary tumor site thought to be left fallopian tube. Bilateral ovarian, tubal, and surface involvement. Omentum, pelvic peritoneum, colon serosa, and rectal serosa involved. Macroscopic extrapelvic peritoneal implants also found. Stage IIIB grade  3         10/3/2018 - 11/29/2018 Chemotherapy    OP OVARIAN PACLitaxel / CARBOplatin AUC=6 / Bevacizumab 15 mg/kg  3 cycles adjuvant chemotherapy planned.     Patient chose to decline Avastin with all adjuvant cycles.     10/19/2018 Genetic Testing    Counseling and testing completed via CancerNext panel. 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 Patient does not have disease, but is a carrier.     1/3/2019 Imaging    Post-treatment CT chest, abdomen, and pelvis. No ascites, nodularity, or residual disease seen. No evidence of progression or active malignancy.      3/21/2019 Imaging    CT chest, abdomen, pelvis for diffuse abdominal pain. Study negative for recurrent disease     12/29/2020 Imaging    CT chest, abdomen, pelvis prior to port removal:  Stable examination with no CT evidence of acute intrathoracic, intra-abdominal or pelvic abnormality. No evidence of progression or metastatic disease.     9/10/2021 Imaging    CT chest, abdomen, pelvis:  Stable appearance of the chest abdomen and pelvis without acute pathology or evidence for occurrence or active metastatic disease     6/22/2022 Imaging    CT chest, abdomen, pelvis:  No evidence of metastatic disease within the abdomen or pelvis. Slowly enlarging bilateral pericardial fat pad lymph notes. Findings  are nonspecific given the slow progression since 2020, but are  concerning for metastatic disease or lymphoma.     8/22/2022 Progression    CT biopsy left pericardial lymph node. 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     9/20/2022 Molecular Testing    CARIS results:    PD-L1 positive  ER positive 2+, 75%  MSI stable, MMR proficient, MEGAN low, TMB low  PAMELA, BRCA 1/2, RAD51 C/D all negative  [place] negative  Her2/Kike negative     12/13/2022 - 1/25/2023 Chemotherapy    OP OVARIAN PACLitaxel / CARBOplatin (Q21D)     2/22/2023 - 4/5/2023  Chemotherapy    OP OVARIAN PACLitaxel / CARBOplatin AUC=6 / Bevacizumab 15 mg/kg     4/26/2023 Imaging    CT C/A/P  Impression:  1.Decreased size of masses in the pericardial fat.  2.Decreased size of previously noted enlarged right external iliac lymph node.     4/26/2023 -  Chemotherapy    OP OVARIAN Bevacizumab 15 mg/kg (Maintenance)             Past Medical History:   Diagnosis Date    Hypertension     Ovarian cancer 2018    chemotherapy     Wears glasses        Past Surgical History:   Procedure Laterality Date    APPENDECTOMY      possibly with tubal (not certain)    BREAST BIOPSY Bilateral 1972    cysts removed     COLONOSCOPY      EXPLORATORY LAPAROTOMY, TOTAL ABDOMINAL HYSTERECTOMY SALPINGO OOPHORECTOMY N/A 09/11/2018    Procedure: LAPAROTOMY EXPLORATORY, TOTAL ABDOMINAL HYSTERECTOMY, BILATERAL SALPINGO OOPHORECTOMY, DEBULKING;  Surgeon: Shannan Bess MD;  Location: [facility];  Service: Gynecology    HERNIA REPAIR Right     inguinal     SUBLINGUAL SALIVARY CYST EXCISION Right 2020    at [facility]    TUBAL ABDOMINAL LIGATION         MEDICATIONS: The current medication list was reviewed with the patient and updated in the EMR this date per the Medical Assistant. Medication dosages and frequencies were confirmed to be accurate.      Allergies:  has No Known Allergies.    Social History:   Social History     Socioeconomic History    Marital status:    Tobacco Use    Smoking status: Never    Smokeless tobacco: Never   Vaping Use    Vaping Use: Never used   Substance and Sexual Activity    Alcohol use: No    Drug use: No    Sexual activity: Defer       Family History:    Family History   Adopted: Yes   Family history unknown: Yes       Health Maintenance:    Health Maintenance   Topic Date Due    Pneumococcal Vaccine 65+ (1 - PCV) Never done    TDAP/TD VACCINES (1 - Tdap) Never done    HEPATITIS C SCREENING  Never done    ANNUAL WELLNESS VISIT  Never done    LIPID PANEL  11/03/2021    ZOSTER VACCINE (2 of  "2) 11/29/2022    DXA SCAN  03/22/2023    COVID-19 Vaccine (6 - 2023-24 season) 11/21/2023    MAMMOGRAM  07/25/2025    COLORECTAL CANCER SCREENING  04/24/2028    INFLUENZA VACCINE  Completed       Review of Systems  Please refer to history of present illness, review of systems otherwise negative.    Vitals:    11/01/23 0949   BP: 172/76   Pulse: 85   Resp: 18   Temp: 97.6 °F (36.4 °C)   TempSrc: Temporal   SpO2: 97%   Weight: 49.9 kg (110 lb 1.6 oz)   Height: 149.9 cm (59\")   PainSc: 0-No pain     Body mass index is 22.24 kg/m².  Wt Readings from Last 3 Encounters:   11/01/23 49.9 kg (110 lb 1.6 oz)   10/11/23 50.5 kg (111 lb 4.8 oz)   09/20/23 50.8 kg (112 lb 1.6 oz)     GENERAL: Alert, well-appearing female appearing her stated age who is in no apparent distress.   HEENT: Sclera anicteric. Head normocephalic, atraumatic.  *Eyes appear slightly swollen consistent with congestion, patient wearing mask  NECK: No thyromegaly, supple  BREASTS: Deferred  CARDIOVASCULAR: Normal rate, regular rhythm.  No murmurs, rubs, gallops.  No peripheral edema.  RESPIRATORY: Lungs clear to auscultation bilaterally, normal respiratory effort on room air  BACK: Deferred  GASTROINTESTINAL: No tenderness, no distinct mass, no distention  SKIN:  Warm, dry, well-perfused.  All visible areas intact. Port in left side of chest accessed without issue  PSYCHIATRIC: AO x3, with appropriate affect, normal thought processes.  Mood and affect appropriate.  NEUROLOGIC: No focal deficits.  Moves extremities well.  MUSCULOSKELETAL: Normal gait and station.   EXTREMITIES:   No cyanosis, clubbing, symmetric. Bilateral LEs nontender  LYMPHATICS: No cervical adenopathy     PELVIC exam:    Deferred    ECOG PS 1 (cold-like symptoms)    PROCEDURES: None    Diagnostic Data:     No radiology results for the last 30 days.      Lab Results   Component Value Date    WBC 9.78 11/01/2023    HGB 14.1 11/01/2023    HCT 41.5 11/01/2023    MCV 94.5 11/01/2023    PLT " 234 11/01/2023    NEUTROABS 7.39 (H) 11/01/2023    GLUCOSE 86 10/11/2023    BUN 14 10/11/2023    CREATININE 0.88 10/11/2023    EGFRIFNONA 70 11/29/2018     10/11/2023    K 4.5 10/11/2023     10/11/2023    CO2 28.0 10/11/2023    MG 2.1 06/20/2018    PHOS 4.6 06/20/2018    CALCIUM 9.5 10/11/2023    ALBUMIN 4.0 10/11/2023    AST 23 10/11/2023    ALT 15 10/11/2023    BILITOT 0.3 10/11/2023     Lab Results   Component Value Date     16.1 10/11/2023     17.3 09/20/2023     17.2 08/30/2023     15.8 08/09/2023     15.6 07/19/2023     15.6 06/28/2023     13.3 06/07/2023     15.8 05/17/2023     16.1 04/26/2023     17.5 04/05/2023         Assessment & Plan   This is a 73 y.o. 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    Encounter Diagnoses   Name Primary?    Ovarian cancer, bilateral Yes    Metastasis to mediastinal lymph node       Recurrent Ovarian Cancer, see detailed cancer history above.  -s/p 6 cycles paclitaxel/carboplatin/bevacizumab   -Continuing bevacizumab as a maintenance drug until approximately 5/2024  -CT scans 7/19 show decrease in size of epicardiac and right external iliac chain lymph nodes to the point of not measuring by pathologic CT criteria; CT ordered for 11/27/2023  -labs appropriate   -cycle 10 today, planning 1 year of maintenance alejandro (started May 2023)   -ordered repeat imaging   -hold treatment x 1 week for URI; to test for flu and covid if not resolved by Monday 11/6/2023; OK to take over the counter meds    Chemotherapy-induced neuropathy  -mild at baseline since chemotherapy in 2018  -stable     Hypertension, bevacizumab toxicity  -On lisinopril 20mg daily, hydrochlorothiazide 25mg daily   -Added Vasotec IV to treatment regimen 0.65 mg IV as needed systolic blood pressure greater than 160    Proteinuria previously  -24-hour urine below threshold, okay to treat  -3+ on labs 11/1/2023.  Await repeat in 1  week.    Routine Health Maintenance Screening  - see previous notes    FOLLOW UP: 1 week for treatment, 4 weeks for office visit    Leatha Álvarez MD   PGY3- OBGYN Resident   [facility]    I spent 31 minutes caring for Lyndsey on this date of service. This time includes time spent by me in the following activities: preparing for the visit, reviewing tests, performing a medically appropriate examination and/or evaluation, counseling and educating the patient/family/caregiver, ordering medications, tests, or procedures, referring and communicating with other health care professionals, and documenting information in the medical record    Patient was seen and examined with Dr. Álvarez,  resident, who performed portions of the examination and documentation for this patient's care under my direct supervision.  I agree with the above documentation and plan.    Shannan Bess MD  11/01/23  10:12 EDT

## 2023-11-01 ENCOUNTER — APPOINTMENT (OUTPATIENT)
Dept: ONCOLOGY | Facility: HOSPITAL | Age: 73
End: 2023-11-01
Payer: MEDICARE

## 2023-11-01 ENCOUNTER — HOSPITAL ENCOUNTER (OUTPATIENT)
Dept: ONCOLOGY | Facility: HOSPITAL | Age: 73
Discharge: HOME | End: 2023-11-01
Admitting: OBSTETRICS & GYNECOLOGY
Payer: MEDICARE

## 2023-11-01 ENCOUNTER — OFFICE VISIT (OUTPATIENT)
Dept: GYNECOLOGIC ONCOLOGY | Facility: CLINIC | Age: 73
End: 2023-11-01
Payer: MEDICARE

## 2023-11-01 VITALS
OXYGEN SATURATION: 97 % | BODY MASS INDEX: 22.2 KG/M2 | WEIGHT: 110.1 LBS | HEART RATE: 85 BPM | RESPIRATION RATE: 18 BRPM | HEIGHT: 59 IN | SYSTOLIC BLOOD PRESSURE: 172 MMHG | DIASTOLIC BLOOD PRESSURE: 76 MMHG | TEMPERATURE: 97.6 F

## 2023-11-01 DIAGNOSIS — C56.3 OVARIAN CANCER, BILATERAL: Primary | ICD-10-CM

## 2023-11-01 DIAGNOSIS — C77.1 METASTASIS TO MEDIASTINAL LYMPH NODE: ICD-10-CM

## 2023-11-01 LAB
ALBUMIN SERPL-MCNC: 4.3 G/DL (ref 3.5–5.2)
ALBUMIN/GLOB SERPL: 1.7 G/DL
ALP SERPL-CCNC: 18 U/L (ref 39–117)
ALT SERPL W P-5'-P-CCNC: 14 U/L (ref 1–33)
ANION GAP SERPL CALCULATED.3IONS-SCNC: 11 MMOL/L (ref 5–15)
AST SERPL-CCNC: 27 U/L (ref 1–32)
BASOPHILS # BLD AUTO: 0.04 10*3/MM3 (ref 0–0.2)
BASOPHILS NFR BLD AUTO: 0.4 % (ref 0–1.5)
BILIRUB SERPL-MCNC: 0.5 MG/DL (ref 0–1.2)
BILIRUB UR QL STRIP: NEGATIVE
BUN SERPL-MCNC: 14 MG/DL (ref 8–23)
BUN/CREAT SERPL: 14.6 (ref 7–25)
CALCIUM SPEC-SCNC: 9.4 MG/DL (ref 8.6–10.5)
CANCER AG125 SERPL QL: 17.3 U/ML (ref 0–38.1)
CHLORIDE SERPL-SCNC: 98 MMOL/L (ref 98–107)
CLARITY UR: CLEAR
CO2 SERPL-SCNC: 27 MMOL/L (ref 22–29)
COLOR UR: YELLOW
CREAT SERPL-MCNC: 0.96 MG/DL (ref 0.57–1)
DEPRECATED RDW RBC AUTO: 44.4 FL (ref 37–54)
EGFRCR SERPLBLD CKD-EPI 2021: 62.6 ML/MIN/1.73
EOSINOPHIL # BLD AUTO: 0.13 10*3/MM3 (ref 0–0.4)
EOSINOPHIL NFR BLD AUTO: 1.3 % (ref 0.3–6.2)
ERYTHROCYTE [DISTWIDTH] IN BLOOD BY AUTOMATED COUNT: 12.4 % (ref 12.3–15.4)
GLOBULIN UR ELPH-MCNC: 2.5 GM/DL
GLUCOSE SERPL-MCNC: 135 MG/DL (ref 65–99)
GLUCOSE UR STRIP-MCNC: NEGATIVE MG/DL
HCT VFR BLD AUTO: 41.5 % (ref 34–46.6)
HGB BLD-MCNC: 14.1 G/DL (ref 12–15.9)
HGB UR QL STRIP.AUTO: NEGATIVE
IMM GRANULOCYTES # BLD AUTO: 0.01 10*3/MM3 (ref 0–0.05)
IMM GRANULOCYTES NFR BLD AUTO: 0.1 % (ref 0–0.5)
KETONES UR QL STRIP: NEGATIVE
LEUKOCYTE ESTERASE UR QL STRIP.AUTO: NEGATIVE
LYMPHOCYTES # BLD AUTO: 1.33 10*3/MM3 (ref 0.7–3.1)
LYMPHOCYTES NFR BLD AUTO: 13.6 % (ref 19.6–45.3)
MCH RBC QN AUTO: 32.1 PG (ref 26.6–33)
MCHC RBC AUTO-ENTMCNC: 34 G/DL (ref 31.5–35.7)
MCV RBC AUTO: 94.5 FL (ref 79–97)
MONOCYTES # BLD AUTO: 0.88 10*3/MM3 (ref 0.1–0.9)
MONOCYTES NFR BLD AUTO: 9 % (ref 5–12)
NEUTROPHILS NFR BLD AUTO: 7.39 10*3/MM3 (ref 1.7–7)
NEUTROPHILS NFR BLD AUTO: 75.6 % (ref 42.7–76)
NITRITE UR QL STRIP: NEGATIVE
PH UR STRIP.AUTO: 7.5 [PH] (ref 5–8)
PLATELET # BLD AUTO: 234 10*3/MM3 (ref 140–450)
PMV BLD AUTO: 9.8 FL (ref 6–12)
POTASSIUM SERPL-SCNC: 4.3 MMOL/L (ref 3.5–5.2)
PROT SERPL-MCNC: 6.8 G/DL (ref 6–8.5)
PROT UR QL STRIP: ABNORMAL
RBC # BLD AUTO: 4.39 10*6/MM3 (ref 3.77–5.28)
SODIUM SERPL-SCNC: 136 MMOL/L (ref 136–145)
SP GR UR STRIP: 1.01 (ref 1–1.03)
UROBILINOGEN UR QL STRIP: ABNORMAL
WBC NRBC COR # BLD: 9.78 10*3/MM3 (ref 3.4–10.8)

## 2023-11-01 PROCEDURE — 85025 COMPLETE CBC W/AUTO DIFF WBC: CPT

## 2023-11-01 PROCEDURE — 86304 IMMUNOASSAY TUMOR CA 125: CPT | Performed by: OBSTETRICS & GYNECOLOGY

## 2023-11-01 PROCEDURE — 36591 DRAW BLOOD OFF VENOUS DEVICE: CPT

## 2023-11-01 PROCEDURE — 3077F SYST BP >= 140 MM HG: CPT | Performed by: OBSTETRICS & GYNECOLOGY

## 2023-11-01 PROCEDURE — 3078F DIAST BP <80 MM HG: CPT | Performed by: OBSTETRICS & GYNECOLOGY

## 2023-11-01 PROCEDURE — 81003 URINALYSIS AUTO W/O SCOPE: CPT | Performed by: OBSTETRICS & GYNECOLOGY

## 2023-11-01 PROCEDURE — 99214 OFFICE O/P EST MOD 30 MIN: CPT | Performed by: OBSTETRICS & GYNECOLOGY

## 2023-11-01 PROCEDURE — 25010000002 HEPARIN LOCK FLUSH PER 10 UNITS: Performed by: OBSTETRICS & GYNECOLOGY

## 2023-11-01 PROCEDURE — 1126F AMNT PAIN NOTED NONE PRSNT: CPT | Performed by: OBSTETRICS & GYNECOLOGY

## 2023-11-01 PROCEDURE — 1160F RVW MEDS BY RX/DR IN RCRD: CPT | Performed by: OBSTETRICS & GYNECOLOGY

## 2023-11-01 PROCEDURE — 80053 COMPREHEN METABOLIC PANEL: CPT | Performed by: OBSTETRICS & GYNECOLOGY

## 2023-11-01 PROCEDURE — 1159F MED LIST DOCD IN RCRD: CPT | Performed by: OBSTETRICS & GYNECOLOGY

## 2023-11-01 RX ORDER — SODIUM CHLORIDE 0.9 % (FLUSH) 0.9 %
10 SYRINGE (ML) INJECTION AS NEEDED
Status: DISCONTINUED | OUTPATIENT
Start: 2023-11-01 | End: 2023-11-02 | Stop reason: HOSPADM

## 2023-11-01 RX ORDER — HEPARIN SODIUM (PORCINE) LOCK FLUSH IV SOLN 100 UNIT/ML 100 UNIT/ML
500 SOLUTION INTRAVENOUS AS NEEDED
OUTPATIENT
Start: 2024-01-01

## 2023-11-01 RX ORDER — SODIUM CHLORIDE 9 MG/ML
250 INJECTION, SOLUTION INTRAVENOUS ONCE
OUTPATIENT
Start: 2023-11-08

## 2023-11-01 RX ORDER — ENALAPRILAT 1.25 MG/ML
0.62 INJECTION INTRAVENOUS ONCE AS NEEDED
Start: 2023-11-08

## 2023-11-01 RX ORDER — HEPARIN SODIUM (PORCINE) LOCK FLUSH IV SOLN 100 UNIT/ML 100 UNIT/ML
500 SOLUTION INTRAVENOUS AS NEEDED
Status: DISCONTINUED | OUTPATIENT
Start: 2023-11-01 | End: 2023-11-02 | Stop reason: HOSPADM

## 2023-11-01 RX ORDER — SODIUM CHLORIDE 0.9 % (FLUSH) 0.9 %
10 SYRINGE (ML) INJECTION AS NEEDED
OUTPATIENT
Start: 2024-01-01

## 2023-11-01 RX ADMIN — Medication 10 ML: at 10:50

## 2023-11-01 RX ADMIN — HEPARIN 500 UNITS: 100 SYRINGE at 10:50

## 2023-11-08 ENCOUNTER — HOSPITAL ENCOUNTER (OUTPATIENT)
Dept: ONCOLOGY | Facility: HOSPITAL | Age: 73
Discharge: HOME | End: 2023-11-08
Admitting: OBSTETRICS & GYNECOLOGY
Payer: MEDICARE

## 2023-11-08 ENCOUNTER — HOSPITAL ENCOUNTER (OUTPATIENT)
Dept: ONCOLOGY | Facility: HOSPITAL | Age: 73
Discharge: HOME | End: 2023-11-08

## 2023-11-08 VITALS
RESPIRATION RATE: 18 BRPM | HEART RATE: 61 BPM | WEIGHT: 107 LBS | BODY MASS INDEX: 21.57 KG/M2 | DIASTOLIC BLOOD PRESSURE: 53 MMHG | SYSTOLIC BLOOD PRESSURE: 148 MMHG | HEIGHT: 59 IN | TEMPERATURE: 96.6 F

## 2023-11-08 DIAGNOSIS — C56.3 OVARIAN CANCER, BILATERAL: Primary | ICD-10-CM

## 2023-11-08 LAB
ALBUMIN SERPL-MCNC: 3.8 G/DL (ref 3.5–5.2)
ALBUMIN/GLOB SERPL: 1.3 G/DL
ALP SERPL-CCNC: 18 U/L (ref 39–117)
ALT SERPL W P-5'-P-CCNC: 12 U/L (ref 1–33)
ANION GAP SERPL CALCULATED.3IONS-SCNC: 11 MMOL/L (ref 5–15)
AST SERPL-CCNC: 22 U/L (ref 1–32)
BASOPHILS # BLD AUTO: 0.02 10*3/MM3 (ref 0–0.2)
BASOPHILS NFR BLD AUTO: 0.3 % (ref 0–1.5)
BILIRUB SERPL-MCNC: 0.4 MG/DL (ref 0–1.2)
BILIRUB UR QL STRIP: NEGATIVE
BUN SERPL-MCNC: 14 MG/DL (ref 8–23)
BUN/CREAT SERPL: 17.5 (ref 7–25)
CALCIUM SPEC-SCNC: 9.2 MG/DL (ref 8.6–10.5)
CANCER AG125 SERPL QL: 17 U/ML (ref 0–38.1)
CHLORIDE SERPL-SCNC: 93 MMOL/L (ref 98–107)
CLARITY UR: CLEAR
CO2 SERPL-SCNC: 25 MMOL/L (ref 22–29)
COLOR UR: YELLOW
CREAT SERPL-MCNC: 0.8 MG/DL (ref 0.57–1)
DEPRECATED RDW RBC AUTO: 41.7 FL (ref 37–54)
EGFRCR SERPLBLD CKD-EPI 2021: 77.9 ML/MIN/1.73
EOSINOPHIL # BLD AUTO: 0.14 10*3/MM3 (ref 0–0.4)
EOSINOPHIL NFR BLD AUTO: 2.3 % (ref 0.3–6.2)
ERYTHROCYTE [DISTWIDTH] IN BLOOD BY AUTOMATED COUNT: 12 % (ref 12.3–15.4)
GLOBULIN UR ELPH-MCNC: 2.9 GM/DL
GLUCOSE SERPL-MCNC: 96 MG/DL (ref 65–99)
GLUCOSE UR STRIP-MCNC: NEGATIVE MG/DL
HCT VFR BLD AUTO: 39.7 % (ref 34–46.6)
HGB BLD-MCNC: 13.5 G/DL (ref 12–15.9)
HGB UR QL STRIP.AUTO: NEGATIVE
IMM GRANULOCYTES # BLD AUTO: 0 10*3/MM3 (ref 0–0.05)
IMM GRANULOCYTES NFR BLD AUTO: 0 % (ref 0–0.5)
KETONES UR QL STRIP: NEGATIVE
LEUKOCYTE ESTERASE UR QL STRIP.AUTO: NEGATIVE
LYMPHOCYTES # BLD AUTO: 1.81 10*3/MM3 (ref 0.7–3.1)
LYMPHOCYTES NFR BLD AUTO: 30.4 % (ref 19.6–45.3)
MCH RBC QN AUTO: 31.6 PG (ref 26.6–33)
MCHC RBC AUTO-ENTMCNC: 34 G/DL (ref 31.5–35.7)
MCV RBC AUTO: 93 FL (ref 79–97)
MONOCYTES # BLD AUTO: 0.59 10*3/MM3 (ref 0.1–0.9)
MONOCYTES NFR BLD AUTO: 9.9 % (ref 5–12)
NEUTROPHILS NFR BLD AUTO: 3.4 10*3/MM3 (ref 1.7–7)
NEUTROPHILS NFR BLD AUTO: 57.1 % (ref 42.7–76)
NITRITE UR QL STRIP: NEGATIVE
PH UR STRIP.AUTO: 7 [PH] (ref 5–8)
PLATELET # BLD AUTO: 286 10*3/MM3 (ref 140–450)
PMV BLD AUTO: 9.7 FL (ref 6–12)
POTASSIUM SERPL-SCNC: 3.8 MMOL/L (ref 3.5–5.2)
PROT SERPL-MCNC: 6.7 G/DL (ref 6–8.5)
PROT UR QL STRIP: ABNORMAL
RBC # BLD AUTO: 4.27 10*6/MM3 (ref 3.77–5.28)
SODIUM SERPL-SCNC: 129 MMOL/L (ref 136–145)
SP GR UR STRIP: 1.01 (ref 1–1.03)
UROBILINOGEN UR QL STRIP: ABNORMAL
WBC NRBC COR # BLD: 5.96 10*3/MM3 (ref 3.4–10.8)

## 2023-11-08 PROCEDURE — 81003 URINALYSIS AUTO W/O SCOPE: CPT | Performed by: OBSTETRICS & GYNECOLOGY

## 2023-11-08 PROCEDURE — 25010000002 BEVACIZUMAB-BVZR 400 MG/16ML SOLUTION 16 ML VIAL: Performed by: OBSTETRICS & GYNECOLOGY

## 2023-11-08 PROCEDURE — 86304 IMMUNOASSAY TUMOR CA 125: CPT | Performed by: OBSTETRICS & GYNECOLOGY

## 2023-11-08 PROCEDURE — 25010000002 BEVACIZUMAB-BVZR 100 MG/4ML SOLUTION 4 ML VIAL: Performed by: OBSTETRICS & GYNECOLOGY

## 2023-11-08 PROCEDURE — 80053 COMPREHEN METABOLIC PANEL: CPT | Performed by: OBSTETRICS & GYNECOLOGY

## 2023-11-08 PROCEDURE — 25010000002 HEPARIN LOCK FLUSH PER 10 UNITS: Performed by: OBSTETRICS & GYNECOLOGY

## 2023-11-08 PROCEDURE — 96413 CHEMO IV INFUSION 1 HR: CPT

## 2023-11-08 PROCEDURE — 85025 COMPLETE CBC W/AUTO DIFF WBC: CPT | Performed by: OBSTETRICS & GYNECOLOGY

## 2023-11-08 PROCEDURE — 25810000003 SODIUM CHLORIDE 0.9 % SOLUTION: Performed by: OBSTETRICS & GYNECOLOGY

## 2023-11-08 RX ORDER — HEPARIN SODIUM (PORCINE) LOCK FLUSH IV SOLN 100 UNIT/ML 100 UNIT/ML
500 SOLUTION INTRAVENOUS AS NEEDED
OUTPATIENT
Start: 2024-01-01

## 2023-11-08 RX ORDER — ENALAPRILAT 1.25 MG/ML
0.62 INJECTION INTRAVENOUS ONCE AS NEEDED
Status: DISCONTINUED | OUTPATIENT
Start: 2023-11-08 | End: 2023-11-09 | Stop reason: HOSPADM

## 2023-11-08 RX ORDER — SODIUM CHLORIDE 9 MG/ML
250 INJECTION, SOLUTION INTRAVENOUS ONCE
Status: COMPLETED | OUTPATIENT
Start: 2023-11-08 | End: 2023-11-08

## 2023-11-08 RX ORDER — HEPARIN SODIUM (PORCINE) LOCK FLUSH IV SOLN 100 UNIT/ML 100 UNIT/ML
500 SOLUTION INTRAVENOUS AS NEEDED
Status: DISCONTINUED | OUTPATIENT
Start: 2023-11-08 | End: 2023-11-09 | Stop reason: HOSPADM

## 2023-11-08 RX ORDER — SODIUM CHLORIDE 0.9 % (FLUSH) 0.9 %
10 SYRINGE (ML) INJECTION AS NEEDED
OUTPATIENT
Start: 2024-01-01

## 2023-11-08 RX ADMIN — HEPARIN 500 UNITS: 100 SYRINGE at 15:10

## 2023-11-08 RX ADMIN — SODIUM CHLORIDE 700 MG: 9 INJECTION, SOLUTION INTRAVENOUS at 14:30

## 2023-11-08 RX ADMIN — SODIUM CHLORIDE 250 ML: 9 INJECTION, SOLUTION INTRAVENOUS at 14:29

## 2023-11-27 ENCOUNTER — HOSPITAL ENCOUNTER (OUTPATIENT)
Dept: CT IMAGING | Facility: HOSPITAL | Age: 73
Discharge: HOME | End: 2023-11-27
Admitting: OBSTETRICS & GYNECOLOGY
Payer: MEDICARE

## 2023-11-27 DIAGNOSIS — C56.3 OVARIAN CANCER, BILATERAL: ICD-10-CM

## 2023-11-27 DIAGNOSIS — C77.1 METASTASIS TO MEDIASTINAL LYMPH NODE: ICD-10-CM

## 2023-11-27 PROCEDURE — 25510000001 IOPAMIDOL 61 % SOLUTION: Performed by: OBSTETRICS & GYNECOLOGY

## 2023-11-27 PROCEDURE — 71260 CT THORAX DX C+: CPT

## 2023-11-27 PROCEDURE — 74177 CT ABD & PELVIS W/CONTRAST: CPT

## 2023-11-27 RX ADMIN — IOPAMIDOL 80 ML: 612 INJECTION, SOLUTION INTRAVENOUS at 12:03

## 2023-11-28 ENCOUNTER — TELEPHONE (OUTPATIENT)
Dept: GYNECOLOGIC ONCOLOGY | Facility: CLINIC | Age: 73
End: 2023-11-28
Payer: MEDICARE

## 2023-11-28 NOTE — TELEPHONE ENCOUNTER
----- Message from Shannan Bess MD sent at 11/27/2023  8:23 PM EST -----  Please notify patient that there are not  Concerning findings on CT imaging regarding cancer progression  Continue current treatment.  Thank you      ----- Message -----  From: Interface, Rad Results [place] In  Sent: 11/27/2023   4:12 PM EST  To: Shannan Bess MD

## 2023-11-28 NOTE — TELEPHONE ENCOUNTER
Called to give pt results per Dr. Bess    No answer LVM for pt to call office back    Will attempt to call again later

## 2023-11-28 NOTE — PROGRESS NOTES
Lyndsey [facility]  [id number]  1950    Reason for visit: History of ovarian adenocarcinoma now with biopsy-proven lymph node recurrence in the chest now on bevacizumab maintenance     History of present illness:  The patient is a 73 y.o. year old female who presents today for treatment and evaluation of the above issues.    She presents today for cycle 11 of bevacizumab as a maintenance drug. Tolerating well with stable peripheral neuropathy.     Today, she notes good appetite.  She is doing intermittent fasting - doing well with this, feels less bloated.  She reports good energy and is performing ADLs.  She notes no mucositis.  Other issues include HTN.     Oncologic History:  Oncology/Hematology History   Ovarian cancer, bilateral   6/22/2018 Initial Diagnosis    Abnormal examination by GI for patient c/o bloating and diarrhea, sent to GYN. TVUS concerning for malignancy. CT showed multiple cystic masses on bilateral ovaries, possible omental implants, and large volume ascites. Paracentesis performed and cytology returned positive for metastatic adenocarcinoma, favor GYN origin. CA-125 at diagnosis = 907.7. Referred to Gyn Oncology.      6/28/2018 Procedure    Port-a-cath placement     7/6/2018 - 8/16/2018 Chemotherapy    OP OVARIAN PACLitaxel / CARBOplatin (Q21D)  Neoadjuvant x 3 cycles     7/16/2018 -  Chemotherapy    OP CENTRAL VENOUS ACCESS DEVICE CARE AND MAINTENANCE     9/6/2018 Imaging    Interval CT showed improvement in ascites and resolution of previous left pleural effusion     9/11/2018 Surgery    Exploratory laparotomy, EMMA/BSO, debulking to R=0, and partial rectal resection. 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 Primary tumor site thought to be left fallopian tube. Bilateral ovarian, tubal, and surface involvement. Omentum, pelvic peritoneum, colon serosa, and rectal serosa involved. Macroscopic extrapelvic peritoneal implants also found. Stage IIIB grade 3          10/3/2018 - 11/29/2018 Chemotherapy    OP OVARIAN PACLitaxel / CARBOplatin AUC=6 / Bevacizumab 15 mg/kg  3 cycles adjuvant chemotherapy planned.     Patient chose to decline Avastin with all adjuvant cycles.     10/19/2018 Genetic Testing    Counseling and testing completed via CancerNext panel. 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 Patient does not have disease, but is a carrier.     1/3/2019 Imaging    Post-treatment CT chest, abdomen, and pelvis. No ascites, nodularity, or residual disease seen. No evidence of progression or active malignancy.      3/21/2019 Imaging    CT chest, abdomen, pelvis for diffuse abdominal pain. Study negative for recurrent disease     12/29/2020 Imaging    CT chest, abdomen, pelvis prior to port removal:  Stable examination with no CT evidence of acute intrathoracic, intra-abdominal or pelvic abnormality. No evidence of progression or metastatic disease.     9/10/2021 Imaging    CT chest, abdomen, pelvis:  Stable appearance of the chest abdomen and pelvis without acute pathology or evidence for occurrence or active metastatic disease     6/22/2022 Imaging    CT chest, abdomen, pelvis:  No evidence of metastatic disease within the abdomen or pelvis. Slowly enlarging bilateral pericardial fat pad lymph notes. Findings  are nonspecific given the slow progression since 2020, but are  concerning for metastatic disease or lymphoma.     8/22/2022 Progression    CT biopsy left pericardial lymph node. 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     9/20/2022 Molecular Testing    CARIS results:    PD-L1 positive  ER positive 2+, 75%  MSI stable, MMR proficient, MEGAN low, TMB low  PAMELA, BRCA 1/2, RAD51 C/D all negative  WI negative  Her2/Kike negative     12/13/2022 - 1/25/2023 Chemotherapy    OP OVARIAN PACLitaxel / CARBOplatin (Q21D)     2/22/2023 - 4/5/2023 Chemotherapy     OP OVARIAN PACLitaxel / CARBOplatin AUC=6 / Bevacizumab 15 mg/kg     4/26/2023 Imaging    CT C/A/P  Impression:  1.Decreased size of masses in the pericardial fat.  2.Decreased size of previously noted enlarged right external iliac lymph node.     4/26/2023 -  Chemotherapy    OP OVARIAN Bevacizumab 15 mg/kg (Maintenance)         Past Medical History:   Diagnosis Date    Hypertension     Ovarian cancer 2018    chemotherapy     Wears glasses      Past Surgical History:   Procedure Laterality Date    APPENDECTOMY      possibly with tubal (not certain)    BREAST BIOPSY Bilateral 1972    cysts removed     COLONOSCOPY      EXPLORATORY LAPAROTOMY, TOTAL ABDOMINAL HYSTERECTOMY SALPINGO OOPHORECTOMY N/A 09/11/2018    Procedure: LAPAROTOMY EXPLORATORY, TOTAL ABDOMINAL HYSTERECTOMY, BILATERAL SALPINGO OOPHORECTOMY, DEBULKING;  Surgeon: Shannan Bess MD;  Location: [facility];  Service: Gynecology    HERNIA REPAIR Right     inguinal     SUBLINGUAL SALIVARY CYST EXCISION Right 2020    at [facility]    TUBAL ABDOMINAL LIGATION       MEDICATIONS: The current medication list was reviewed with the patient and updated in the EMR this date per the Medical Assistant. Medication dosages and frequencies were confirmed to be accurate.      Allergies:  has No Known Allergies.    Social History:   Social History     Socioeconomic History    Marital status:    Tobacco Use    Smoking status: Never    Smokeless tobacco: Never   Vaping Use    Vaping Use: Never used   Substance and Sexual Activity    Alcohol use: No    Drug use: No    Sexual activity: Defer     Family History:    Family History   Adopted: Yes   Family history unknown: Yes     Health Maintenance:    Health Maintenance   Topic Date Due    Pneumococcal Vaccine 65+ (1 - PCV) Never done    TDAP/TD VACCINES (1 - Tdap) Never done    HEPATITIS C SCREENING  Never done    ANNUAL WELLNESS VISIT  Never done    LIPID PANEL  11/03/2021    ZOSTER VACCINE (2 of 2) 11/29/2022    DXA SCAN   "03/22/2023    COVID-19 Vaccine (6 - 2023-24 season) 11/21/2023    MAMMOGRAM  07/25/2025    COLORECTAL CANCER SCREENING  04/24/2028    INFLUENZA VACCINE  Completed     Review of Systems  Please refer to history of present illness, review of systems otherwise negative.    Vitals:    11/29/23 0852   BP: 172/84   Pulse: 85   Resp: 18   Temp: 97.3 °F (36.3 °C)   TempSrc: Temporal   SpO2: 95%   Weight: 48.8 kg (107 lb 9.6 oz)   Height: 149.9 cm (59.02\")   PainSc: 0-No pain       Body mass index is 21.72 kg/m².  Wt Readings from Last 3 Encounters:   11/29/23 48.8 kg (107 lb 9.6 oz)   11/08/23 48.5 kg (107 lb)   11/01/23 49.9 kg (110 lb 1.6 oz)     GENERAL: Alert, well-appearing female appearing her stated age who is in no apparent distress.   HEENT: Sclera anicteric. Head normocephalic, atraumatic.   NECK: No thyromegaly, supple  BREASTS: Deferred  CARDIOVASCULAR: Normal rate, regular rhythm.  No murmurs, rubs, gallops.  No peripheral edema.  RESPIRATORY: Lungs clear to auscultation bilaterally, normal respiratory effort on room air  BACK: Deferred  GASTROINTESTINAL: No tenderness, no distinct mass, no distention  SKIN:  Warm, dry, well-perfused.  All visible areas intact. Port in left side of chest accessed without issue  PSYCHIATRIC: AO x3, with appropriate affect, normal thought processes.  Mood and affect appropriate.  NEUROLOGIC: No focal deficits.  Moves extremities well.  MUSCULOSKELETAL: Normal gait and station.   EXTREMITIES:   No cyanosis, clubbing, symmetric. Bilateral LEs nontender  LYMPHATICS: No cervical adenopathy     PELVIC exam:    Deferred    ECOG PS 0    PROCEDURES: None    Diagnostic Data:     CT Chest With Contrast Diagnostic    Result Date: 11/27/2023  Impression: 1.Atelectasis or scarring right cardiophrenic angle. 2.Minimal coronary artery calcification. 3.Hepatic steatosis 4.Moderate stool burden which correlate to constipation 5.Small left internal iliac lymph nodes which have been suggested and " appear stable 6.Grade 1 anterolisthesis of L5 on S1. It looks like there probably are pars defects obscured by degenerative change . Electronically Signed: Zenon Stark MD  11/27/2023 4:09 PM EST  Workstation ID: [id number]    CT Abdomen Pelvis With Contrast    Result Date: 11/27/2023  Impression: 1.Atelectasis or scarring right cardiophrenic angle. 2.Minimal coronary artery calcification. 3.Hepatic steatosis 4.Moderate stool burden which correlate to constipation 5.Small left internal iliac lymph nodes which have been suggested and appear stable 6.Grade 1 anterolisthesis of L5 on S1. It looks like there probably are pars defects obscured by degenerative change . Electronically Signed: Zenon Stark MD  11/27/2023 4:09 PM EST  Workstation ID: [id number]     Lab Results   Component Value Date    WBC 4.88 11/29/2023    HGB 13.8 11/29/2023    HCT 40.0 11/29/2023    MCV 92.4 11/29/2023     11/29/2023    NEUTROABS 2.99 11/29/2023    GLUCOSE 96 11/08/2023    BUN 14 11/08/2023    CREATININE 0.80 11/08/2023    EGFRIFNONA 70 11/29/2018     (L) 11/08/2023    K 3.8 11/08/2023    CL 93 (L) 11/08/2023    CO2 25.0 11/08/2023    MG 2.1 06/20/2018    PHOS 4.6 06/20/2018    CALCIUM 9.2 11/08/2023    ALBUMIN 3.8 11/08/2023    AST 22 11/08/2023    ALT 12 11/08/2023    BILITOT 0.4 11/08/2023     Lab Results   Component Value Date     17.0 11/08/2023     17.3 11/01/2023     16.1 10/11/2023     17.3 09/20/2023     17.2 08/30/2023     15.8 08/09/2023     15.6 07/19/2023     15.6 06/28/2023     13.3 06/07/2023     15.8 05/17/2023       3+ proteinuria, previous 24 hour urine acceptable    Assessment & Plan   This is a 73 y.o. 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    Encounter Diagnoses   Name Primary?    Ovarian cancer, bilateral Yes    Metastasis to mediastinal lymph node      Recurrent Ovarian Cancer, see detailed cancer history above.  -s/p 6  cycles paclitaxel/carboplatin/bevacizumab   -Continuing bevacizumab as a maintenance drug until approximately 5/2024  -CT scans 7/19 show decrease in size of epicardiac and right external iliac chain lymph nodes to the point of not measuring by pathologic CT criteria  -CT 11/27: no concerning mediastinal/hilar lymphadenopathy, small stable internal ilac lymph nodes (largest 8.2 mm), hepatic steatosis; discussed with patient  -labs appropriate   -cycle 11 today, planning 1 year of maintenance alejandro (started May 2023)     Chemotherapy-induced neuropathy  -mild at baseline since chemotherapy in 2018  -stable     Hypertension, bevacizumab toxicity  -On lisinopril 20mg daily, hydrochlorothiazide 25mg daily   -Continue Vasotec IV to treatment regimen 0.65 mg IV as needed for systolic blood pressure greater than 160    Proteinuria   -24-hour urine below threshold, okay to treat  -3+ on labs 11/1/2023, 100 on 11/8/2023    Routine Health Maintenance Screening  - see previous notes    FOLLOW UP: 3 weeks     Wendy Hayes MD   PGY2- OBGYN Resident   [facility]    I spent 23 minutes caring for Lyndsey on this date of service. This time includes time spent by me in the following activities: preparing for the visit, reviewing tests, performing a medically appropriate examination and/or evaluation, counseling and educating the patient/family/caregiver, ordering medications, tests, or procedures, referring and communicating with other health care professionals, and documenting information in the medical record    Patient was seen and examined with Dr. Hayes,  resident, who performed portions of the examination and documentation for this patient's care under my direct supervision.  I agree with the above documentation and plan.    Shannan Bess MD  11/29/23  09:28 EST

## 2023-11-29 ENCOUNTER — HOSPITAL ENCOUNTER (OUTPATIENT)
Dept: ONCOLOGY | Facility: HOSPITAL | Age: 73
Discharge: HOME | End: 2023-11-29
Payer: MEDICARE

## 2023-11-29 ENCOUNTER — OFFICE VISIT (OUTPATIENT)
Dept: GYNECOLOGIC ONCOLOGY | Facility: CLINIC | Age: 73
End: 2023-11-29
Payer: MEDICARE

## 2023-11-29 VITALS
RESPIRATION RATE: 18 BRPM | BODY MASS INDEX: 21.69 KG/M2 | HEART RATE: 85 BPM | DIASTOLIC BLOOD PRESSURE: 84 MMHG | WEIGHT: 107.6 LBS | HEIGHT: 59 IN | TEMPERATURE: 97.3 F | SYSTOLIC BLOOD PRESSURE: 172 MMHG | OXYGEN SATURATION: 95 %

## 2023-11-29 VITALS — SYSTOLIC BLOOD PRESSURE: 143 MMHG | DIASTOLIC BLOOD PRESSURE: 59 MMHG | HEART RATE: 72 BPM

## 2023-11-29 DIAGNOSIS — C77.1 METASTASIS TO MEDIASTINAL LYMPH NODE: ICD-10-CM

## 2023-11-29 DIAGNOSIS — C56.3 OVARIAN CANCER, BILATERAL: Primary | ICD-10-CM

## 2023-11-29 LAB
ALBUMIN SERPL-MCNC: 4.1 G/DL (ref 3.5–5.2)
ALBUMIN/GLOB SERPL: 1.5 G/DL
ALP SERPL-CCNC: 16 U/L (ref 39–117)
ALT SERPL W P-5'-P-CCNC: 12 U/L (ref 1–33)
ANION GAP SERPL CALCULATED.3IONS-SCNC: 10 MMOL/L (ref 5–15)
AST SERPL-CCNC: 28 U/L (ref 1–32)
BASOPHILS # BLD AUTO: 0.02 10*3/MM3 (ref 0–0.2)
BASOPHILS NFR BLD AUTO: 0.4 % (ref 0–1.5)
BILIRUB SERPL-MCNC: 0.5 MG/DL (ref 0–1.2)
BILIRUB UR QL STRIP: NEGATIVE
BUN SERPL-MCNC: 12 MG/DL (ref 8–23)
BUN/CREAT SERPL: 15.4 (ref 7–25)
CALCIUM SPEC-SCNC: 9.6 MG/DL (ref 8.6–10.5)
CANCER AG125 SERPL QL: 14.7 U/ML (ref 0–38.1)
CHLORIDE SERPL-SCNC: 95 MMOL/L (ref 98–107)
CLARITY UR: CLEAR
CO2 SERPL-SCNC: 27 MMOL/L (ref 22–29)
COLOR UR: YELLOW
CREAT SERPL-MCNC: 0.78 MG/DL (ref 0.57–1)
DEPRECATED RDW RBC AUTO: 42.4 FL (ref 37–54)
EGFRCR SERPLBLD CKD-EPI 2021: 80.3 ML/MIN/1.73
EOSINOPHIL # BLD AUTO: 0.11 10*3/MM3 (ref 0–0.4)
EOSINOPHIL NFR BLD AUTO: 2.3 % (ref 0.3–6.2)
ERYTHROCYTE [DISTWIDTH] IN BLOOD BY AUTOMATED COUNT: 12.2 % (ref 12.3–15.4)
GLOBULIN UR ELPH-MCNC: 2.8 GM/DL
GLUCOSE SERPL-MCNC: 107 MG/DL (ref 65–99)
GLUCOSE UR STRIP-MCNC: NEGATIVE MG/DL
HCT VFR BLD AUTO: 40 % (ref 34–46.6)
HGB BLD-MCNC: 13.8 G/DL (ref 12–15.9)
HGB UR QL STRIP.AUTO: NEGATIVE
IMM GRANULOCYTES # BLD AUTO: 0 10*3/MM3 (ref 0–0.05)
IMM GRANULOCYTES NFR BLD AUTO: 0 % (ref 0–0.5)
KETONES UR QL STRIP: NEGATIVE
LEUKOCYTE ESTERASE UR QL STRIP.AUTO: NEGATIVE
LYMPHOCYTES # BLD AUTO: 1.3 10*3/MM3 (ref 0.7–3.1)
LYMPHOCYTES NFR BLD AUTO: 26.6 % (ref 19.6–45.3)
MCH RBC QN AUTO: 31.9 PG (ref 26.6–33)
MCHC RBC AUTO-ENTMCNC: 34.5 G/DL (ref 31.5–35.7)
MCV RBC AUTO: 92.4 FL (ref 79–97)
MONOCYTES # BLD AUTO: 0.46 10*3/MM3 (ref 0.1–0.9)
MONOCYTES NFR BLD AUTO: 9.4 % (ref 5–12)
NEUTROPHILS NFR BLD AUTO: 2.99 10*3/MM3 (ref 1.7–7)
NEUTROPHILS NFR BLD AUTO: 61.3 % (ref 42.7–76)
NITRITE UR QL STRIP: NEGATIVE
PH UR STRIP.AUTO: 7.5 [PH] (ref 5–8)
PLATELET # BLD AUTO: 242 10*3/MM3 (ref 140–450)
PMV BLD AUTO: 9.9 FL (ref 6–12)
POTASSIUM SERPL-SCNC: 4.2 MMOL/L (ref 3.5–5.2)
PROT SERPL-MCNC: 6.9 G/DL (ref 6–8.5)
PROT UR QL STRIP: ABNORMAL
RBC # BLD AUTO: 4.33 10*6/MM3 (ref 3.77–5.28)
SODIUM SERPL-SCNC: 132 MMOL/L (ref 136–145)
SP GR UR STRIP: 1.01 (ref 1–1.03)
UROBILINOGEN UR QL STRIP: ABNORMAL
WBC NRBC COR # BLD AUTO: 4.88 10*3/MM3 (ref 3.4–10.8)

## 2023-11-29 PROCEDURE — 1160F RVW MEDS BY RX/DR IN RCRD: CPT | Performed by: OBSTETRICS & GYNECOLOGY

## 2023-11-29 PROCEDURE — 80053 COMPREHEN METABOLIC PANEL: CPT | Performed by: OBSTETRICS & GYNECOLOGY

## 2023-11-29 PROCEDURE — 25010000002 HEPARIN LOCK FLUSH PER 10 UNITS: Performed by: OBSTETRICS & GYNECOLOGY

## 2023-11-29 PROCEDURE — 3077F SYST BP >= 140 MM HG: CPT | Performed by: OBSTETRICS & GYNECOLOGY

## 2023-11-29 PROCEDURE — 86304 IMMUNOASSAY TUMOR CA 125: CPT | Performed by: OBSTETRICS & GYNECOLOGY

## 2023-11-29 PROCEDURE — 96413 CHEMO IV INFUSION 1 HR: CPT

## 2023-11-29 PROCEDURE — 25010000002 BEVACIZUMAB-BVZR 100 MG/4ML SOLUTION 4 ML VIAL: Performed by: OBSTETRICS & GYNECOLOGY

## 2023-11-29 PROCEDURE — 25010000002 BEVACIZUMAB-BVZR 400 MG/16ML SOLUTION 16 ML VIAL: Performed by: OBSTETRICS & GYNECOLOGY

## 2023-11-29 PROCEDURE — 81003 URINALYSIS AUTO W/O SCOPE: CPT | Performed by: OBSTETRICS & GYNECOLOGY

## 2023-11-29 PROCEDURE — 99213 OFFICE O/P EST LOW 20 MIN: CPT | Performed by: OBSTETRICS & GYNECOLOGY

## 2023-11-29 PROCEDURE — 1126F AMNT PAIN NOTED NONE PRSNT: CPT | Performed by: OBSTETRICS & GYNECOLOGY

## 2023-11-29 PROCEDURE — 3079F DIAST BP 80-89 MM HG: CPT | Performed by: OBSTETRICS & GYNECOLOGY

## 2023-11-29 PROCEDURE — 1159F MED LIST DOCD IN RCRD: CPT | Performed by: OBSTETRICS & GYNECOLOGY

## 2023-11-29 PROCEDURE — 85025 COMPLETE CBC W/AUTO DIFF WBC: CPT

## 2023-11-29 RX ORDER — HEPARIN SODIUM (PORCINE) LOCK FLUSH IV SOLN 100 UNIT/ML 100 UNIT/ML
500 SOLUTION INTRAVENOUS AS NEEDED
OUTPATIENT
Start: 2024-01-01

## 2023-11-29 RX ORDER — ENALAPRILAT 1.25 MG/ML
0.62 INJECTION INTRAVENOUS ONCE AS NEEDED
Status: DISCONTINUED | OUTPATIENT
Start: 2023-11-29 | End: 2023-11-30 | Stop reason: HOSPADM

## 2023-11-29 RX ORDER — HEPARIN SODIUM (PORCINE) LOCK FLUSH IV SOLN 100 UNIT/ML 100 UNIT/ML
500 SOLUTION INTRAVENOUS AS NEEDED
Status: DISCONTINUED | OUTPATIENT
Start: 2023-11-29 | End: 2023-11-30 | Stop reason: HOSPADM

## 2023-11-29 RX ORDER — SODIUM CHLORIDE 0.9 % (FLUSH) 0.9 %
10 SYRINGE (ML) INJECTION AS NEEDED
OUTPATIENT
Start: 2024-01-01

## 2023-11-29 RX ORDER — SODIUM CHLORIDE 9 MG/ML
250 INJECTION, SOLUTION INTRAVENOUS ONCE
Status: DISCONTINUED | OUTPATIENT
Start: 2023-11-29 | End: 2023-11-30 | Stop reason: HOSPADM

## 2023-11-29 RX ORDER — SODIUM CHLORIDE 0.9 % (FLUSH) 0.9 %
10 SYRINGE (ML) INJECTION AS NEEDED
Status: DISCONTINUED | OUTPATIENT
Start: 2023-11-29 | End: 2023-11-30 | Stop reason: HOSPADM

## 2023-11-29 RX ADMIN — HEPARIN 500 UNITS: 100 SYRINGE at 11:05

## 2023-11-29 RX ADMIN — SODIUM CHLORIDE 700 MG: 9 INJECTION, SOLUTION INTRAVENOUS at 10:24

## 2023-11-29 RX ADMIN — Medication 10 ML: at 11:05

## 2023-12-14 DIAGNOSIS — C56.3 OVARIAN CANCER, BILATERAL: Primary | ICD-10-CM

## 2023-12-14 RX ORDER — SODIUM CHLORIDE 9 MG/ML
250 INJECTION, SOLUTION INTRAVENOUS ONCE
OUTPATIENT
Start: 2023-12-20

## 2023-12-14 RX ORDER — ENALAPRILAT 1.25 MG/ML
0.62 INJECTION INTRAVENOUS ONCE AS NEEDED
Start: 2023-12-20

## 2023-12-19 NOTE — PROGRESS NOTES
Lyndsey [facility]  [id number]  1950    Reason for visit: History of ovarian adenocarcinoma s/p biopsy-proven lymph node recurrence in the chest now on bevacizumab maintenance     History of present illness:  The patient is a 73 y.o. year old female who presents today for treatment and evaluation of the above issues.    She presents today for cycle 12 of bevacizumab as maintenance therapy. Tolerating well with stable peripheral neuropathy.     Today, she notes good appetite. She reports normal bladder and bowel function. She reports good energy and is performing ADLs.  She notes no mucositis.  Other issues include HTN.     Oncologic History:  Oncology/Hematology History   Ovarian cancer, bilateral   6/22/2018 Initial Diagnosis    Abnormal examination by GI for patient c/o bloating and diarrhea, sent to GYN. TVUS concerning for malignancy. CT showed multiple cystic masses on bilateral ovaries, possible omental implants, and large volume ascites. Paracentesis performed and cytology returned positive for metastatic adenocarcinoma, favor GYN origin. CA-125 at diagnosis = 907.7. Referred to Gyn Oncology.      6/28/2018 Procedure    Port-a-cath placement     7/6/2018 - 8/16/2018 Chemotherapy    OP OVARIAN PACLitaxel / CARBOplatin (Q21D)  Neoadjuvant x 3 cycles     7/16/2018 -  Chemotherapy    OP CENTRAL VENOUS ACCESS DEVICE CARE AND MAINTENANCE     9/6/2018 Imaging    Interval CT showed improvement in ascites and resolution of previous left pleural effusion     9/11/2018 Surgery    Exploratory laparotomy, EMMA/BSO, debulking to R=0, and partial rectal resection. 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 Primary tumor site thought to be left fallopian tube. Bilateral ovarian, tubal, and surface involvement. Omentum, pelvic peritoneum, colon serosa, and rectal serosa involved. Macroscopic extrapelvic peritoneal implants also found. Stage IIIB grade 3         10/3/2018 - 11/29/2018 Chemotherapy     OP OVARIAN PACLitaxel / CARBOplatin AUC=6 / Bevacizumab 15 mg/kg  3 cycles adjuvant chemotherapy planned.     Patient chose to decline Avastin with all adjuvant cycles.     10/19/2018 Genetic Testing    Counseling and testing completed via CancerNext panel. 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 Patient does not have disease, but is a carrier.     1/3/2019 Imaging    Post-treatment CT chest, abdomen, and pelvis. No ascites, nodularity, or residual disease seen. No evidence of progression or active malignancy.      3/21/2019 Imaging    CT chest, abdomen, pelvis for diffuse abdominal pain. Study negative for recurrent disease     12/29/2020 Imaging    CT chest, abdomen, pelvis prior to port removal:  Stable examination with no CT evidence of acute intrathoracic, intra-abdominal or pelvic abnormality. No evidence of progression or metastatic disease.     9/10/2021 Imaging    CT chest, abdomen, pelvis:  Stable appearance of the chest abdomen and pelvis without acute pathology or evidence for occurrence or active metastatic disease     6/22/2022 Imaging    CT chest, abdomen, pelvis:  No evidence of metastatic disease within the abdomen or pelvis. Slowly enlarging bilateral pericardial fat pad lymph notes. Findings  are nonspecific given the slow progression since 2020, but are  concerning for metastatic disease or lymphoma.     8/22/2022 Progression    CT biopsy left pericardial lymph node. 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     9/20/2022 Molecular Testing    CARIS results:    PD-L1 positive  ER positive 2+, 75%  MSI stable, MMR proficient, MEGAN low, TMB low  PAMELA, BRCA 1/2, RAD51 C/D all negative  MD negative  Her2/Kike negative     12/13/2022 - 1/25/2023 Chemotherapy    OP OVARIAN PACLitaxel / CARBOplatin (Q21D)     2/22/2023 - 4/5/2023 Chemotherapy    OP OVARIAN PACLitaxel / CARBOplatin AUC=6  / Bevacizumab 15 mg/kg     4/26/2023 Imaging    CT C/A/P  Impression:  1.Decreased size of masses in the pericardial fat.  2.Decreased size of previously noted enlarged right external iliac lymph node.     4/26/2023 -  Chemotherapy    OP OVARIAN Bevacizumab 15 mg/kg (Maintenance)         Past Medical History:   Diagnosis Date    Hypertension     Ovarian cancer 2018    chemotherapy     Wears glasses      Past Surgical History:   Procedure Laterality Date    APPENDECTOMY      possibly with tubal (not certain)    BREAST BIOPSY Bilateral 1972    cysts removed     COLONOSCOPY      EXPLORATORY LAPAROTOMY, TOTAL ABDOMINAL HYSTERECTOMY SALPINGO OOPHORECTOMY N/A 09/11/2018    Procedure: LAPAROTOMY EXPLORATORY, TOTAL ABDOMINAL HYSTERECTOMY, BILATERAL SALPINGO OOPHORECTOMY, DEBULKING;  Surgeon: Shannan Bess MD;  Location: [facility];  Service: Gynecology    HERNIA REPAIR Right     inguinal     SUBLINGUAL SALIVARY CYST EXCISION Right 2020    at [facility]    TUBAL ABDOMINAL LIGATION       MEDICATIONS: The current medication list was reviewed with the patient and updated in the EMR this date per the Medical Assistant. Medication dosages and frequencies were confirmed to be accurate.      Allergies:  has No Known Allergies.    Social History:   Social History     Socioeconomic History    Marital status:    Tobacco Use    Smoking status: Never    Smokeless tobacco: Never   Vaping Use    Vaping Use: Never used   Substance and Sexual Activity    Alcohol use: No    Drug use: No    Sexual activity: Defer     Family History:    Family History   Adopted: Yes   Family history unknown: Yes     Health Maintenance:    Health Maintenance   Topic Date Due    Pneumococcal Vaccine 65+ (1 - PCV) Never done    TDAP/TD VACCINES (1 - Tdap) Never done    HEPATITIS C SCREENING  Never done    ANNUAL WELLNESS VISIT  Never done    LIPID PANEL  11/03/2021    ZOSTER VACCINE (2 of 2) 11/29/2022    DXA SCAN  03/22/2023    COVID-19 Vaccine (6 - 2023-24  "season) 11/21/2023    MAMMOGRAM  07/25/2025    COLORECTAL CANCER SCREENING  04/24/2028    INFLUENZA VACCINE  Completed     Review of Systems  Please refer to history of present illness, review of systems otherwise negative.    Vitals:    12/20/23 1131   BP: 168/82   Pulse: 71   Resp: 17   Temp: 97.5 °F (36.4 °C)   TempSrc: Temporal   SpO2: 97%   Weight: 47.6 kg (105 lb)   Height: 149.9 cm (59.02\")   PainSc: 0-No pain         Body mass index is 21.2 kg/m².  Wt Readings from Last 3 Encounters:   12/20/23 47.6 kg (105 lb)   11/29/23 48.8 kg (107 lb 9.6 oz)   11/08/23 48.5 kg (107 lb)     GENERAL: Alert, well-appearing female appearing her stated age who is in no apparent distress.   HEENT: Sclera anicteric. Head normocephalic, atraumatic.   NECK: No thyromegaly, supple  BREASTS: Deferred  CARDIOVASCULAR: Normal rate, regular rhythm.  No murmurs, rubs, gallops.  No peripheral edema.  RESPIRATORY: Lungs clear to auscultation bilaterally, normal respiratory effort on room air  BACK: Deferred  GASTROINTESTINAL: No tenderness, no distinct mass, no distention  SKIN:  Warm, dry, well-perfused.  All visible areas intact. Port in left side of chest accessed without issue  PSYCHIATRIC: AO x3, with appropriate affect, normal thought processes.  Mood and affect appropriate.  NEUROLOGIC: No focal deficits.  Moves extremities well.  MUSCULOSKELETAL: Normal gait and station.   EXTREMITIES:   No cyanosis, clubbing, symmetric. Bilateral LEs nontender  LYMPHATICS: No cervical adenopathy     PELVIC exam:    Deferred    ECOG PS 0    PROCEDURES: None    Diagnostic Data:     CT Chest With Contrast Diagnostic    Result Date: 11/27/2023  Impression: 1.Atelectasis or scarring right cardiophrenic angle. 2.Minimal coronary artery calcification. 3.Hepatic steatosis 4.Moderate stool burden which correlate to constipation 5.Small left internal iliac lymph nodes which have been suggested and appear stable 6.Grade 1 anterolisthesis of L5 on S1. It " looks like there probably are pars defects obscured by degenerative change . Electronically Signed: Zenon Stark MD  11/27/2023 4:09 PM EST  Workstation ID: [id number]    CT Abdomen Pelvis With Contrast    Result Date: 11/27/2023  Impression: 1.Atelectasis or scarring right cardiophrenic angle. 2.Minimal coronary artery calcification. 3.Hepatic steatosis 4.Moderate stool burden which correlate to constipation 5.Small left internal iliac lymph nodes which have been suggested and appear stable 6.Grade 1 anterolisthesis of L5 on S1. It looks like there probably are pars defects obscured by degenerative change . Electronically Signed: Zenon Stark MD  11/27/2023 4:09 PM EST  Workstation ID: [id number]     Lab Results   Component Value Date    WBC 6.83 12/20/2023    HGB 14.2 12/20/2023    HCT 41.8 12/20/2023    MCV 94.4 12/20/2023     12/20/2023    NEUTROABS 4.31 12/20/2023    GLUCOSE 95 12/20/2023    BUN 12 12/20/2023    CREATININE 0.93 12/20/2023    EGFRIFNONA 70 11/29/2018     12/20/2023    K 4.1 12/20/2023     12/20/2023    CO2 26.0 12/20/2023    MG 2.1 06/20/2018    PHOS 4.6 06/20/2018    CALCIUM 9.6 12/20/2023    ALBUMIN 4.1 12/20/2023    AST 21 12/20/2023    ALT 15 12/20/2023    BILITOT 0.4 12/20/2023     Lab Results   Component Value Date     14.7 11/29/2023     17.0 11/08/2023     17.3 11/01/2023     16.1 10/11/2023     17.3 09/20/2023     17.2 08/30/2023     15.8 08/09/2023     15.6 07/19/2023     15.6 06/28/2023     13.3 06/07/2023       3+ proteinuria, previous 24 hour urine acceptable    Assessment & Plan   This is a 73 y.o. 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    Encounter Diagnoses   Name Primary?    Ovarian cancer, bilateral Yes    Metastasis to mediastinal lymph node      Recurrent Ovarian Cancer, see detailed cancer history above.  -s/p 6 cycles paclitaxel/carboplatin/bevacizumab   -Continuing  bevacizumab as a maintenance drug until approximately 5/2024. Cycle 12 today.  -CT scans 7/19 show decrease in size of epicardiac and right external iliac chain lymph nodes to the point of not measuring by pathologic CT criteria  -CT 11/27: no concerning mediastinal/hilar lymphadenopathy, small stable internal ilac lymph nodes (largest 8.2 mm), hepatic steatosis; discussed with patient  -labs appropriate   -Vasotec given in infusion center due to continued hypertension, treatment completed    Chemotherapy-induced neuropathy  -mild at baseline since chemotherapy in 2018  -stable     Hypertension, bevacizumab toxicity  -On lisinopril 20mg daily, hydrochlorothiazide 25mg daily   -Continue Vasotec IV to treatment regimen 0.625 mg IV as needed for systolic blood pressure greater than 160    Proteinuria   -24-hour urine below threshold, okay to treat  -3+ on labs 11/1/2023, 2+ today 12/20  -Will consider 24-hour urine protein if UA reaches 3+ in the future    Routine Health Maintenance Screening  - see previous notes    FOLLOW UP: 3 weeks     Lizzie Porras MD   PGY2- OBGYN Resident   [facility]    I spent 25 minutes caring for Lyndsey on this date of service. This time includes time spent by me in the following activities: preparing for the visit, reviewing tests, performing a medically appropriate examination and/or evaluation, counseling and educating the patient/family/caregiver, ordering medications, tests, or procedures, referring and communicating with other health care professionals, and documenting information in the medical record    Patient was seen and examined with Dr. Porras,  resident, who performed portions of the examination and documentation for this patient's care under my direct supervision.  I agree with the above documentation and plan.    Shannan Bess MD  12/22/23  17:23 EST

## 2023-12-20 ENCOUNTER — OFFICE VISIT (OUTPATIENT)
Dept: GYNECOLOGIC ONCOLOGY | Facility: CLINIC | Age: 73
End: 2023-12-20
Payer: MEDICARE

## 2023-12-20 ENCOUNTER — HOSPITAL ENCOUNTER (OUTPATIENT)
Dept: ONCOLOGY | Facility: HOSPITAL | Age: 73
Discharge: HOME | End: 2023-12-20
Admitting: OBSTETRICS & GYNECOLOGY
Payer: MEDICARE

## 2023-12-20 VITALS
SYSTOLIC BLOOD PRESSURE: 168 MMHG | HEIGHT: 59 IN | TEMPERATURE: 97.5 F | DIASTOLIC BLOOD PRESSURE: 82 MMHG | BODY MASS INDEX: 21.17 KG/M2 | OXYGEN SATURATION: 97 % | WEIGHT: 105 LBS | HEART RATE: 71 BPM | RESPIRATION RATE: 17 BRPM

## 2023-12-20 VITALS — HEART RATE: 72 BPM | SYSTOLIC BLOOD PRESSURE: 142 MMHG | DIASTOLIC BLOOD PRESSURE: 68 MMHG

## 2023-12-20 DIAGNOSIS — C77.1 METASTASIS TO MEDIASTINAL LYMPH NODE: ICD-10-CM

## 2023-12-20 DIAGNOSIS — C56.3 OVARIAN CANCER, BILATERAL: Primary | ICD-10-CM

## 2023-12-20 LAB
ALBUMIN SERPL-MCNC: 4.1 G/DL (ref 3.5–5.2)
ALBUMIN/GLOB SERPL: 1.5 G/DL
ALP SERPL-CCNC: 17 U/L (ref 39–117)
ALT SERPL W P-5'-P-CCNC: 15 U/L (ref 1–33)
ANION GAP SERPL CALCULATED.3IONS-SCNC: 11 MMOL/L (ref 5–15)
AST SERPL-CCNC: 21 U/L (ref 1–32)
BASOPHILS # BLD AUTO: 0.02 10*3/MM3 (ref 0–0.2)
BASOPHILS NFR BLD AUTO: 0.3 % (ref 0–1.5)
BILIRUB SERPL-MCNC: 0.4 MG/DL (ref 0–1.2)
BILIRUB UR QL STRIP: NEGATIVE
BUN SERPL-MCNC: 12 MG/DL (ref 8–23)
BUN/CREAT SERPL: 12.9 (ref 7–25)
CALCIUM SPEC-SCNC: 9.6 MG/DL (ref 8.6–10.5)
CANCER AG125 SERPL QL: 15.2 U/ML (ref 0–38.1)
CHLORIDE SERPL-SCNC: 100 MMOL/L (ref 98–107)
CLARITY UR: CLEAR
CO2 SERPL-SCNC: 26 MMOL/L (ref 22–29)
COLOR UR: YELLOW
CREAT SERPL-MCNC: 0.93 MG/DL (ref 0.57–1)
DEPRECATED RDW RBC AUTO: 43.7 FL (ref 37–54)
EGFRCR SERPLBLD CKD-EPI 2021: 65 ML/MIN/1.73
EOSINOPHIL # BLD AUTO: 0.12 10*3/MM3 (ref 0–0.4)
EOSINOPHIL NFR BLD AUTO: 1.8 % (ref 0.3–6.2)
ERYTHROCYTE [DISTWIDTH] IN BLOOD BY AUTOMATED COUNT: 12.5 % (ref 12.3–15.4)
GLOBULIN UR ELPH-MCNC: 2.8 GM/DL
GLUCOSE SERPL-MCNC: 95 MG/DL (ref 65–99)
GLUCOSE UR STRIP-MCNC: NEGATIVE MG/DL
HCT VFR BLD AUTO: 41.8 % (ref 34–46.6)
HGB BLD-MCNC: 14.2 G/DL (ref 12–15.9)
HGB UR QL STRIP.AUTO: NEGATIVE
IMM GRANULOCYTES # BLD AUTO: 0 10*3/MM3 (ref 0–0.05)
IMM GRANULOCYTES NFR BLD AUTO: 0 % (ref 0–0.5)
KETONES UR QL STRIP: NEGATIVE
LEUKOCYTE ESTERASE UR QL STRIP.AUTO: NEGATIVE
LYMPHOCYTES # BLD AUTO: 1.86 10*3/MM3 (ref 0.7–3.1)
LYMPHOCYTES NFR BLD AUTO: 27.2 % (ref 19.6–45.3)
MCH RBC QN AUTO: 32.1 PG (ref 26.6–33)
MCHC RBC AUTO-ENTMCNC: 34 G/DL (ref 31.5–35.7)
MCV RBC AUTO: 94.4 FL (ref 79–97)
MONOCYTES # BLD AUTO: 0.52 10*3/MM3 (ref 0.1–0.9)
MONOCYTES NFR BLD AUTO: 7.6 % (ref 5–12)
NEUTROPHILS NFR BLD AUTO: 4.31 10*3/MM3 (ref 1.7–7)
NEUTROPHILS NFR BLD AUTO: 63.1 % (ref 42.7–76)
NITRITE UR QL STRIP: NEGATIVE
PH UR STRIP.AUTO: 6.5 [PH] (ref 5–8)
PLATELET # BLD AUTO: 235 10*3/MM3 (ref 140–450)
PMV BLD AUTO: 9.8 FL (ref 6–12)
POTASSIUM SERPL-SCNC: 4.1 MMOL/L (ref 3.5–5.2)
PROT SERPL-MCNC: 6.9 G/DL (ref 6–8.5)
PROT UR QL STRIP: ABNORMAL
RBC # BLD AUTO: 4.43 10*6/MM3 (ref 3.77–5.28)
SODIUM SERPL-SCNC: 137 MMOL/L (ref 136–145)
SP GR UR STRIP: 1.01 (ref 1–1.03)
UROBILINOGEN UR QL STRIP: ABNORMAL
WBC NRBC COR # BLD AUTO: 6.83 10*3/MM3 (ref 3.4–10.8)

## 2023-12-20 PROCEDURE — 3077F SYST BP >= 140 MM HG: CPT | Performed by: OBSTETRICS & GYNECOLOGY

## 2023-12-20 PROCEDURE — 96365 THER/PROPH/DIAG IV INF INIT: CPT

## 2023-12-20 PROCEDURE — 99213 OFFICE O/P EST LOW 20 MIN: CPT | Performed by: OBSTETRICS & GYNECOLOGY

## 2023-12-20 PROCEDURE — 25010000002 HEPARIN LOCK FLUSH PER 10 UNITS: Performed by: OBSTETRICS & GYNECOLOGY

## 2023-12-20 PROCEDURE — 96413 CHEMO IV INFUSION 1 HR: CPT

## 2023-12-20 PROCEDURE — 1126F AMNT PAIN NOTED NONE PRSNT: CPT | Performed by: OBSTETRICS & GYNECOLOGY

## 2023-12-20 PROCEDURE — 86304 IMMUNOASSAY TUMOR CA 125: CPT | Performed by: OBSTETRICS & GYNECOLOGY

## 2023-12-20 PROCEDURE — 3079F DIAST BP 80-89 MM HG: CPT | Performed by: OBSTETRICS & GYNECOLOGY

## 2023-12-20 PROCEDURE — 25010000002 BEVACIZUMAB-BVZR 400 MG/16ML SOLUTION 16 ML VIAL: Performed by: OBSTETRICS & GYNECOLOGY

## 2023-12-20 PROCEDURE — 81003 URINALYSIS AUTO W/O SCOPE: CPT | Performed by: OBSTETRICS & GYNECOLOGY

## 2023-12-20 PROCEDURE — 1159F MED LIST DOCD IN RCRD: CPT | Performed by: OBSTETRICS & GYNECOLOGY

## 2023-12-20 PROCEDURE — 1160F RVW MEDS BY RX/DR IN RCRD: CPT | Performed by: OBSTETRICS & GYNECOLOGY

## 2023-12-20 PROCEDURE — 25010000002 BEVACIZUMAB-BVZR 100 MG/4ML SOLUTION 4 ML VIAL: Performed by: OBSTETRICS & GYNECOLOGY

## 2023-12-20 PROCEDURE — 85025 COMPLETE CBC W/AUTO DIFF WBC: CPT

## 2023-12-20 PROCEDURE — 80053 COMPREHEN METABOLIC PANEL: CPT | Performed by: OBSTETRICS & GYNECOLOGY

## 2023-12-20 RX ORDER — HEPARIN SODIUM (PORCINE) LOCK FLUSH IV SOLN 100 UNIT/ML 100 UNIT/ML
500 SOLUTION INTRAVENOUS AS NEEDED
OUTPATIENT
Start: 2024-01-01

## 2023-12-20 RX ORDER — HEPARIN SODIUM (PORCINE) LOCK FLUSH IV SOLN 100 UNIT/ML 100 UNIT/ML
500 SOLUTION INTRAVENOUS AS NEEDED
Status: DISCONTINUED | OUTPATIENT
Start: 2023-12-20 | End: 2023-12-21 | Stop reason: HOSPADM

## 2023-12-20 RX ORDER — SODIUM CHLORIDE 0.9 % (FLUSH) 0.9 %
10 SYRINGE (ML) INJECTION AS NEEDED
Status: DISCONTINUED | OUTPATIENT
Start: 2023-12-20 | End: 2023-12-21 | Stop reason: HOSPADM

## 2023-12-20 RX ORDER — SODIUM CHLORIDE 0.9 % (FLUSH) 0.9 %
10 SYRINGE (ML) INJECTION AS NEEDED
OUTPATIENT
Start: 2024-01-01

## 2023-12-20 RX ADMIN — Medication 10 ML: at 14:11

## 2023-12-20 RX ADMIN — SODIUM CHLORIDE 700 MG: 9 INJECTION, SOLUTION INTRAVENOUS at 13:33

## 2023-12-20 RX ADMIN — HEPARIN 500 UNITS: 100 SYRINGE at 14:11

## 2024-01-09 DIAGNOSIS — C56.3 OVARIAN CANCER, BILATERAL: Primary | ICD-10-CM

## 2024-01-09 NOTE — PROGRESS NOTES
Lyndsey [facility]  [id number]  1950    Reason for visit: History of ovarian adenocarcinoma s/p biopsy-proven lymph node recurrence in the chest now on bevacizumab maintenance     History of present illness:  The patient is a 73 y.o. year old female who presents today for treatment and evaluation of the above issues.    She presents today for cycle 13 of bevacizumab as maintenance therapy. Tolerating well with stable peripheral neuropathy.     Today, she notes good appetite. She reports normal bladder and bowel function. She reports good energy and is performing ADLs.  She notes no mucositis.  Other issues include HTN.     Oncologic History:  Oncology/Hematology History   Ovarian cancer, bilateral   6/22/2018 Initial Diagnosis    Abnormal examination by GI for patient c/o bloating and diarrhea, sent to GYN. TVUS concerning for malignancy. CT showed multiple cystic masses on bilateral ovaries, possible omental implants, and large volume ascites. Paracentesis performed and cytology returned positive for metastatic adenocarcinoma, favor GYN origin. CA-125 at diagnosis = 907.7. Referred to Gyn Oncology.      6/28/2018 Procedure    Port-a-cath placement     7/6/2018 - 8/16/2018 Chemotherapy    OP OVARIAN PACLitaxel / CARBOplatin (Q21D)  Neoadjuvant x 3 cycles     7/16/2018 -  Chemotherapy    OP CENTRAL VENOUS ACCESS DEVICE CARE AND MAINTENANCE     9/6/2018 Imaging    Interval CT showed improvement in ascites and resolution of previous left pleural effusion     9/11/2018 Surgery    Exploratory laparotomy, EMMA/BSO, debulking to R=0, and partial rectal resection. 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 Primary tumor site thought to be left fallopian tube. Bilateral ovarian, tubal, and surface involvement. Omentum, pelvic peritoneum, colon serosa, and rectal serosa involved. Macroscopic extrapelvic peritoneal implants also found. Stage IIIB grade 3         10/3/2018 - 11/29/2018 Chemotherapy     OP OVARIAN PACLitaxel / CARBOplatin AUC=6 / Bevacizumab 15 mg/kg  3 cycles adjuvant chemotherapy planned.     Patient chose to decline Avastin with all adjuvant cycles.     10/19/2018 Genetic Testing    Counseling and testing completed via CancerNext panel. 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 Patient does not have disease, but is a carrier.     1/3/2019 Imaging    Post-treatment CT chest, abdomen, and pelvis. No ascites, nodularity, or residual disease seen. No evidence of progression or active malignancy.      3/21/2019 Imaging    CT chest, abdomen, pelvis for diffuse abdominal pain. Study negative for recurrent disease     12/29/2020 Imaging    CT chest, abdomen, pelvis prior to port removal:  Stable examination with no CT evidence of acute intrathoracic, intra-abdominal or pelvic abnormality. No evidence of progression or metastatic disease.     9/10/2021 Imaging    CT chest, abdomen, pelvis:  Stable appearance of the chest abdomen and pelvis without acute pathology or evidence for occurrence or active metastatic disease     6/22/2022 Imaging    CT chest, abdomen, pelvis:  No evidence of metastatic disease within the abdomen or pelvis. Slowly enlarging bilateral pericardial fat pad lymph notes. Findings  are nonspecific given the slow progression since 2020, but are  concerning for metastatic disease or lymphoma.     8/22/2022 Progression    CT biopsy left pericardial lymph node. 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     9/20/2022 Molecular Testing    CARIS results:    PD-L1 positive  ER positive 2+, 75%  MSI stable, MMR proficient, MEGAN low, TMB low  PAMELA, BRCA 1/2, RAD51 C/D all negative  [place] negative  Her2/Kike negative     12/13/2022 - 1/25/2023 Chemotherapy    OP OVARIAN PACLitaxel / CARBOplatin (Q21D)     2/22/2023 - 4/5/2023 Chemotherapy    OP OVARIAN PACLitaxel / CARBOplatin AUC=6  / Bevacizumab 15 mg/kg     4/26/2023 Imaging    CT C/A/P  Impression:  1.Decreased size of masses in the pericardial fat.  2.Decreased size of previously noted enlarged right external iliac lymph node.     4/26/2023 -  Chemotherapy    OP OVARIAN Bevacizumab 15 mg/kg (Maintenance)       1/10/2024 -  Chemotherapy    OP OVARIAN Bevacizumab 15 mg/kg (Maintenance)       Past Medical History:   Diagnosis Date    Hypertension     Ovarian cancer 2018    chemotherapy     Wears glasses      Past Surgical History:   Procedure Laterality Date    APPENDECTOMY      possibly with tubal (not certain)    BREAST BIOPSY Bilateral 1972    cysts removed     COLONOSCOPY      EXPLORATORY LAPAROTOMY, TOTAL ABDOMINAL HYSTERECTOMY SALPINGO OOPHORECTOMY N/A 09/11/2018    Procedure: LAPAROTOMY EXPLORATORY, TOTAL ABDOMINAL HYSTERECTOMY, BILATERAL SALPINGO OOPHORECTOMY, DEBULKING;  Surgeon: Shannan Bess MD;  Location: [facility];  Service: Gynecology    HERNIA REPAIR Right     inguinal     SUBLINGUAL SALIVARY CYST EXCISION Right 2020    at [facility]    TUBAL ABDOMINAL LIGATION       MEDICATIONS: The current medication list was reviewed with the patient and updated in the EMR this date per the Medical Assistant. Medication dosages and frequencies were confirmed to be accurate.      Allergies:  has No Known Allergies.    Social History:   Social History     Socioeconomic History    Marital status:    Tobacco Use    Smoking status: Never    Smokeless tobacco: Never   Vaping Use    Vaping Use: Never used   Substance and Sexual Activity    Alcohol use: No    Drug use: No    Sexual activity: Defer     Family History:    Family History   Adopted: Yes   Family history unknown: Yes     Health Maintenance:    Health Maintenance   Topic Date Due    Pneumococcal Vaccine 65+ (1 of 2 - PCV) Never done    TDAP/TD VACCINES (1 - Tdap) Never done    HEPATITIS C SCREENING  Never done    ANNUAL WELLNESS VISIT  Never done    LIPID PANEL  11/03/2021    ZOSTER  "VACCINE (2 of 2) 11/29/2022    DXA SCAN  03/22/2023    COVID-19 Vaccine (6 - 2023-24 season) 11/21/2023    MAMMOGRAM  07/25/2025    COLORECTAL CANCER SCREENING  04/24/2028    INFLUENZA VACCINE  Completed     Review of Systems  Please refer to history of present illness, review of systems otherwise negative.    Vitals:    01/10/24 1030 01/10/24 1048   BP: 160/82 150/85   Pulse: 61    Resp: 18    Temp: 97.5 °F (36.4 °C)    TempSrc: Temporal    SpO2: 100%    Weight: 48.4 kg (106 lb 11.2 oz)    Height: 149.9 cm (59.02\")    PainSc: 0-No pain            Body mass index is 21.54 kg/m².  Wt Readings from Last 3 Encounters:   01/10/24 48.4 kg (106 lb 11.2 oz)   12/20/23 47.6 kg (105 lb)   11/29/23 48.8 kg (107 lb 9.6 oz)     GENERAL: Alert, well-appearing female appearing her stated age who is in no apparent distress.   HEENT: Sclera anicteric. Head normocephalic, atraumatic.   NECK: No thyromegaly, supple  BREASTS: Deferred  CARDIOVASCULAR: Normal rate, regular rhythm.  No murmurs, rubs, gallops.  No peripheral edema.  RESPIRATORY: Lungs clear to auscultation bilaterally, normal respiratory effort on room air  BACK: Deferred  GASTROINTESTINAL: No tenderness, no distinct mass, no distention  SKIN:  Warm, dry, well-perfused.  All visible areas intact. Port in left side of chest accessed without issue  PSYCHIATRIC: AO x3, with appropriate affect, normal thought processes.  Mood and affect appropriate.  NEUROLOGIC: No focal deficits.  Moves extremities well.  MUSCULOSKELETAL: Normal gait and station.   EXTREMITIES:   No cyanosis, clubbing, symmetric. Bilateral LEs nontender  LYMPHATICS: No cervical adenopathy     PELVIC exam:    Deferred    ECOG PS 0    PROCEDURES: None    Diagnostic Data:     No radiology results for the last 30 days.    Lab Results   Component Value Date    WBC 6.98 01/10/2024    HGB 14.1 01/10/2024    HCT 41.8 01/10/2024    MCV 93.7 01/10/2024     01/10/2024    NEUTROABS 4.53 01/10/2024    GLUCOSE 95 " 12/20/2023    BUN 12 12/20/2023    CREATININE 0.93 12/20/2023    EGFRIFNONA 70 11/29/2018     12/20/2023    K 4.1 12/20/2023     12/20/2023    CO2 26.0 12/20/2023    MG 2.1 06/20/2018    PHOS 4.6 06/20/2018    CALCIUM 9.6 12/20/2023    ALBUMIN 4.1 12/20/2023    AST 21 12/20/2023    ALT 15 12/20/2023    BILITOT 0.4 12/20/2023     Lab Results   Component Value Date     15.2 12/20/2023     14.7 11/29/2023     17.0 11/08/2023     17.3 11/01/2023     16.1 10/11/2023     17.3 09/20/2023     17.2 08/30/2023     15.8 08/09/2023     15.6 07/19/2023     15.6 06/28/2023           Assessment & Plan   This is a 73 y.o. 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    Encounter Diagnoses   Name Primary?    Ovarian cancer, bilateral Yes    Metastasis to mediastinal lymph node        Recurrent Ovarian Cancer, see detailed cancer history above.  -s/p 6 cycles paclitaxel/carboplatin/bevacizumab   -Continuing bevacizumab as a maintenance drug until approximately March to April 2024 (1 year after completion of cytotoxic chemotherapy which was 4/20/2023)  -CT scans 7/19 show decrease in size of epicardiac and right external iliac chain lymph nodes to the point of not measuring by pathologic CT criteria  -CT 11/27: no concerning mediastinal/hilar lymphadenopathy, small stable internal ilac lymph nodes (largest 8.2 mm), hepatic steatosis; discussed with patient  -labs appropriate      Chemotherapy-induced neuropathy  -mild at baseline since chemotherapy in 2018  -stable     Hypertension, bevacizumab toxicity  -On lisinopril 20mg daily, hydrochlorothiazide 25mg daily   -Continue Vasotec IV to treatment regimen 0.625 mg IV as needed for systolic blood pressure greater than 160 per protocol    Proteinuria   -24-hour urine below threshold, okay to treat  -3+ on labs 11/1/2023, 2+ today 1/10  -Will consider 24-hour urine protein if UA reaches 3+ in the  future    Routine Health Maintenance Screening  - see previous notes    FOLLOW UP: 3 weeks     Mecca Hutton MD   Resident Physician, PGY-3  Gynecologic Oncology    I spent 21 minutes caring for Lyndsey on this date of service. This time includes time spent by me in the following activities: preparing for the visit, reviewing tests, performing a medically appropriate examination and/or evaluation, counseling and educating the patient/family/caregiver, ordering medications, tests, or procedures, referring and communicating with other health care professionals, documenting information in the medical record, and care coordination    Patient was seen and examined with Dr. Hutton,  resident, who performed portions of the examination and documentation for this patient's care under my direct supervision.  I agree with the above documentation and plan.    Shannan Bess MD  01/10/24  11:03 EST

## 2024-01-10 ENCOUNTER — HOSPITAL ENCOUNTER (OUTPATIENT)
Dept: ONCOLOGY | Facility: HOSPITAL | Age: 74
Discharge: HOME | End: 2024-01-10
Payer: MEDICARE

## 2024-01-10 ENCOUNTER — OFFICE VISIT (OUTPATIENT)
Dept: GYNECOLOGIC ONCOLOGY | Facility: CLINIC | Age: 74
End: 2024-01-10
Payer: MEDICARE

## 2024-01-10 VITALS
TEMPERATURE: 97.5 F | HEIGHT: 59 IN | HEART RATE: 61 BPM | RESPIRATION RATE: 18 BRPM | WEIGHT: 106.7 LBS | SYSTOLIC BLOOD PRESSURE: 150 MMHG | OXYGEN SATURATION: 100 % | DIASTOLIC BLOOD PRESSURE: 85 MMHG | BODY MASS INDEX: 21.51 KG/M2

## 2024-01-10 DIAGNOSIS — C56.3 OVARIAN CANCER, BILATERAL: Primary | ICD-10-CM

## 2024-01-10 DIAGNOSIS — C77.1 METASTASIS TO MEDIASTINAL LYMPH NODE: ICD-10-CM

## 2024-01-10 LAB
ALBUMIN SERPL-MCNC: 4.1 G/DL (ref 3.5–5.2)
ALBUMIN/GLOB SERPL: 1.5 G/DL
ALP SERPL-CCNC: 15 U/L (ref 39–117)
ALT SERPL W P-5'-P-CCNC: 11 U/L (ref 1–33)
ANION GAP SERPL CALCULATED.3IONS-SCNC: 9 MMOL/L (ref 5–15)
AST SERPL-CCNC: 22 U/L (ref 1–32)
BASOPHILS # BLD AUTO: 0.03 10*3/MM3 (ref 0–0.2)
BASOPHILS NFR BLD AUTO: 0.4 % (ref 0–1.5)
BILIRUB SERPL-MCNC: 0.4 MG/DL (ref 0–1.2)
BILIRUB UR QL STRIP: NEGATIVE
BUN SERPL-MCNC: 16 MG/DL (ref 8–23)
BUN/CREAT SERPL: 16.7 (ref 7–25)
CALCIUM SPEC-SCNC: 9.8 MG/DL (ref 8.6–10.5)
CANCER AG125 SERPL QL: 15.3 U/ML (ref 0–38.1)
CHLORIDE SERPL-SCNC: 97 MMOL/L (ref 98–107)
CLARITY UR: CLEAR
CO2 SERPL-SCNC: 29 MMOL/L (ref 22–29)
COLOR UR: YELLOW
CREAT SERPL-MCNC: 0.96 MG/DL (ref 0.57–1)
DEPRECATED RDW RBC AUTO: 43.7 FL (ref 37–54)
EGFRCR SERPLBLD CKD-EPI 2021: 62.6 ML/MIN/1.73
EOSINOPHIL # BLD AUTO: 0.15 10*3/MM3 (ref 0–0.4)
EOSINOPHIL NFR BLD AUTO: 2.1 % (ref 0.3–6.2)
ERYTHROCYTE [DISTWIDTH] IN BLOOD BY AUTOMATED COUNT: 12.5 % (ref 12.3–15.4)
GLOBULIN UR ELPH-MCNC: 2.8 GM/DL
GLUCOSE SERPL-MCNC: 91 MG/DL (ref 65–99)
GLUCOSE UR STRIP-MCNC: NEGATIVE MG/DL
HCT VFR BLD AUTO: 41.8 % (ref 34–46.6)
HGB BLD-MCNC: 14.1 G/DL (ref 12–15.9)
HGB UR QL STRIP.AUTO: NEGATIVE
IMM GRANULOCYTES # BLD AUTO: 0 10*3/MM3 (ref 0–0.05)
IMM GRANULOCYTES NFR BLD AUTO: 0 % (ref 0–0.5)
KETONES UR QL STRIP: NEGATIVE
LEUKOCYTE ESTERASE UR QL STRIP.AUTO: NEGATIVE
LYMPHOCYTES # BLD AUTO: 1.73 10*3/MM3 (ref 0.7–3.1)
LYMPHOCYTES NFR BLD AUTO: 24.8 % (ref 19.6–45.3)
MCH RBC QN AUTO: 31.6 PG (ref 26.6–33)
MCHC RBC AUTO-ENTMCNC: 33.7 G/DL (ref 31.5–35.7)
MCV RBC AUTO: 93.7 FL (ref 79–97)
MONOCYTES # BLD AUTO: 0.54 10*3/MM3 (ref 0.1–0.9)
MONOCYTES NFR BLD AUTO: 7.7 % (ref 5–12)
NEUTROPHILS NFR BLD AUTO: 4.53 10*3/MM3 (ref 1.7–7)
NEUTROPHILS NFR BLD AUTO: 65 % (ref 42.7–76)
NITRITE UR QL STRIP: NEGATIVE
PH UR STRIP.AUTO: 6.5 [PH] (ref 5–8)
PLATELET # BLD AUTO: 255 10*3/MM3 (ref 140–450)
PMV BLD AUTO: 10.1 FL (ref 6–12)
POTASSIUM SERPL-SCNC: 4.1 MMOL/L (ref 3.5–5.2)
PROT SERPL-MCNC: 6.9 G/DL (ref 6–8.5)
PROT UR QL STRIP: ABNORMAL
RBC # BLD AUTO: 4.46 10*6/MM3 (ref 3.77–5.28)
SODIUM SERPL-SCNC: 135 MMOL/L (ref 136–145)
SP GR UR STRIP: 1.01 (ref 1–1.03)
UROBILINOGEN UR QL STRIP: ABNORMAL
WBC NRBC COR # BLD AUTO: 6.98 10*3/MM3 (ref 3.4–10.8)

## 2024-01-10 PROCEDURE — 85025 COMPLETE CBC W/AUTO DIFF WBC: CPT | Performed by: OBSTETRICS & GYNECOLOGY

## 2024-01-10 PROCEDURE — 1159F MED LIST DOCD IN RCRD: CPT | Performed by: OBSTETRICS & GYNECOLOGY

## 2024-01-10 PROCEDURE — 80053 COMPREHEN METABOLIC PANEL: CPT | Performed by: OBSTETRICS & GYNECOLOGY

## 2024-01-10 PROCEDURE — 3077F SYST BP >= 140 MM HG: CPT | Performed by: OBSTETRICS & GYNECOLOGY

## 2024-01-10 PROCEDURE — 25010000002 BEVACIZUMAB-BVZR 100 MG/4ML SOLUTION 4 ML VIAL: Performed by: OBSTETRICS & GYNECOLOGY

## 2024-01-10 PROCEDURE — 25010000002 BEVACIZUMAB-BVZR 400 MG/16ML SOLUTION 16 ML VIAL: Performed by: OBSTETRICS & GYNECOLOGY

## 2024-01-10 PROCEDURE — 25810000003 SODIUM CHLORIDE 0.9 % SOLUTION: Performed by: OBSTETRICS & GYNECOLOGY

## 2024-01-10 PROCEDURE — 96413 CHEMO IV INFUSION 1 HR: CPT

## 2024-01-10 PROCEDURE — 1126F AMNT PAIN NOTED NONE PRSNT: CPT | Performed by: OBSTETRICS & GYNECOLOGY

## 2024-01-10 PROCEDURE — 81003 URINALYSIS AUTO W/O SCOPE: CPT | Performed by: OBSTETRICS & GYNECOLOGY

## 2024-01-10 PROCEDURE — 3079F DIAST BP 80-89 MM HG: CPT | Performed by: OBSTETRICS & GYNECOLOGY

## 2024-01-10 PROCEDURE — 1160F RVW MEDS BY RX/DR IN RCRD: CPT | Performed by: OBSTETRICS & GYNECOLOGY

## 2024-01-10 PROCEDURE — 25010000002 HEPARIN LOCK FLUSH PER 10 UNITS: Performed by: OBSTETRICS & GYNECOLOGY

## 2024-01-10 PROCEDURE — 99213 OFFICE O/P EST LOW 20 MIN: CPT | Performed by: OBSTETRICS & GYNECOLOGY

## 2024-01-10 PROCEDURE — 86304 IMMUNOASSAY TUMOR CA 125: CPT | Performed by: OBSTETRICS & GYNECOLOGY

## 2024-01-10 RX ORDER — SODIUM CHLORIDE 0.9 % (FLUSH) 0.9 %
10 SYRINGE (ML) INJECTION AS NEEDED
Status: DISCONTINUED | OUTPATIENT
Start: 2024-01-10 | End: 2024-01-11 | Stop reason: HOSPADM

## 2024-01-10 RX ORDER — ENALAPRILAT 1.25 MG/ML
0.62 INJECTION INTRAVENOUS EVERY 6 HOURS PRN
Qty: 0.5 ML | Refills: 0 | Status: SHIPPED | OUTPATIENT
Start: 2024-01-10

## 2024-01-10 RX ORDER — ONDANSETRON HYDROCHLORIDE 8 MG/1
8 TABLET, FILM COATED ORAL 3 TIMES DAILY PRN
Qty: 30 TABLET | Refills: 5 | Status: SHIPPED | OUTPATIENT
Start: 2024-01-10

## 2024-01-10 RX ORDER — SODIUM CHLORIDE 9 MG/ML
20 INJECTION, SOLUTION INTRAVENOUS ONCE
Status: CANCELLED | OUTPATIENT
Start: 2024-01-10

## 2024-01-10 RX ORDER — SODIUM CHLORIDE 0.9 % (FLUSH) 0.9 %
10 SYRINGE (ML) INJECTION AS NEEDED
OUTPATIENT
Start: 2024-04-01

## 2024-01-10 RX ORDER — HEPARIN SODIUM (PORCINE) LOCK FLUSH IV SOLN 100 UNIT/ML 100 UNIT/ML
500 SOLUTION INTRAVENOUS AS NEEDED
Status: DISCONTINUED | OUTPATIENT
Start: 2024-01-10 | End: 2024-01-11 | Stop reason: HOSPADM

## 2024-01-10 RX ORDER — SODIUM CHLORIDE 9 MG/ML
20 INJECTION, SOLUTION INTRAVENOUS ONCE
Status: COMPLETED | OUTPATIENT
Start: 2024-01-10 | End: 2024-01-10

## 2024-01-10 RX ORDER — HEPARIN SODIUM (PORCINE) LOCK FLUSH IV SOLN 100 UNIT/ML 100 UNIT/ML
500 SOLUTION INTRAVENOUS AS NEEDED
OUTPATIENT
Start: 2024-04-01

## 2024-01-10 RX ADMIN — SODIUM CHLORIDE 20 ML/HR: 9 INJECTION, SOLUTION INTRAVENOUS at 11:58

## 2024-01-10 RX ADMIN — HEPARIN 500 UNITS: 100 SYRINGE at 12:47

## 2024-01-10 RX ADMIN — Medication 10 ML: at 12:47

## 2024-01-10 RX ADMIN — SODIUM CHLORIDE 700 MG: 9 INJECTION, SOLUTION INTRAVENOUS at 11:59

## 2024-01-23 DIAGNOSIS — C56.3 OVARIAN CANCER, BILATERAL: Primary | ICD-10-CM

## 2024-01-29 NOTE — PROGRESS NOTES
Lyndsey [facility]  [id number]  1950    Reason for visit: History of ovarian adenocarcinoma s/p biopsy-proven lymph node recurrence in the chest now on bevacizumab maintenance     History of present illness:  The patient is a 73 y.o. year old female who presents today for treatment and evaluation of the above issues.    She presents today for cycle 14 of bevacizumab as maintenance therapy. Tolerating well with stable peripheral neuropathy.     Today, she notes good appetite. She reports normal bladder and bowel function. She reports good energy and is performing ADLs.  She notes no mucositis.  Other issues include HTN.     Oncologic History:  Oncology/Hematology History   Ovarian cancer, bilateral   6/22/2018 Initial Diagnosis    Abnormal examination by GI for patient c/o bloating and diarrhea, sent to GYN. TVUS concerning for malignancy. CT showed multiple cystic masses on bilateral ovaries, possible omental implants, and large volume ascites. Paracentesis performed and cytology returned positive for metastatic adenocarcinoma, favor GYN origin. CA-125 at diagnosis = 907.7. Referred to Gyn Oncology.      6/28/2018 Procedure    Port-a-cath placement     7/6/2018 - 8/16/2018 Chemotherapy    OP OVARIAN PACLitaxel / CARBOplatin (Q21D)  Neoadjuvant x 3 cycles     7/16/2018 -  Chemotherapy    OP CENTRAL VENOUS ACCESS DEVICE CARE AND MAINTENANCE     9/6/2018 Imaging    Interval CT showed improvement in ascites and resolution of previous left pleural effusion     9/11/2018 Surgery    Exploratory laparotomy, EMMA/BSO, debulking to R=0, and partial rectal resection. 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 Primary tumor site thought to be left fallopian tube. Bilateral ovarian, tubal, and surface involvement. Omentum, pelvic peritoneum, colon serosa, and rectal serosa involved. Macroscopic extrapelvic peritoneal implants also found. Stage IIIB grade 3         10/3/2018 - 11/29/2018 Chemotherapy     OP OVARIAN PACLitaxel / CARBOplatin AUC=6 / Bevacizumab 15 mg/kg  3 cycles adjuvant chemotherapy planned.     Patient chose to decline Avastin with all adjuvant cycles.     10/19/2018 Genetic Testing    Counseling and testing completed via CancerNext panel. 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 Patient does not have disease, but is a carrier.     1/3/2019 Imaging    Post-treatment CT chest, abdomen, and pelvis. No ascites, nodularity, or residual disease seen. No evidence of progression or active malignancy.      3/21/2019 Imaging    CT chest, abdomen, pelvis for diffuse abdominal pain. Study negative for recurrent disease     12/29/2020 Imaging    CT chest, abdomen, pelvis prior to port removal:  Stable examination with no CT evidence of acute intrathoracic, intra-abdominal or pelvic abnormality. No evidence of progression or metastatic disease.     9/10/2021 Imaging    CT chest, abdomen, pelvis:  Stable appearance of the chest abdomen and pelvis without acute pathology or evidence for occurrence or active metastatic disease     6/22/2022 Imaging    CT chest, abdomen, pelvis:  No evidence of metastatic disease within the abdomen or pelvis. Slowly enlarging bilateral pericardial fat pad lymph notes. Findings  are nonspecific given the slow progression since 2020, but are  concerning for metastatic disease or lymphoma.     8/22/2022 Progression    CT biopsy left pericardial lymph node. 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     9/20/2022 Molecular Testing    CARIS results:    PD-L1 positive  ER positive 2+, 75%  MSI stable, MMR proficient, MEGAN low, TMB low  PAMELA, BRCA 1/2, RAD51 C/D all negative  AL negative  Her2/Kike negative     12/13/2022 - 1/25/2023 Chemotherapy    OP OVARIAN PACLitaxel / CARBOplatin (Q21D)     2/22/2023 - 4/5/2023 Chemotherapy    OP OVARIAN PACLitaxel / CARBOplatin AUC=6  / Bevacizumab 15 mg/kg     4/26/2023 Imaging    CT C/A/P  Impression:  1.Decreased size of masses in the pericardial fat.  2.Decreased size of previously noted enlarged right external iliac lymph node.     4/26/2023 -  Chemotherapy    OP OVARIAN Bevacizumab 15 mg/kg (Maintenance)       1/10/2024 -  Chemotherapy    OP OVARIAN Bevacizumab 15 mg/kg (Maintenance)       Past Medical History:   Diagnosis Date    Hypertension     Ovarian cancer 2018    chemotherapy     Wears glasses      Past Surgical History:   Procedure Laterality Date    APPENDECTOMY      possibly with tubal (not certain)    BREAST BIOPSY Bilateral 1972    cysts removed     COLONOSCOPY      EXPLORATORY LAPAROTOMY, TOTAL ABDOMINAL HYSTERECTOMY SALPINGO OOPHORECTOMY N/A 09/11/2018    Procedure: LAPAROTOMY EXPLORATORY, TOTAL ABDOMINAL HYSTERECTOMY, BILATERAL SALPINGO OOPHORECTOMY, DEBULKING;  Surgeon: Shannan Bess MD;  Location: [facility];  Service: Gynecology    HERNIA REPAIR Right     inguinal     SUBLINGUAL SALIVARY CYST EXCISION Right 2020    at [facility]    TUBAL ABDOMINAL LIGATION       MEDICATIONS: The current medication list was reviewed with the patient and updated in the EMR this date per the Medical Assistant. Medication dosages and frequencies were confirmed to be accurate.      Allergies:  has No Known Allergies.    Social History:   Social History     Socioeconomic History    Marital status:    Tobacco Use    Smoking status: Never    Smokeless tobacco: Never   Vaping Use    Vaping Use: Never used   Substance and Sexual Activity    Alcohol use: No    Drug use: No    Sexual activity: Defer     Family History:    Family History   Adopted: Yes   Family history unknown: Yes     Health Maintenance:    Health Maintenance   Topic Date Due    Pneumococcal Vaccine 65+ (1 of 2 - PCV) Never done    TDAP/TD VACCINES (1 - Tdap) Never done    HEPATITIS C SCREENING  Never done    ANNUAL WELLNESS VISIT  Never done    LIPID PANEL  11/03/2021    ZOSTER  "VACCINE (2 of 2) 11/29/2022    DXA SCAN  03/22/2023    COVID-19 Vaccine (6 - 2023-24 season) 11/21/2023    MAMMOGRAM  07/25/2025    COLORECTAL CANCER SCREENING  04/24/2028    INFLUENZA VACCINE  Completed     Review of Systems  Please refer to history of present illness, review of systems otherwise negative.    Vitals:    01/31/24 1035   BP: 131/74   Pulse: 72   Resp: 18   Temp: 97.4 °F (36.3 °C)   TempSrc: Temporal   SpO2: 98%   Weight: 47.2 kg (104 lb 1.6 oz)   Height: 149.9 cm (59.02\")   PainSc: 0-No pain     Body mass index is 21.01 kg/m².  Wt Readings from Last 3 Encounters:   01/31/24 47.2 kg (104 lb 1.6 oz)   01/10/24 48.4 kg (106 lb 11.2 oz)   12/20/23 47.6 kg (105 lb)     GENERAL: Alert, well-appearing female appearing her stated age who is in no apparent distress.   HEENT: Sclera anicteric. Head normocephalic, atraumatic.   NECK: No thyromegaly, supple  BREASTS: Deferred  CARDIOVASCULAR: Normal rate, regular rhythm.  No murmurs, rubs, gallops.  No peripheral edema.  RESPIRATORY: Lungs clear to auscultation bilaterally, normal respiratory effort on room air  BACK: Deferred  GASTROINTESTINAL: No tenderness, no distinct mass, no distention  SKIN:  Warm, dry, well-perfused. All visible areas intact. Port in left side of chest accessed without issue  PSYCHIATRIC: AO x3, with appropriate affect, normal thought processes. Mood and affect appropriate.  NEUROLOGIC: No focal deficits. Moves extremities well.  MUSCULOSKELETAL: Normal gait and station.   EXTREMITIES: No cyanosis, clubbing, symmetric. Bilateral LEs nontender  LYMPHATICS: No cervical adenopathy     PELVIC exam:    Deferred    ECOG PS 0    PROCEDURES: None    Diagnostic Data:     No radiology results for the last 30 days.    Lab Results   Component Value Date    WBC 6.35 01/31/2024    HGB 14.3 01/31/2024    HCT 42.4 01/31/2024    MCV 93.2 01/31/2024     01/31/2024    NEUTROABS 3.82 01/31/2024    GLUCOSE 91 01/10/2024    BUN 16 01/10/2024    " CREATININE 0.96 01/10/2024    EGFRIFNONA 70 11/29/2018     (L) 01/10/2024    K 4.1 01/10/2024    CL 97 (L) 01/10/2024    CO2 29.0 01/10/2024    MG 2.1 06/20/2018    PHOS 4.6 06/20/2018    CALCIUM 9.8 01/10/2024    ALBUMIN 4.1 01/10/2024    AST 22 01/10/2024    ALT 11 01/10/2024    BILITOT 0.4 01/10/2024     Lab Results   Component Value Date     15.3 01/10/2024     15.2 12/20/2023     14.7 11/29/2023     17.0 11/08/2023     17.3 11/01/2023     16.1 10/11/2023     17.3 09/20/2023     17.2 08/30/2023     15.8 08/09/2023     15.6 07/19/2023         Assessment & Plan   This is a 73 y.o. 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    Encounter Diagnoses   Name Primary?    Ovarian cancer, bilateral Yes    Metastasis to mediastinal lymph node      Recurrent Ovarian Cancer, see detailed cancer history above.  -s/p 6 cycles paclitaxel/carboplatin/bevacizumab   -Continuing bevacizumab as a maintenance drug until approximately March to April 2024 (1 year after completion of cytotoxic chemotherapy which was 4/20/2023)  -CT scans 7/19 show decrease in size of epicardiac and right external iliac chain lymph nodes to the point of not measuring by pathologic CT criteria  -CT 11/27: no concerning mediastinal/hilar lymphadenopathy, small stable internal ilac lymph nodes (largest 8.2 mm), hepatic steatosis; discussed with patient  - UA with 3+ protein. Will defer infusion today. 24h urine protein ordered. Patient desires continuing with treatments as planned. Will tentatively plan for infusion in 1 week pending 24h urine protein    Chemotherapy-induced neuropathy  -mild at baseline since chemotherapy in 2018  -stable     Hypertension, bevacizumab toxicity  -On lisinopril 20mg daily, hydrochlorothiazide 25mg daily   -Continue Vasotec IV to treatment regimen 0.625 mg IV as needed for systolic blood pressure greater than 160 per protocol    Proteinuria    -24-hour urine below threshold, okay to treat  -3+ on labs 11/1/2023, 2+ today 1/10  -Will consider 24-hour urine protein if UA reaches 3+ in the future    Routine Health Maintenance Screening  - see previous notes    FOLLOW UP: reevaluation in 4 week, infusion in 1 week     Wendy Hayes MD   Resident Physician, PGY-2  Gynecologic Oncology    I spent 27 minutes caring for Lyndsey on this date of service. This time includes time spent by me in the following activities: preparing for the visit, reviewing tests, performing a medically appropriate examination and/or evaluation, counseling and educating the patient/family/caregiver, ordering medications, tests, or procedures, referring and communicating with other health care professionals, documenting information in the medical record, and care coordination    Patient was seen and examined with Dr. Hayes,  resident, who performed portions of the examination and documentation for this patient's care under my direct supervision.  I agree with the above documentation and plan.    Shannan Bess MD  01/31/24  13:48 EST

## 2024-01-31 ENCOUNTER — HOSPITAL ENCOUNTER (OUTPATIENT)
Dept: ONCOLOGY | Facility: HOSPITAL | Age: 74
Discharge: HOME | End: 2024-01-31
Admitting: OBSTETRICS & GYNECOLOGY
Payer: MEDICARE

## 2024-01-31 ENCOUNTER — LAB (OUTPATIENT)
Dept: LAB | Facility: HOSPITAL | Age: 74
End: 2024-01-31
Payer: MEDICARE

## 2024-01-31 ENCOUNTER — OFFICE VISIT (OUTPATIENT)
Dept: GYNECOLOGIC ONCOLOGY | Facility: CLINIC | Age: 74
End: 2024-01-31
Payer: MEDICARE

## 2024-01-31 VITALS
RESPIRATION RATE: 18 BRPM | SYSTOLIC BLOOD PRESSURE: 131 MMHG | OXYGEN SATURATION: 98 % | DIASTOLIC BLOOD PRESSURE: 74 MMHG | BODY MASS INDEX: 20.99 KG/M2 | HEART RATE: 72 BPM | HEIGHT: 59 IN | TEMPERATURE: 97.4 F | WEIGHT: 104.1 LBS

## 2024-01-31 DIAGNOSIS — C56.3 OVARIAN CANCER, BILATERAL: Primary | ICD-10-CM

## 2024-01-31 DIAGNOSIS — C77.1 METASTASIS TO MEDIASTINAL LYMPH NODE: ICD-10-CM

## 2024-01-31 LAB
ALBUMIN SERPL-MCNC: 4.1 G/DL (ref 3.5–5.2)
ALBUMIN/GLOB SERPL: 1.4 G/DL
ALP SERPL-CCNC: 15 U/L (ref 39–117)
ALT SERPL W P-5'-P-CCNC: 13 U/L (ref 1–33)
ANION GAP SERPL CALCULATED.3IONS-SCNC: 11 MMOL/L (ref 5–15)
AST SERPL-CCNC: 27 U/L (ref 1–32)
BASOPHILS # BLD AUTO: 0.02 10*3/MM3 (ref 0–0.2)
BASOPHILS NFR BLD AUTO: 0.3 % (ref 0–1.5)
BILIRUB SERPL-MCNC: 0.4 MG/DL (ref 0–1.2)
BILIRUB UR QL STRIP: NEGATIVE
BUN SERPL-MCNC: 13 MG/DL (ref 8–23)
BUN/CREAT SERPL: 14.1 (ref 7–25)
CALCIUM SPEC-SCNC: 9.6 MG/DL (ref 8.6–10.5)
CANCER AG125 SERPL QL: 17.6 U/ML (ref 0–38.1)
CHLORIDE SERPL-SCNC: 98 MMOL/L (ref 98–107)
CLARITY UR: CLEAR
CO2 SERPL-SCNC: 27 MMOL/L (ref 22–29)
COLOR UR: YELLOW
CREAT SERPL-MCNC: 0.92 MG/DL (ref 0.57–1)
DEPRECATED RDW RBC AUTO: 43.7 FL (ref 37–54)
EGFRCR SERPLBLD CKD-EPI 2021: 65.9 ML/MIN/1.73
EOSINOPHIL # BLD AUTO: 0.12 10*3/MM3 (ref 0–0.4)
EOSINOPHIL NFR BLD AUTO: 1.9 % (ref 0.3–6.2)
ERYTHROCYTE [DISTWIDTH] IN BLOOD BY AUTOMATED COUNT: 12.7 % (ref 12.3–15.4)
GLOBULIN UR ELPH-MCNC: 2.9 GM/DL
GLUCOSE SERPL-MCNC: 110 MG/DL (ref 65–99)
GLUCOSE UR STRIP-MCNC: NEGATIVE MG/DL
HCT VFR BLD AUTO: 42.4 % (ref 34–46.6)
HGB BLD-MCNC: 14.3 G/DL (ref 12–15.9)
HGB UR QL STRIP.AUTO: NEGATIVE
IMM GRANULOCYTES # BLD AUTO: 0 10*3/MM3 (ref 0–0.05)
IMM GRANULOCYTES NFR BLD AUTO: 0 % (ref 0–0.5)
KETONES UR QL STRIP: NEGATIVE
LEUKOCYTE ESTERASE UR QL STRIP.AUTO: ABNORMAL
LYMPHOCYTES # BLD AUTO: 1.86 10*3/MM3 (ref 0.7–3.1)
LYMPHOCYTES NFR BLD AUTO: 29.3 % (ref 19.6–45.3)
MCH RBC QN AUTO: 31.4 PG (ref 26.6–33)
MCHC RBC AUTO-ENTMCNC: 33.7 G/DL (ref 31.5–35.7)
MCV RBC AUTO: 93.2 FL (ref 79–97)
MONOCYTES # BLD AUTO: 0.53 10*3/MM3 (ref 0.1–0.9)
MONOCYTES NFR BLD AUTO: 8.3 % (ref 5–12)
NEUTROPHILS NFR BLD AUTO: 3.82 10*3/MM3 (ref 1.7–7)
NEUTROPHILS NFR BLD AUTO: 60.2 % (ref 42.7–76)
NITRITE UR QL STRIP: NEGATIVE
PH UR STRIP.AUTO: 7.5 [PH] (ref 5–8)
PLATELET # BLD AUTO: 257 10*3/MM3 (ref 140–450)
PMV BLD AUTO: 10 FL (ref 6–12)
POTASSIUM SERPL-SCNC: 4.3 MMOL/L (ref 3.5–5.2)
PROT SERPL-MCNC: 7 G/DL (ref 6–8.5)
PROT UR QL STRIP: ABNORMAL
RBC # BLD AUTO: 4.55 10*6/MM3 (ref 3.77–5.28)
SODIUM SERPL-SCNC: 136 MMOL/L (ref 136–145)
SP GR UR STRIP: 1 (ref 1–1.03)
UROBILINOGEN UR QL STRIP: ABNORMAL
WBC NRBC COR # BLD AUTO: 6.35 10*3/MM3 (ref 3.4–10.8)

## 2024-01-31 PROCEDURE — 25010000002 HEPARIN LOCK FLUSH PER 10 UNITS: Performed by: OBSTETRICS & GYNECOLOGY

## 2024-01-31 PROCEDURE — 36591 DRAW BLOOD OFF VENOUS DEVICE: CPT

## 2024-01-31 PROCEDURE — 1126F AMNT PAIN NOTED NONE PRSNT: CPT | Performed by: OBSTETRICS & GYNECOLOGY

## 2024-01-31 PROCEDURE — 80053 COMPREHEN METABOLIC PANEL: CPT | Performed by: OBSTETRICS & GYNECOLOGY

## 2024-01-31 PROCEDURE — 85025 COMPLETE CBC W/AUTO DIFF WBC: CPT | Performed by: OBSTETRICS & GYNECOLOGY

## 2024-01-31 PROCEDURE — 86304 IMMUNOASSAY TUMOR CA 125: CPT | Performed by: OBSTETRICS & GYNECOLOGY

## 2024-01-31 PROCEDURE — 1160F RVW MEDS BY RX/DR IN RCRD: CPT | Performed by: OBSTETRICS & GYNECOLOGY

## 2024-01-31 PROCEDURE — 3078F DIAST BP <80 MM HG: CPT | Performed by: OBSTETRICS & GYNECOLOGY

## 2024-01-31 PROCEDURE — 81003 URINALYSIS AUTO W/O SCOPE: CPT | Performed by: OBSTETRICS & GYNECOLOGY

## 2024-01-31 PROCEDURE — 1159F MED LIST DOCD IN RCRD: CPT | Performed by: OBSTETRICS & GYNECOLOGY

## 2024-01-31 PROCEDURE — 3075F SYST BP GE 130 - 139MM HG: CPT | Performed by: OBSTETRICS & GYNECOLOGY

## 2024-01-31 PROCEDURE — 99213 OFFICE O/P EST LOW 20 MIN: CPT | Performed by: OBSTETRICS & GYNECOLOGY

## 2024-01-31 RX ORDER — SODIUM CHLORIDE 0.9 % (FLUSH) 0.9 %
10 SYRINGE (ML) INJECTION AS NEEDED
Status: DISCONTINUED | OUTPATIENT
Start: 2024-01-31 | End: 2024-02-01 | Stop reason: HOSPADM

## 2024-01-31 RX ORDER — HEPARIN SODIUM (PORCINE) LOCK FLUSH IV SOLN 100 UNIT/ML 100 UNIT/ML
500 SOLUTION INTRAVENOUS AS NEEDED
OUTPATIENT
Start: 2024-04-01

## 2024-01-31 RX ORDER — HEPARIN SODIUM (PORCINE) LOCK FLUSH IV SOLN 100 UNIT/ML 100 UNIT/ML
500 SOLUTION INTRAVENOUS AS NEEDED
Status: DISCONTINUED | OUTPATIENT
Start: 2024-01-31 | End: 2024-02-01 | Stop reason: HOSPADM

## 2024-01-31 RX ORDER — SODIUM CHLORIDE 0.9 % (FLUSH) 0.9 %
10 SYRINGE (ML) INJECTION AS NEEDED
OUTPATIENT
Start: 2024-04-01

## 2024-01-31 RX ADMIN — Medication 10 ML: at 11:40

## 2024-01-31 RX ADMIN — HEPARIN 500 UNITS: 100 SYRINGE at 11:40

## 2024-02-02 ENCOUNTER — LAB (OUTPATIENT)
Dept: LAB | Facility: HOSPITAL | Age: 74
End: 2024-02-02
Payer: MEDICARE

## 2024-02-02 DIAGNOSIS — C56.3 OVARIAN CANCER, BILATERAL: ICD-10-CM

## 2024-02-02 DIAGNOSIS — C77.1 METASTASIS TO MEDIASTINAL LYMPH NODE: ICD-10-CM

## 2024-02-02 LAB
COLLECT DURATION TIME UR: 24 HRS
PROT 24H UR-MRATE: 1220.1 MG/24HOURS (ref 0–150)
SPECIMEN VOL 24H UR: 2100 ML

## 2024-02-02 PROCEDURE — 81050 URINALYSIS VOLUME MEASURE: CPT

## 2024-02-02 PROCEDURE — 84156 ASSAY OF PROTEIN URINE: CPT

## 2024-02-05 ENCOUNTER — TELEPHONE (OUTPATIENT)
Dept: GYNECOLOGIC ONCOLOGY | Facility: CLINIC | Age: 74
End: 2024-02-05
Payer: MEDICARE

## 2024-02-05 RX ORDER — SODIUM CHLORIDE 9 MG/ML
20 INJECTION, SOLUTION INTRAVENOUS ONCE
OUTPATIENT
Start: 2024-02-07

## 2024-02-05 RX ORDER — ENALAPRILAT 1.25 MG/ML
0.62 INJECTION INTRAVENOUS EVERY 6 HOURS PRN
Start: 2024-02-07

## 2024-02-05 NOTE — TELEPHONE ENCOUNTER
"  Caller: Lyndsey Tavares \"FABIAN\"    Relationship: Self    Best call back number: [phone]    What is the best time to reach you: ANY    Who are you requesting to speak with (clinical staff, provider,  specific staff member):CLINICAL         What was the call regarding: PATIENT CALLED TO MAKE SURE THAT HER APPT FOR TREATMENT ON WEDNESDAY IS STILL ON. LAST TREATMENT WAS CANCELLED DUE TO \"HIGH PROTEIN\"     Is it okay if the provider responds through MyChart: NO        "

## 2024-02-05 NOTE — TELEPHONE ENCOUNTER
I called pt to discuss 24 hour protein.    She she strongly desires continuation of bevacizumab unless it is absolutely contraindicated.  We discussed that over 2 g is contraindicated and that I feel 1 to 2 g we can proceed with caution.  We discussed that she only has a couple of infusions left.  After discussion of pros and cons we agreed that she will undergo infusion February 7, 2024 and any future infusions will be held if she has 3+ proteinuria.  She was pleased with this plan.  Electronically signed by Shannan Bess MD, 02/05/24, 3:08 PM EST.

## 2024-02-07 ENCOUNTER — HOSPITAL ENCOUNTER (OUTPATIENT)
Dept: ONCOLOGY | Facility: HOSPITAL | Age: 74
Discharge: HOME | End: 2024-02-07
Admitting: OBSTETRICS & GYNECOLOGY
Payer: MEDICARE

## 2024-02-07 ENCOUNTER — APPOINTMENT (OUTPATIENT)
Dept: ONCOLOGY | Facility: HOSPITAL | Age: 74
End: 2024-02-07
Payer: MEDICARE

## 2024-02-07 VITALS
HEIGHT: 59 IN | DIASTOLIC BLOOD PRESSURE: 70 MMHG | BODY MASS INDEX: 21.15 KG/M2 | SYSTOLIC BLOOD PRESSURE: 147 MMHG | RESPIRATION RATE: 18 BRPM | HEART RATE: 69 BPM | TEMPERATURE: 97.6 F | WEIGHT: 104.9 LBS

## 2024-02-07 DIAGNOSIS — C56.3 OVARIAN CANCER, BILATERAL: Primary | ICD-10-CM

## 2024-02-07 LAB
ALBUMIN SERPL-MCNC: 4.2 G/DL (ref 3.5–5.2)
ALBUMIN/GLOB SERPL: 1.8 G/DL
ALP SERPL-CCNC: 21 U/L (ref 39–117)
ALT SERPL W P-5'-P-CCNC: 9 U/L (ref 1–33)
ANION GAP SERPL CALCULATED.3IONS-SCNC: 11 MMOL/L (ref 5–15)
AST SERPL-CCNC: 19 U/L (ref 1–32)
BASOPHILS # BLD AUTO: 0.03 10*3/MM3 (ref 0–0.2)
BASOPHILS NFR BLD AUTO: 0.5 % (ref 0–1.5)
BILIRUB SERPL-MCNC: 0.2 MG/DL (ref 0–1.2)
BILIRUB UR QL STRIP: NEGATIVE
BUN SERPL-MCNC: 17 MG/DL (ref 8–23)
BUN/CREAT SERPL: 19.3 (ref 7–25)
CALCIUM SPEC-SCNC: 10 MG/DL (ref 8.6–10.5)
CHLORIDE SERPL-SCNC: 94 MMOL/L (ref 98–107)
CLARITY UR: CLEAR
CO2 SERPL-SCNC: 28 MMOL/L (ref 22–29)
COLOR UR: YELLOW
CREAT SERPL-MCNC: 0.88 MG/DL (ref 0.57–1)
DEPRECATED RDW RBC AUTO: 43.4 FL (ref 37–54)
EGFRCR SERPLBLD CKD-EPI 2021: 69.5 ML/MIN/1.73
EOSINOPHIL # BLD AUTO: 0.15 10*3/MM3 (ref 0–0.4)
EOSINOPHIL NFR BLD AUTO: 2.3 % (ref 0.3–6.2)
ERYTHROCYTE [DISTWIDTH] IN BLOOD BY AUTOMATED COUNT: 12.7 % (ref 12.3–15.4)
GLOBULIN UR ELPH-MCNC: 2.4 GM/DL
GLUCOSE SERPL-MCNC: 99 MG/DL (ref 65–99)
GLUCOSE UR STRIP-MCNC: NEGATIVE MG/DL
HCT VFR BLD AUTO: 40.8 % (ref 34–46.6)
HGB BLD-MCNC: 14 G/DL (ref 12–15.9)
HGB UR QL STRIP.AUTO: NEGATIVE
IMM GRANULOCYTES # BLD AUTO: 0.01 10*3/MM3 (ref 0–0.05)
IMM GRANULOCYTES NFR BLD AUTO: 0.2 % (ref 0–0.5)
KETONES UR QL STRIP: NEGATIVE
LEUKOCYTE ESTERASE UR QL STRIP.AUTO: NEGATIVE
LYMPHOCYTES # BLD AUTO: 2.11 10*3/MM3 (ref 0.7–3.1)
LYMPHOCYTES NFR BLD AUTO: 31.8 % (ref 19.6–45.3)
MCH RBC QN AUTO: 31.7 PG (ref 26.6–33)
MCHC RBC AUTO-ENTMCNC: 34.3 G/DL (ref 31.5–35.7)
MCV RBC AUTO: 92.3 FL (ref 79–97)
MONOCYTES # BLD AUTO: 0.56 10*3/MM3 (ref 0.1–0.9)
MONOCYTES NFR BLD AUTO: 8.4 % (ref 5–12)
NEUTROPHILS NFR BLD AUTO: 3.78 10*3/MM3 (ref 1.7–7)
NEUTROPHILS NFR BLD AUTO: 56.8 % (ref 42.7–76)
NITRITE UR QL STRIP: NEGATIVE
PH UR STRIP.AUTO: 7 [PH] (ref 5–8)
PLATELET # BLD AUTO: 253 10*3/MM3 (ref 140–450)
PMV BLD AUTO: 10.1 FL (ref 6–12)
POTASSIUM SERPL-SCNC: 4.1 MMOL/L (ref 3.5–5.2)
PROT SERPL-MCNC: 6.6 G/DL (ref 6–8.5)
PROT UR QL STRIP: ABNORMAL
RBC # BLD AUTO: 4.42 10*6/MM3 (ref 3.77–5.28)
SODIUM SERPL-SCNC: 133 MMOL/L (ref 136–145)
SP GR UR STRIP: 1.01 (ref 1–1.03)
UROBILINOGEN UR QL STRIP: ABNORMAL
WBC NRBC COR # BLD AUTO: 6.64 10*3/MM3 (ref 3.4–10.8)

## 2024-02-07 PROCEDURE — 80053 COMPREHEN METABOLIC PANEL: CPT | Performed by: OBSTETRICS & GYNECOLOGY

## 2024-02-07 PROCEDURE — 81003 URINALYSIS AUTO W/O SCOPE: CPT | Performed by: OBSTETRICS & GYNECOLOGY

## 2024-02-07 PROCEDURE — 85025 COMPLETE CBC W/AUTO DIFF WBC: CPT | Performed by: OBSTETRICS & GYNECOLOGY

## 2024-02-07 PROCEDURE — 25010000002 HEPARIN LOCK FLUSH PER 10 UNITS: Performed by: OBSTETRICS & GYNECOLOGY

## 2024-02-07 PROCEDURE — 36591 DRAW BLOOD OFF VENOUS DEVICE: CPT

## 2024-02-07 RX ORDER — HEPARIN SODIUM (PORCINE) LOCK FLUSH IV SOLN 100 UNIT/ML 100 UNIT/ML
500 SOLUTION INTRAVENOUS AS NEEDED
Status: DISCONTINUED | OUTPATIENT
Start: 2024-02-07 | End: 2024-02-08 | Stop reason: HOSPADM

## 2024-02-07 RX ORDER — HEPARIN SODIUM (PORCINE) LOCK FLUSH IV SOLN 100 UNIT/ML 100 UNIT/ML
500 SOLUTION INTRAVENOUS AS NEEDED
OUTPATIENT
Start: 2024-04-01

## 2024-02-07 RX ORDER — SODIUM CHLORIDE 0.9 % (FLUSH) 0.9 %
10 SYRINGE (ML) INJECTION AS NEEDED
OUTPATIENT
Start: 2024-04-01

## 2024-02-07 RX ADMIN — HEPARIN 500 UNITS: 100 SYRINGE at 15:01

## 2024-02-07 NOTE — PROGRESS NOTES
Patient here for zirabev. Urine protein 3+. Call to Dr. Bess to notify. Per Dr. Bess, if patient wants to proceed today it is ok, but going forward if she is 3+ treatment will be held. Ultimately, patient's decision today. Notified patient of urine protein, and instructed that Dr. Bess is ok to treat today if patient wants to proceed, but going forward treatment with 3+ protein will be held. Patient decided to not do treatment today. Call back to Dr. Bess to notify. Per Dr. Bess, patient to keep scheduled appointment with her on 2/28, no need to come in for labs prior. Patient agreeable with plan. Next infusion appointment cancelled, patient scheduled for port flush. Patient agreeable with plan. SOTO, RN

## 2024-02-27 NOTE — PROGRESS NOTES
Lyndsey [facility]  [id number]  1950    Reason for visit: History of ovarian adenocarcinoma s/p biopsy-proven lymph node recurrence in the chest now on bevacizumab maintenance     History of present illness:  The patient is a 73 y.o. year old female who presents today for treatment and evaluation of the above issues.    Last cycle of bevacizumab maintenance therapy was held due to proteinuria, received one week later. Tolerated well with stable peripheral neuropathy. In her hands it has resolved, in her toes it is minimal. Today, she notes good appetite. She reports normal bladder and bowel function. She reports good energy and is performing ADLs.  She notes no mucositis.       Oncologic History:  Oncology/Hematology History   Ovarian cancer, bilateral   6/22/2018 Initial Diagnosis    Abnormal examination by GI for patient c/o bloating and diarrhea, sent to GYN. TVUS concerning for malignancy. CT showed multiple cystic masses on bilateral ovaries, possible omental implants, and large volume ascites. Paracentesis performed and cytology returned positive for metastatic adenocarcinoma, favor GYN origin. CA-125 at diagnosis = 907.7. Referred to Gyn Oncology.      6/28/2018 Procedure    Port-a-cath placement     7/6/2018 - 8/16/2018 Chemotherapy    OP OVARIAN PACLitaxel / CARBOplatin (Q21D)  Neoadjuvant x 3 cycles     7/16/2018 -  Chemotherapy    OP CENTRAL VENOUS ACCESS DEVICE CARE AND MAINTENANCE     9/6/2018 Imaging    Interval CT showed improvement in ascites and resolution of previous left pleural effusion     9/11/2018 Surgery    Exploratory laparotomy, EMMA/BSO, debulking to R=0, and partial rectal resection. 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 Primary tumor site thought to be left fallopian tube. Bilateral ovarian, tubal, and surface involvement. Omentum, pelvic peritoneum, colon serosa, and rectal serosa involved. Macroscopic extrapelvic peritoneal implants also found. Stage  IIIB grade 3         10/3/2018 - 11/29/2018 Chemotherapy    OP OVARIAN PACLitaxel / CARBOplatin AUC=6 / Bevacizumab 15 mg/kg  3 cycles adjuvant chemotherapy planned.     Patient chose to decline Avastin with all adjuvant cycles.     10/19/2018 Genetic Testing    Counseling and testing completed via CancerNext panel. 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 Patient does not have disease, but is a carrier.     1/3/2019 Imaging    Post-treatment CT chest, abdomen, and pelvis. No ascites, nodularity, or residual disease seen. No evidence of progression or active malignancy.      3/21/2019 Imaging    CT chest, abdomen, pelvis for diffuse abdominal pain. Study negative for recurrent disease     12/29/2020 Imaging    CT chest, abdomen, pelvis prior to port removal:  Stable examination with no CT evidence of acute intrathoracic, intra-abdominal or pelvic abnormality. No evidence of progression or metastatic disease.     9/10/2021 Imaging    CT chest, abdomen, pelvis:  Stable appearance of the chest abdomen and pelvis without acute pathology or evidence for occurrence or active metastatic disease     6/22/2022 Imaging    CT chest, abdomen, pelvis:  No evidence of metastatic disease within the abdomen or pelvis. Slowly enlarging bilateral pericardial fat pad lymph notes. Findings  are nonspecific given the slow progression since 2020, but are  concerning for metastatic disease or lymphoma.     8/22/2022 Progression    CT biopsy left pericardial lymph node. 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     9/20/2022 Molecular Testing    CARIS results:    PD-L1 positive  ER positive 2+, 75%  MSI stable, MMR proficient, MEGAN low, TMB low  PAMELA, BRCA 1/2, RAD51 C/D all negative  VA negative  Her2/Kike negative     12/13/2022 - 1/25/2023 Chemotherapy    OP OVARIAN PACLitaxel / CARBOplatin (Q21D)     2/22/2023 -  4/5/2023 Chemotherapy    OP OVARIAN PACLitaxel / CARBOplatin AUC=6 / Bevacizumab 15 mg/kg     4/26/2023 Imaging    CT C/A/P  Impression:  1.Decreased size of masses in the pericardial fat.  2.Decreased size of previously noted enlarged right external iliac lymph node.     4/26/2023 -  Chemotherapy    OP OVARIAN Bevacizumab 15 mg/kg (Maintenance)       1/10/2024 -  Chemotherapy    OP OVARIAN Bevacizumab 15 mg/kg (Maintenance)       Past Medical History:   Diagnosis Date    Hypertension     Ovarian cancer 2018    chemotherapy     Wears glasses      Past Surgical History:   Procedure Laterality Date    APPENDECTOMY      possibly with tubal (not certain)    BREAST BIOPSY Bilateral 1972    cysts removed     COLONOSCOPY      EXPLORATORY LAPAROTOMY, TOTAL ABDOMINAL HYSTERECTOMY SALPINGO OOPHORECTOMY N/A 09/11/2018    Procedure: LAPAROTOMY EXPLORATORY, TOTAL ABDOMINAL HYSTERECTOMY, BILATERAL SALPINGO OOPHORECTOMY, DEBULKING;  Surgeon: Shannan Bess MD;  [facility];  Service: Gynecology    HERNIA REPAIR Right     inguinal     SUBLINGUAL SALIVARY CYST EXCISION Right 2020    at [facility]    TUBAL ABDOMINAL LIGATION       MEDICATIONS: The current medication list was reviewed with the patient and updated in the EMR this date per the Medical Assistant. Medication dosages and frequencies were confirmed to be accurate.      Allergies:  has No Known Allergies.    Social History:   Social History     Socioeconomic History    Marital status:    Tobacco Use    Smoking status: Never    Smokeless tobacco: Never   Vaping Use    Vaping Use: Never used   Substance and Sexual Activity    Alcohol use: No    Drug use: No    Sexual activity: Defer     Family History:    Family History   Adopted: Yes   Family history unknown: Yes     Health Maintenance:    Health Maintenance   Topic Date Due    Pneumococcal Vaccine 65+ (1 of 2 - PCV) Never done    TDAP/TD VACCINES (1 - Tdap) Never done    RSV Vaccine - Adults (1 - 1-dose 60+  "series) Never done    HEPATITIS C SCREENING  Never done    ANNUAL WELLNESS VISIT  Never done    LIPID PANEL  11/03/2021    ZOSTER VACCINE (2 of 2) 11/29/2022    DXA SCAN  03/22/2023    COVID-19 Vaccine (6 - 2023-24 season) 11/21/2023    MAMMOGRAM  07/25/2025    COLORECTAL CANCER SCREENING  04/24/2028    INFLUENZA VACCINE  Completed     Review of Systems  Please refer to history of present illness, review of systems otherwise negative.    Vitals:    02/28/24 1021   BP: 156/77   Pulse: 71   Resp: 16   Temp: 98.2 °F (36.8 °C)   TempSrc: Temporal   SpO2: 100%   Weight: 47.9 kg (105 lb 11.2 oz)   Height: 149.9 cm (59\")   PainSc: 0-No pain       Body mass index is 21.35 kg/m².  Wt Readings from Last 3 Encounters:   02/28/24 47.9 kg (105 lb 11.2 oz)   02/07/24 47.6 kg (104 lb 14.4 oz)   01/31/24 47.2 kg (104 lb 1.6 oz)     GENERAL: Alert, well-appearing female appearing her stated age who is in no apparent distress.   HEENT: Sclera anicteric. Head normocephalic, atraumatic.   NECK: No thyromegaly, supple  BREASTS: Deferred  CARDIOVASCULAR: Normal rate, regular rhythm.  No murmurs, rubs, gallops.  No peripheral edema.  RESPIRATORY: Lungs clear to auscultation bilaterally, normal respiratory effort on room air  BACK: Deferred  GASTROINTESTINAL: No tenderness, no distinct mass, no distention  SKIN:  Warm, dry, well-perfused. All visible areas intact. Port in left side of chest accessed without issue  PSYCHIATRIC: AO x3, with appropriate affect, normal thought processes. Mood and affect appropriate.  NEUROLOGIC: No focal deficits. Moves extremities well.  MUSCULOSKELETAL: Normal gait and station.   EXTREMITIES: No cyanosis, clubbing, symmetric. Bilateral LEs nontender  LYMPHATICS: No cervical adenopathy     PELVIC exam:    Deferred    ECOG PS 0    PROCEDURES: None    Diagnostic Data:     No radiology results for the last 30 days.    Lab Results   Component Value Date    WBC 6.64 02/07/2024    HGB 14.0 02/07/2024    HCT 40.8 " 02/07/2024    MCV 92.3 02/07/2024     02/07/2024    NEUTROABS 3.78 02/07/2024    GLUCOSE 99 02/07/2024    BUN 17 02/07/2024    CREATININE 0.88 02/07/2024    EGFRIFNONA 70 11/29/2018     (L) 02/07/2024    K 4.1 02/07/2024    CL 94 (L) 02/07/2024    CO2 28.0 02/07/2024    MG 2.1 06/20/2018    PHOS 4.6 06/20/2018    CALCIUM 10.0 02/07/2024    ALBUMIN 4.2 02/07/2024    AST 19 02/07/2024    ALT 9 02/07/2024    BILITOT 0.2 02/07/2024     Lab Results   Component Value Date     17.6 01/31/2024     15.3 01/10/2024     15.2 12/20/2023     14.7 11/29/2023     17.0 11/08/2023     17.3 11/01/2023     16.1 10/11/2023     17.3 09/20/2023     17.2 08/30/2023     15.8 08/09/2023         Assessment & Plan   This is a 73 y.o. 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    Encounter Diagnosis   Name Primary?    Ovarian cancer, bilateral Yes       Recurrent Ovarian Cancer, see detailed cancer history above.  -s/p 6 cycles paclitaxel/carboplatin/bevacizumab   -Continuing bevacizumab as a maintenance drug until approximately March to April 2024 (1 year after completion of cytotoxic chemotherapy which was 4/20/2023)  -CT scans 7/19 show decrease in size of epicardiac and right external iliac chain lymph nodes to the point of not measuring by pathologic CT criteria  -CT 11/27: no concerning mediastinal/hilar lymphadenopathy, small stable internal ilac lymph nodes (largest 8.2 mm), hepatic steatosis; discussed with patient  - 24 hour urine protein was marginal for continuation of treatment  - Given her proteinuria (3+ on repeat today), will plan to discontinue systemic therapy. Will see if PCP wants to follow proteinuria or if referral to nephrology is indicated.    Chemotherapy-induced neuropathy  -mild at baseline since chemotherapy in 2018  -stable     Hypertension, bevacizumab toxicity  -On lisinopril 20mg daily, hydrochlorothiazide 25mg daily    -Continue Vasotec IV to treatment regimen 0.625 mg IV as needed for systolic blood pressure greater than 160 per protocol    Proteinuria   -24-hour urine below threshold, okay to treat  -3+ on labs 11/1/2023, 2+ 1/10,3+ on 2/7  -Will consider 24-hour urine protein if UA reaches 3+ in the future  - Will need urinalysis today    Routine Health Maintenance Screening  - see previous notes    FOLLOW UP: reevaluation in 4 week, infusion in 1 week     Leatha Duncan MD  Gynecologic Oncology Resident     I spent 21 minutes caring for Lyndsey on this date of service. This time includes time spent by me in the following activities: preparing for the visit, reviewing tests, performing a medically appropriate examination and/or evaluation, counseling and educating the patient/family/caregiver, ordering medications, tests, or procedures, referring and communicating with other health care professionals, documenting information in the medical record, and care coordination    Patient was seen and examined with Dr. Duncan,  resident, who performed portions of the examination and documentation for this patient's care under my direct supervision.  I agree with the above documentation and plan.    Shannan Bess MD  02/28/24  14:06 EST

## 2024-02-28 ENCOUNTER — OFFICE VISIT (OUTPATIENT)
Dept: GYNECOLOGIC ONCOLOGY | Facility: CLINIC | Age: 74
End: 2024-02-28
Payer: MEDICARE

## 2024-02-28 ENCOUNTER — TELEPHONE (OUTPATIENT)
Dept: GYNECOLOGIC ONCOLOGY | Facility: CLINIC | Age: 74
End: 2024-02-28

## 2024-02-28 ENCOUNTER — LAB (OUTPATIENT)
Dept: LAB | Facility: HOSPITAL | Age: 74
End: 2024-02-28
Payer: MEDICARE

## 2024-02-28 VITALS
OXYGEN SATURATION: 100 % | BODY MASS INDEX: 21.31 KG/M2 | RESPIRATION RATE: 16 BRPM | DIASTOLIC BLOOD PRESSURE: 77 MMHG | WEIGHT: 105.7 LBS | HEIGHT: 59 IN | HEART RATE: 71 BPM | SYSTOLIC BLOOD PRESSURE: 156 MMHG | TEMPERATURE: 98.2 F

## 2024-02-28 DIAGNOSIS — C56.3 OVARIAN CANCER, BILATERAL: Primary | ICD-10-CM

## 2024-02-28 DIAGNOSIS — R80.8 OTHER PROTEINURIA: ICD-10-CM

## 2024-02-28 LAB
BILIRUB UR QL STRIP: NEGATIVE
CLARITY UR: CLEAR
COLOR UR: YELLOW
GLUCOSE UR STRIP-MCNC: NEGATIVE MG/DL
HGB UR QL STRIP.AUTO: NEGATIVE
KETONES UR QL STRIP: NEGATIVE
LEUKOCYTE ESTERASE UR QL STRIP.AUTO: NEGATIVE
NITRITE UR QL STRIP: NEGATIVE
PH UR STRIP.AUTO: 6.5 [PH] (ref 5–8)
PROT UR QL STRIP: ABNORMAL
SP GR UR STRIP: 1.01 (ref 1–1.03)
UROBILINOGEN UR QL STRIP: ABNORMAL

## 2024-02-28 PROCEDURE — 99213 OFFICE O/P EST LOW 20 MIN: CPT | Performed by: OBSTETRICS & GYNECOLOGY

## 2024-02-28 PROCEDURE — 81003 URINALYSIS AUTO W/O SCOPE: CPT

## 2024-02-28 PROCEDURE — 1126F AMNT PAIN NOTED NONE PRSNT: CPT | Performed by: OBSTETRICS & GYNECOLOGY

## 2024-02-28 PROCEDURE — 1159F MED LIST DOCD IN RCRD: CPT | Performed by: OBSTETRICS & GYNECOLOGY

## 2024-02-28 PROCEDURE — 3077F SYST BP >= 140 MM HG: CPT | Performed by: OBSTETRICS & GYNECOLOGY

## 2024-02-28 PROCEDURE — 3078F DIAST BP <80 MM HG: CPT | Performed by: OBSTETRICS & GYNECOLOGY

## 2024-02-28 PROCEDURE — 1160F RVW MEDS BY RX/DR IN RCRD: CPT | Performed by: OBSTETRICS & GYNECOLOGY

## 2024-02-28 NOTE — TELEPHONE ENCOUNTER
RN called Dr. Umana's office (Eugenia's PCP) and spoke to someone there about Dr. Umana managing Eugenia's proteinuria per MD request. Dr. Umana has been out on maternity leave per her office, but the message was left for to see if the MD managing Dr. Umana's patients would be willing to check in on Eugenia and manage he care. RN told the office that if PCP and her team are not comfortable with this, Dr. Bess will refer patient to nephrology.    Message and office number left for call back to conform management of care plan.   ----- Message from Shannan Bess MD sent at 2/28/2024  2:02 PM EST -----  Please see if patient's primary care provider is willing to see her for proteinuria.  If not, we can refer to nephrology to see if there is any way to optimized this or if she needs any neck steps.  She is already done a 24-hour urine and those results are available for review as well.  Thanks!    ----- Message -----  From: Lab, Background User  Sent: 2/28/2024  12:44 PM EST  To: Shannan Bess MD

## 2024-03-01 ENCOUNTER — TELEPHONE (OUTPATIENT)
Dept: GYNECOLOGIC ONCOLOGY | Facility: CLINIC | Age: 74
End: 2024-03-01
Payer: MEDICARE

## 2024-03-01 NOTE — TELEPHONE ENCOUNTER
Patient's primary care doctor's office called and informed the Rn that Dr. Umana will manage this patient's proteinuria. Patient already has a follow up appointment scheduled.

## 2024-03-04 ENCOUNTER — HOSPITAL ENCOUNTER (OUTPATIENT)
Dept: CT IMAGING | Facility: HOSPITAL | Age: 74
Discharge: HOME | End: 2024-03-04
Admitting: OBSTETRICS & GYNECOLOGY
Payer: MEDICARE

## 2024-03-04 DIAGNOSIS — C56.3 OVARIAN CANCER, BILATERAL: ICD-10-CM

## 2024-03-04 PROCEDURE — 25510000001 IOPAMIDOL 61 % SOLUTION: Performed by: OBSTETRICS & GYNECOLOGY

## 2024-03-04 PROCEDURE — 71260 CT THORAX DX C+: CPT

## 2024-03-04 PROCEDURE — 74177 CT ABD & PELVIS W/CONTRAST: CPT

## 2024-03-04 RX ORDER — HEPARIN SODIUM (PORCINE) LOCK FLUSH IV SOLN 100 UNIT/ML 100 UNIT/ML
SOLUTION INTRAVENOUS
Status: DISPENSED
Start: 2024-03-04 | End: 2024-03-04

## 2024-03-04 RX ORDER — HEPARIN SODIUM (PORCINE) LOCK FLUSH IV SOLN 100 UNIT/ML 100 UNIT/ML
500 SOLUTION INTRAVENOUS ONCE
Status: DISCONTINUED | OUTPATIENT
Start: 2024-03-04 | End: 2024-03-05 | Stop reason: HOSPADM

## 2024-03-04 RX ADMIN — IOPAMIDOL 85 ML: 612 INJECTION, SOLUTION INTRAVENOUS at 11:46

## 2024-03-06 ENCOUNTER — TELEPHONE (OUTPATIENT)
Dept: GYNECOLOGIC ONCOLOGY | Facility: CLINIC | Age: 74
End: 2024-03-06
Payer: MEDICARE

## 2024-03-06 NOTE — TELEPHONE ENCOUNTER
"  Caller: Lyndsey Tavares \"FABIAN\"    Relationship: Self    Best call back number: [phone]    What is the best time to reach you: ANYTIME    Who are you requesting to speak with (clinical staff, provider,  specific staff member): CLINICAL    What was the call regarding: RETURNING MISSED CALL      "

## 2024-03-06 NOTE — TELEPHONE ENCOUNTER
RN called patient and reviewed her scan results with her per MD request. Patient verbalized understanding.

## 2024-03-08 ENCOUNTER — TELEPHONE (OUTPATIENT)
Dept: GYNECOLOGIC ONCOLOGY | Facility: CLINIC | Age: 74
End: 2024-03-08
Payer: MEDICARE

## 2024-03-08 NOTE — TELEPHONE ENCOUNTER
RN called patient and discussed her going ahead with the colonoscopy. Patient was encouraged to review any concerns with her GI provider and review her history with them as well. Patient verbalized understanding.

## 2024-03-13 LAB — CREAT BLDA-MCNC: 1 MG/DL (ref 0.6–1.3)

## 2024-04-17 ENCOUNTER — HOSPITAL ENCOUNTER (OUTPATIENT)
Dept: ONCOLOGY | Facility: HOSPITAL | Age: 74
Discharge: HOME | End: 2024-04-17
Admitting: OBSTETRICS & GYNECOLOGY
Payer: MEDICARE

## 2024-04-17 VITALS
RESPIRATION RATE: 16 BRPM | WEIGHT: 103.5 LBS | TEMPERATURE: 97.7 F | HEART RATE: 82 BPM | DIASTOLIC BLOOD PRESSURE: 70 MMHG | HEIGHT: 59 IN | BODY MASS INDEX: 20.87 KG/M2 | SYSTOLIC BLOOD PRESSURE: 160 MMHG

## 2024-04-17 DIAGNOSIS — C56.3 OVARIAN CANCER, BILATERAL: Primary | ICD-10-CM

## 2024-04-17 PROCEDURE — 96523 IRRIG DRUG DELIVERY DEVICE: CPT

## 2024-04-17 PROCEDURE — 25010000002 HEPARIN LOCK FLUSH PER 10 UNITS: Performed by: OBSTETRICS & GYNECOLOGY

## 2024-04-17 RX ORDER — SODIUM CHLORIDE 0.9 % (FLUSH) 0.9 %
10 SYRINGE (ML) INJECTION AS NEEDED
OUTPATIENT
Start: 2024-07-01

## 2024-04-17 RX ORDER — HEPARIN SODIUM (PORCINE) LOCK FLUSH IV SOLN 100 UNIT/ML 100 UNIT/ML
500 SOLUTION INTRAVENOUS AS NEEDED
OUTPATIENT
Start: 2024-07-01

## 2024-04-17 RX ORDER — HEPARIN SODIUM (PORCINE) LOCK FLUSH IV SOLN 100 UNIT/ML 100 UNIT/ML
500 SOLUTION INTRAVENOUS AS NEEDED
Status: DISCONTINUED | OUTPATIENT
Start: 2024-04-17 | End: 2024-04-18 | Stop reason: HOSPADM

## 2024-04-17 RX ADMIN — HEPARIN 500 UNITS: 100 SYRINGE at 09:51

## 2024-05-02 ENCOUNTER — TELEPHONE (OUTPATIENT)
Dept: GYNECOLOGIC ONCOLOGY | Facility: CLINIC | Age: 74
End: 2024-05-02
Payer: MEDICARE

## 2024-05-02 NOTE — TELEPHONE ENCOUNTER
Spoke with patient. She saw an order for  on 5/17/24. Informed her that she could have it drawn at [facility] appointment on 5/39. Pt v/u

## 2024-05-02 NOTE — TELEPHONE ENCOUNTER
"  Caller: Lyndsey Tavares \"FABIAN\"    Relationship: Self    Best call back number: [phone]      What was the call regarding: PATIENT CALLED TO SPEAK TO ANTOINETTE IN SCHEDULING       "

## 2024-05-06 ENCOUNTER — TRANSCRIBE ORDERS (OUTPATIENT)
Dept: ADMINISTRATIVE | Facility: HOSPITAL | Age: 74
End: 2024-05-06
Payer: MEDICARE

## 2024-05-06 ENCOUNTER — LAB (OUTPATIENT)
Dept: LAB | Facility: HOSPITAL | Age: 74
End: 2024-05-06
Payer: MEDICARE

## 2024-05-06 DIAGNOSIS — R80.9 PROTEINURIA, UNSPECIFIED TYPE: Primary | ICD-10-CM

## 2024-05-06 DIAGNOSIS — R80.9 PROTEINURIA, UNSPECIFIED TYPE: ICD-10-CM

## 2024-05-06 LAB
ALBUMIN SERPL-MCNC: 4.2 G/DL (ref 3.5–5.2)
ALBUMIN/GLOB SERPL: 1.4 G/DL
ALP SERPL-CCNC: 17 U/L (ref 39–117)
ALT SERPL W P-5'-P-CCNC: 14 U/L (ref 1–33)
ANION GAP SERPL CALCULATED.3IONS-SCNC: 11.7 MMOL/L (ref 5–15)
AST SERPL-CCNC: 23 U/L (ref 1–32)
BILIRUB SERPL-MCNC: 0.5 MG/DL (ref 0–1.2)
BUN SERPL-MCNC: 20 MG/DL (ref 8–23)
BUN/CREAT SERPL: 17.4 (ref 7–25)
CALCIUM SPEC-SCNC: 10.3 MG/DL (ref 8.6–10.5)
CHLORIDE SERPL-SCNC: 97 MMOL/L (ref 98–107)
CO2 SERPL-SCNC: 29.3 MMOL/L (ref 22–29)
CREAT SERPL-MCNC: 1.15 MG/DL (ref 0.57–1)
EGFRCR SERPLBLD CKD-EPI 2021: 50.4 ML/MIN/1.73
GLOBULIN UR ELPH-MCNC: 3 GM/DL
GLUCOSE SERPL-MCNC: 97 MG/DL (ref 65–99)
POTASSIUM SERPL-SCNC: 4.4 MMOL/L (ref 3.5–5.2)
PROT SERPL-MCNC: 7.2 G/DL (ref 6–8.5)
SODIUM SERPL-SCNC: 138 MMOL/L (ref 136–145)

## 2024-05-06 PROCEDURE — 36415 COLL VENOUS BLD VENIPUNCTURE: CPT

## 2024-05-06 PROCEDURE — 80053 COMPREHEN METABOLIC PANEL: CPT

## 2024-05-06 PROCEDURE — 81001 URINALYSIS AUTO W/SCOPE: CPT

## 2024-05-06 PROCEDURE — 82570 ASSAY OF URINE CREATININE: CPT

## 2024-05-06 PROCEDURE — 84156 ASSAY OF PROTEIN URINE: CPT

## 2024-05-07 LAB
BACTERIA UR QL AUTO: NORMAL /HPF
BILIRUB UR QL STRIP: NEGATIVE
BILIRUB UR QL STRIP: NEGATIVE
CLARITY UR: CLEAR
CLARITY UR: CLEAR
COLOR UR: YELLOW
COLOR UR: YELLOW
CREAT UR-MCNC: 76.9 MG/DL
GLUCOSE UR STRIP-MCNC: NEGATIVE MG/DL
GLUCOSE UR STRIP-MCNC: NEGATIVE MG/DL
HGB UR QL STRIP.AUTO: NEGATIVE
HGB UR QL STRIP.AUTO: NEGATIVE
HOLD SPECIMEN: NORMAL
HYALINE CASTS UR QL AUTO: NORMAL /LPF
KETONES UR QL STRIP: NEGATIVE
KETONES UR QL STRIP: NEGATIVE
LEUKOCYTE ESTERASE UR QL STRIP.AUTO: NEGATIVE
LEUKOCYTE ESTERASE UR QL STRIP.AUTO: NEGATIVE
NITRITE UR QL STRIP: NEGATIVE
NITRITE UR QL STRIP: NEGATIVE
PH UR STRIP.AUTO: 7.5 [PH] (ref 5–8)
PH UR STRIP.AUTO: 7.5 [PH] (ref 5–8)
PROT ?TM UR-MCNC: 50.4 MG/DL
PROT UR QL STRIP: ABNORMAL
PROT UR QL STRIP: ABNORMAL
PROT/CREAT UR: 655.4 MG/G CREA (ref 0–200)
RBC # UR STRIP: NORMAL /HPF
REF LAB TEST METHOD: NORMAL
SP GR UR STRIP: 1.01 (ref 1–1.03)
SP GR UR STRIP: 1.01 (ref 1–1.03)
SQUAMOUS #/AREA URNS HPF: NORMAL /HPF
UROBILINOGEN UR QL STRIP: ABNORMAL
UROBILINOGEN UR QL STRIP: ABNORMAL
WBC # UR STRIP: NORMAL /HPF

## 2024-05-08 ENCOUNTER — LAB (OUTPATIENT)
Dept: LAB | Facility: HOSPITAL | Age: 74
End: 2024-05-08
Payer: MEDICARE

## 2024-05-08 DIAGNOSIS — R80.9 PROTEINURIA, UNSPECIFIED TYPE: ICD-10-CM

## 2024-05-08 LAB
COLLECT DURATION TIME UR: 24 HRS
PROT 24H UR-MRATE: 206.3 MG/24HOURS (ref 0–150)
SPECIMEN VOL 24H UR: 2750 ML

## 2024-05-08 PROCEDURE — 84156 ASSAY OF PROTEIN URINE: CPT

## 2024-05-08 PROCEDURE — 81050 URINALYSIS VOLUME MEASURE: CPT

## 2024-05-28 NOTE — PROGRESS NOTES
Lyndsey [facility]  [id number]  1950    Reason for visit: History of ovarian adenocarcinoma s/p biopsy-proven lymph node recurrence in the chest now s/p bevacizumab maintenance     History of present illness:  The patient is a 73 y.o. year old female who presents today for treatment and evaluation of the above issues.    Last cycle of bevacizumab maintenance therapy was Jan 2024 due to persistent proteinuria.  Today, she notes good appetite. She reports normal bladder and bowel function. She reports good energy and is performing ADLs.  She notes no mucositis.  Appt with renal tomorrow      Oncologic History:  Oncology/Hematology History   Ovarian cancer, bilateral   6/22/2018 Initial Diagnosis    Abnormal examination by GI for patient c/o bloating and diarrhea, sent to GYN. TVUS concerning for malignancy. CT showed multiple cystic masses on bilateral ovaries, possible omental implants, and large volume ascites. Paracentesis performed and cytology returned positive for metastatic adenocarcinoma, favor GYN origin. CA-125 at diagnosis = 907.7. Referred to Gyn Oncology.      6/28/2018 Procedure    Port-a-cath placement     7/6/2018 - 8/16/2018 Chemotherapy    OP OVARIAN PACLitaxel / CARBOplatin (Q21D)  Neoadjuvant x 3 cycles     7/16/2018 -  Chemotherapy    OP CENTRAL VENOUS ACCESS DEVICE CARE AND MAINTENANCE     9/6/2018 Imaging    Interval CT showed improvement in ascites and resolution of previous left pleural effusion     9/11/2018 Surgery    Exploratory laparotomy, EMMA/BSO, debulking to R=0, and partial rectal resection. 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 Primary tumor site thought to be left fallopian tube. Bilateral ovarian, tubal, and surface involvement. Omentum, pelvic peritoneum, colon serosa, and rectal serosa involved. Macroscopic extrapelvic peritoneal implants also found. Stage IIIB grade 3         10/3/2018 - 11/29/2018 Chemotherapy    OP OVARIAN PACLitaxel /  CARBOplatin AUC=6 / Bevacizumab 15 mg/kg  3 cycles adjuvant chemotherapy planned.     Patient chose to decline Avastin with all adjuvant cycles.     10/19/2018 Genetic Testing    Counseling and testing completed via CancerNext panel. 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 Patient does not have disease, but is a carrier.     1/3/2019 Imaging    Post-treatment CT chest, abdomen, and pelvis. No ascites, nodularity, or residual disease seen. No evidence of progression or active malignancy.      3/21/2019 Imaging    CT chest, abdomen, pelvis for diffuse abdominal pain. Study negative for recurrent disease     12/29/2020 Imaging    CT chest, abdomen, pelvis prior to port removal:  Stable examination with no CT evidence of acute intrathoracic, intra-abdominal or pelvic abnormality. No evidence of progression or metastatic disease.     9/10/2021 Imaging    CT chest, abdomen, pelvis:  Stable appearance of the chest abdomen and pelvis without acute pathology or evidence for occurrence or active metastatic disease     6/22/2022 Imaging    CT chest, abdomen, pelvis:  No evidence of metastatic disease within the abdomen or pelvis. Slowly enlarging bilateral pericardial fat pad lymph notes. Findings  are nonspecific given the slow progression since 2020, but are  concerning for metastatic disease or lymphoma.     8/22/2022 Progression    CT biopsy left pericardial lymph node. 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     9/20/2022 Molecular Testing    CARIS results:  PD-L1 positive CPS 20  ER positive 2+, 75%  MSI stable, MMR proficient, MEGAN low, TMB low  PAMELA, BRCA 1/2, RAD51 C/D all negative  MI negative  Her2/Kike negative (1+, 5%)  NEDRA 1 negative     12/13/2022 - 1/25/2023 Chemotherapy    OP OVARIAN PACLitaxel / CARBOplatin (Q21D)     2/22/2023 - 4/5/2023 Chemotherapy    OP OVARIAN PACLitaxel /  CARBOplatin AUC=6 / Bevacizumab 15 mg/kg     4/26/2023 Imaging    CT C/A/P  Impression:  1.Decreased size of masses in the pericardial fat.  2.Decreased size of previously noted enlarged right external iliac lymph node.     4/26/2023 - 1/10/2024 Chemotherapy    OP OVARIAN Bevacizumab 15 mg/kg (Maintenance)  Development of proteinuria led to slightly early discontinuation         Past Medical History:   Diagnosis Date    Hypertension     Ovarian cancer 2018    chemotherapy     Wears glasses      Past Surgical History:   Procedure Laterality Date    APPENDECTOMY      possibly with tubal (not certain)    BREAST BIOPSY Bilateral 1972    cysts removed     COLONOSCOPY      EXPLORATORY LAPAROTOMY, TOTAL ABDOMINAL HYSTERECTOMY SALPINGO OOPHORECTOMY N/A 09/11/2018    Procedure: LAPAROTOMY EXPLORATORY, TOTAL ABDOMINAL HYSTERECTOMY, BILATERAL SALPINGO OOPHORECTOMY, DEBULKING;  Surgeon: Shannan Bess MD;  Location: [facility];  Service: Gynecology    HERNIA REPAIR Right     inguinal     SUBLINGUAL SALIVARY CYST EXCISION Right 2020    at [facility]    TUBAL ABDOMINAL LIGATION       MEDICATIONS: The current medication list was reviewed with the patient and updated in the EMR this date per the Medical Assistant. Medication dosages and frequencies were confirmed to be accurate.      Allergies:  has No Known Allergies.    Social History:   Social History     Socioeconomic History    Marital status:    Tobacco Use    Smoking status: Never     Passive exposure: Never    Smokeless tobacco: Never   Vaping Use    Vaping status: Never Used   Substance and Sexual Activity    Alcohol use: No    Drug use: No    Sexual activity: Defer     Family History:    Family History   Adopted: Yes   Family history unknown: Yes     Health Maintenance:    Health Maintenance   Topic Date Due    Pneumococcal Vaccine 65+ (1 of 2 - PCV) Never done    TDAP/TD VACCINES (1 - Tdap) Never done    RSV Vaccine - Adults (1 - 1-dose 60+ series) Never done     "HEPATITIS C SCREENING  Never done    ANNUAL WELLNESS VISIT  Never done    LIPID PANEL  11/03/2021    ZOSTER VACCINE (2 of 2) 11/29/2022    DXA SCAN  03/22/2023    COVID-19 Vaccine (6 - 2023-24 season) 11/21/2023    INFLUENZA VACCINE  08/01/2024    MAMMOGRAM  07/25/2025    COLORECTAL CANCER SCREENING  04/24/2028     Review of Systems  Please refer to history of present illness, review of systems otherwise negative.    Vitals:    05/29/24 1013   BP: 146/76   Pulse: 72   Resp: 16   Temp: 97.7 °F (36.5 °C)   TempSrc: Infrared   SpO2: 95%   Weight: 47.3 kg (104 lb 6 oz)   Height: 149.9 cm (59\")   PainSc: 0-No pain         Body mass index is 21.08 kg/m².  Wt Readings from Last 3 Encounters:   05/29/24 47.3 kg (104 lb 6 oz)   04/17/24 46.9 kg (103 lb 8 oz)   02/28/24 47.9 kg (105 lb 11.2 oz)     GENERAL: Alert, well-appearing female appearing her stated age who is in no apparent distress.   HEENT: Sclera anicteric. Head normocephalic, atraumatic.   BREASTS: Deferred  CARDIOVASCULAR: Normal rate, regular rhythm.  No murmurs, rubs, gallops.  No peripheral edema.  RESPIRATORY: Lungs clear to auscultation bilaterally, normal respiratory effort on room air  GASTROINTESTINAL: No tenderness, no distinct mass, no distention  SKIN:  Warm, dry, well-perfused. All visible areas intact. Port in left side of chest accessed without issue  PSYCHIATRIC: AO x3, with appropriate affect, normal thought processes. Mood and affect appropriate.  NEUROLOGIC: No focal deficits. Moves extremities well.  MUSCULOSKELETAL: Normal gait and station.   EXTREMITIES: No cyanosis, clubbing, symmetric. Bilateral LEs nontender       PELVIC exam:    Bimanual exam appropriate without lesions    ECOG PS 0    PROCEDURES: None    Diagnostic Data:     No radiology results for the last 30 days.    Lab Results   Component Value Date    WBC 6.64 02/07/2024    HGB 14.0 02/07/2024    HCT 40.8 02/07/2024    MCV 92.3 02/07/2024     02/07/2024    NEUTROABS 3.78 " 02/07/2024    GLUCOSE 97 05/06/2024    BUN 20 05/06/2024    CREATININE 1.15 (H) 05/06/2024    EGFRIFNONA 70 11/29/2018     05/06/2024    K 4.4 05/06/2024    CL 97 (L) 05/06/2024    CO2 29.3 (H) 05/06/2024    MG 2.1 06/20/2018    PHOS 4.6 06/20/2018    CALCIUM 10.3 05/06/2024    ALBUMIN 4.2 05/06/2024    AST 23 05/06/2024    ALT 14 05/06/2024    BILITOT 0.5 05/06/2024     Lab Results   Component Value Date     17.6 01/31/2024     15.3 01/10/2024     15.2 12/20/2023     14.7 11/29/2023     17.0 11/08/2023     17.3 11/01/2023     16.1 10/11/2023     17.3 09/20/2023     17.2 08/30/2023     15.8 08/09/2023         Assessment & Plan   This is a 73 y.o. 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    Encounter Diagnoses   Name Primary?    Ovarian cancer, bilateral Yes    Metastasis to mediastinal lymph node      Recurrent Ovarian Cancer, see detailed cancer history above.  Most recently was on bevacizumab maintenance and this was discontinued due to proteinuria  -standing orders for  q 3months     PAC  -Continue flushes    Chemotherapy-induced neuropathy  -mild at baseline since chemotherapy in 2018  -stable     Proteinuria   Seen by urology, patient states this is improving    Routine Health Maintenance Screening  -see previous notes    FOLLOW UP:  3 months    Leatha Álvarez MD  Gynecologic Oncology Resident     I spent 21 minutes caring for Lyndsey on this date of service. This time includes time spent by me in the following activities: preparing for the visit, reviewing tests, performing a medically appropriate examination and/or evaluation, counseling and educating the patient/family/caregiver, ordering medications, tests, or procedures, referring and communicating with other health care professionals, documenting information in the medical record, and care coordination    Patient was seen and examined with   Preeti,  resident, who performed portions of the examination and documentation for this patient's care under my direct supervision.  I agree with the above documentation and plan.    Shannan Bess MD  05/29/24  12:33 EDT

## 2024-05-29 ENCOUNTER — OFFICE VISIT (OUTPATIENT)
Dept: GYNECOLOGIC ONCOLOGY | Facility: CLINIC | Age: 74
End: 2024-05-29
Payer: MEDICARE

## 2024-05-29 ENCOUNTER — HOSPITAL ENCOUNTER (OUTPATIENT)
Dept: ONCOLOGY | Facility: HOSPITAL | Age: 74
Discharge: HOME | End: 2024-05-29
Admitting: OBSTETRICS & GYNECOLOGY
Payer: MEDICARE

## 2024-05-29 VITALS
SYSTOLIC BLOOD PRESSURE: 146 MMHG | RESPIRATION RATE: 16 BRPM | HEART RATE: 72 BPM | BODY MASS INDEX: 21.04 KG/M2 | DIASTOLIC BLOOD PRESSURE: 76 MMHG | HEIGHT: 59 IN | OXYGEN SATURATION: 95 % | WEIGHT: 104.38 LBS | TEMPERATURE: 97.7 F

## 2024-05-29 DIAGNOSIS — C56.3 OVARIAN CANCER, BILATERAL: Primary | ICD-10-CM

## 2024-05-29 DIAGNOSIS — C77.1 METASTASIS TO MEDIASTINAL LYMPH NODE: ICD-10-CM

## 2024-05-29 LAB — CANCER AG125 SERPL QL: 17.3 U/ML (ref 0–38.1)

## 2024-05-29 PROCEDURE — 99213 OFFICE O/P EST LOW 20 MIN: CPT | Performed by: OBSTETRICS & GYNECOLOGY

## 2024-05-29 PROCEDURE — 3078F DIAST BP <80 MM HG: CPT | Performed by: OBSTETRICS & GYNECOLOGY

## 2024-05-29 PROCEDURE — 25010000002 HEPARIN LOCK FLUSH PER 10 UNITS: Performed by: OBSTETRICS & GYNECOLOGY

## 2024-05-29 PROCEDURE — 1160F RVW MEDS BY RX/DR IN RCRD: CPT | Performed by: OBSTETRICS & GYNECOLOGY

## 2024-05-29 PROCEDURE — 3077F SYST BP >= 140 MM HG: CPT | Performed by: OBSTETRICS & GYNECOLOGY

## 2024-05-29 PROCEDURE — 36591 DRAW BLOOD OFF VENOUS DEVICE: CPT

## 2024-05-29 PROCEDURE — 86304 IMMUNOASSAY TUMOR CA 125: CPT | Performed by: OBSTETRICS & GYNECOLOGY

## 2024-05-29 PROCEDURE — 1126F AMNT PAIN NOTED NONE PRSNT: CPT | Performed by: OBSTETRICS & GYNECOLOGY

## 2024-05-29 PROCEDURE — 1159F MED LIST DOCD IN RCRD: CPT | Performed by: OBSTETRICS & GYNECOLOGY

## 2024-05-29 RX ORDER — SODIUM CHLORIDE 0.9 % (FLUSH) 0.9 %
10 SYRINGE (ML) INJECTION AS NEEDED
OUTPATIENT
Start: 2024-07-01

## 2024-05-29 RX ORDER — HEPARIN SODIUM (PORCINE) LOCK FLUSH IV SOLN 100 UNIT/ML 100 UNIT/ML
500 SOLUTION INTRAVENOUS AS NEEDED
OUTPATIENT
Start: 2024-07-01

## 2024-05-29 RX ORDER — HEPARIN SODIUM (PORCINE) LOCK FLUSH IV SOLN 100 UNIT/ML 100 UNIT/ML
500 SOLUTION INTRAVENOUS AS NEEDED
Status: DISCONTINUED | OUTPATIENT
Start: 2024-05-29 | End: 2024-05-30 | Stop reason: HOSPADM

## 2024-05-29 RX ADMIN — HEPARIN 500 UNITS: 100 SYRINGE at 11:30

## 2024-07-10 ENCOUNTER — HOSPITAL ENCOUNTER (OUTPATIENT)
Dept: ONCOLOGY | Facility: HOSPITAL | Age: 74
Discharge: HOME | End: 2024-07-10
Admitting: OBSTETRICS & GYNECOLOGY
Payer: MEDICARE

## 2024-07-10 VITALS
DIASTOLIC BLOOD PRESSURE: 76 MMHG | WEIGHT: 103 LBS | BODY MASS INDEX: 20.76 KG/M2 | TEMPERATURE: 97.4 F | SYSTOLIC BLOOD PRESSURE: 141 MMHG | HEIGHT: 59 IN | HEART RATE: 75 BPM | RESPIRATION RATE: 16 BRPM

## 2024-07-10 DIAGNOSIS — C56.3 OVARIAN CANCER, BILATERAL: Primary | ICD-10-CM

## 2024-07-10 LAB — CANCER AG125 SERPL QL: 14.8 U/ML (ref 0–38.1)

## 2024-07-10 PROCEDURE — 25010000002 HEPARIN LOCK FLUSH PER 10 UNITS: Performed by: OBSTETRICS & GYNECOLOGY

## 2024-07-10 PROCEDURE — 86304 IMMUNOASSAY TUMOR CA 125: CPT | Performed by: OBSTETRICS & GYNECOLOGY

## 2024-07-10 PROCEDURE — 36591 DRAW BLOOD OFF VENOUS DEVICE: CPT

## 2024-07-10 RX ORDER — SODIUM CHLORIDE 0.9 % (FLUSH) 0.9 %
10 SYRINGE (ML) INJECTION AS NEEDED
OUTPATIENT
Start: 2024-10-01

## 2024-07-10 RX ORDER — HEPARIN SODIUM (PORCINE) LOCK FLUSH IV SOLN 100 UNIT/ML 100 UNIT/ML
500 SOLUTION INTRAVENOUS AS NEEDED
Status: DISCONTINUED | OUTPATIENT
Start: 2024-07-10 | End: 2024-07-11 | Stop reason: HOSPADM

## 2024-07-10 RX ORDER — HEPARIN SODIUM (PORCINE) LOCK FLUSH IV SOLN 100 UNIT/ML 100 UNIT/ML
500 SOLUTION INTRAVENOUS AS NEEDED
OUTPATIENT
Start: 2024-10-01

## 2024-07-10 RX ADMIN — HEPARIN 500 UNITS: 100 SYRINGE at 10:59

## 2024-07-12 ENCOUNTER — TELEPHONE (OUTPATIENT)
Dept: GYNECOLOGIC ONCOLOGY | Facility: CLINIC | Age: 74
End: 2024-07-12
Payer: MEDICARE

## 2024-07-12 NOTE — TELEPHONE ENCOUNTER
RN called patient and brief message on VM. RN notified patient that her  level was low and stable. Patient was encouraged to call back with questions or concerns.

## 2024-07-12 NOTE — TELEPHONE ENCOUNTER
----- Message from Shannan Bess sent at 7/10/2024  4:40 PM EDT -----  Please notify patient of normal Ca125 level.  Thank you  ----- Message -----  From: Lab, Background User  Sent: 7/10/2024   2:40 PM EDT  To: Shannan Bess MD

## 2024-08-01 ENCOUNTER — TELEPHONE (OUTPATIENT)
Dept: GYNECOLOGIC ONCOLOGY | Facility: CLINIC | Age: 74
End: 2024-08-01
Payer: MEDICARE

## 2024-08-01 NOTE — TELEPHONE ENCOUNTER
"  Caller: Lyndsey Tavares \"FABIAN\"    Relationship: Self    Best call back number: [phone]    Who are you requesting to speak with (clinical staff, provider,  specific staff member): CLINICAL      What was the call regarding: PT ATTENDED AN APPT 7/31 AT [facility] AND WAS INFORMED THAT IN ORDER FOR DR HOLLIS TO SEE PROG NOTES, SHE WOULD HAVE TO FAX A REQUEST TO [phone]   PT WOULD LIKE DR HOLLIS TO GO OVER THOSE OFFICE NOTES.  "

## 2024-08-28 ENCOUNTER — HOSPITAL ENCOUNTER (OUTPATIENT)
Dept: ONCOLOGY | Facility: HOSPITAL | Age: 74
Discharge: HOME | End: 2024-08-28
Admitting: OBSTETRICS & GYNECOLOGY
Payer: MEDICARE

## 2024-08-28 ENCOUNTER — OFFICE VISIT (OUTPATIENT)
Dept: GYNECOLOGIC ONCOLOGY | Facility: CLINIC | Age: 74
End: 2024-08-28
Payer: MEDICARE

## 2024-08-28 VITALS
BODY MASS INDEX: 20.84 KG/M2 | HEIGHT: 59 IN | TEMPERATURE: 98.2 F | OXYGEN SATURATION: 98 % | SYSTOLIC BLOOD PRESSURE: 144 MMHG | RESPIRATION RATE: 16 BRPM | DIASTOLIC BLOOD PRESSURE: 67 MMHG | HEART RATE: 68 BPM | WEIGHT: 103.4 LBS

## 2024-08-28 DIAGNOSIS — C56.3 OVARIAN CANCER, BILATERAL: Primary | ICD-10-CM

## 2024-08-28 DIAGNOSIS — C77.1 METASTASIS TO MEDIASTINAL LYMPH NODE: ICD-10-CM

## 2024-08-28 PROCEDURE — 1126F AMNT PAIN NOTED NONE PRSNT: CPT | Performed by: OBSTETRICS & GYNECOLOGY

## 2024-08-28 PROCEDURE — 1159F MED LIST DOCD IN RCRD: CPT | Performed by: OBSTETRICS & GYNECOLOGY

## 2024-08-28 PROCEDURE — 96523 IRRIG DRUG DELIVERY DEVICE: CPT

## 2024-08-28 PROCEDURE — G0463 HOSPITAL OUTPT CLINIC VISIT: HCPCS

## 2024-08-28 PROCEDURE — 25010000002 HEPARIN LOCK FLUSH PER 10 UNITS: Performed by: OBSTETRICS & GYNECOLOGY

## 2024-08-28 PROCEDURE — 3078F DIAST BP <80 MM HG: CPT | Performed by: OBSTETRICS & GYNECOLOGY

## 2024-08-28 PROCEDURE — 1160F RVW MEDS BY RX/DR IN RCRD: CPT | Performed by: OBSTETRICS & GYNECOLOGY

## 2024-08-28 PROCEDURE — 99213 OFFICE O/P EST LOW 20 MIN: CPT | Performed by: OBSTETRICS & GYNECOLOGY

## 2024-08-28 PROCEDURE — 3077F SYST BP >= 140 MM HG: CPT | Performed by: OBSTETRICS & GYNECOLOGY

## 2024-08-28 RX ORDER — HEPARIN SODIUM (PORCINE) LOCK FLUSH IV SOLN 100 UNIT/ML 100 UNIT/ML
500 SOLUTION INTRAVENOUS AS NEEDED
Status: CANCELLED | OUTPATIENT
Start: 2024-10-01

## 2024-08-28 RX ORDER — SODIUM CHLORIDE 0.9 % (FLUSH) 0.9 %
10 SYRINGE (ML) INJECTION AS NEEDED
OUTPATIENT
Start: 2024-10-01

## 2024-08-28 RX ORDER — SODIUM CHLORIDE 0.9 % (FLUSH) 0.9 %
10 SYRINGE (ML) INJECTION AS NEEDED
Status: CANCELLED | OUTPATIENT
Start: 2024-10-01

## 2024-08-28 RX ORDER — HEPARIN SODIUM (PORCINE) LOCK FLUSH IV SOLN 100 UNIT/ML 100 UNIT/ML
500 SOLUTION INTRAVENOUS AS NEEDED
Status: DISCONTINUED | OUTPATIENT
Start: 2024-08-28 | End: 2024-08-29 | Stop reason: HOSPADM

## 2024-08-28 RX ORDER — HEPARIN SODIUM (PORCINE) LOCK FLUSH IV SOLN 100 UNIT/ML 100 UNIT/ML
500 SOLUTION INTRAVENOUS AS NEEDED
OUTPATIENT
Start: 2024-10-01

## 2024-08-28 RX ADMIN — HEPARIN 500 UNITS: 100 SYRINGE at 11:11

## 2024-08-28 NOTE — PROGRESS NOTES
Lyndsey [facility]  [id number]  1950    Reason for visit: History of ovarian adenocarcinoma s/p biopsy-proven lymph node recurrence in the chest now s/p bevacizumab maintenance     History of present illness:  The patient is a 73 y.o. year old female who presents today for treatment and evaluation of the above issues.    Last cycle of bevacizumab maintenance therapy was Jan 2024 due to persistent proteinuria.    Today, patient presents in her usual state of health.  She has no interim complaints regarding shortness of breath, cough, wheezing, pain, changes in appetite, unexplained weight loss, changes in bowel and bladder function.   She underwent mammogram 7/30/2024 as well as ultrasound.  This was performed at [facility] ([place]).  Repeat imaging planned for 6 months.  Patient is nervous about this plan regarding her breast mass follow-up.  We talked about this for a while I think she is overall reassured.  I encouraged her to call the imaging facility back for repeat conversation.  We also discussed second opinion as an option if she is not satisfied with her care.    Oncologic History:  Oncology/Hematology History   Ovarian cancer, bilateral   6/22/2018 Initial Diagnosis    Abnormal examination by GI for patient c/o bloating and diarrhea, sent to GYN. TVUS concerning for malignancy. CT showed multiple cystic masses on bilateral ovaries, possible omental implants, and large volume ascites. Paracentesis performed and cytology returned positive for metastatic adenocarcinoma, favor GYN origin. CA-125 at diagnosis = 907.7. Referred to Gyn Oncology.      6/28/2018 Procedure    Port-a-cath placement     7/6/2018 - 8/16/2018 Chemotherapy    OP OVARIAN PACLitaxel / CARBOplatin (Q21D)  Neoadjuvant x 3 cycles     7/16/2018 -  Chemotherapy    OP CENTRAL VENOUS ACCESS DEVICE CARE AND MAINTENANCE     9/6/2018 Imaging    Interval CT showed improvement in ascites and resolution of previous left pleural effusion      9/11/2018 Surgery    Exploratory laparotomy, EMMA/BSO, debulking to R=0, and partial rectal resection. 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 Primary tumor site thought to be left fallopian tube. Bilateral ovarian, tubal, and surface involvement. Omentum, pelvic peritoneum, colon serosa, and rectal serosa involved. Macroscopic extrapelvic peritoneal implants also found. Stage IIIB grade 3         10/3/2018 - 11/29/2018 Chemotherapy    OP OVARIAN PACLitaxel / CARBOplatin AUC=6 / Bevacizumab 15 mg/kg  3 cycles adjuvant chemotherapy planned.     Patient chose to decline Avastin with all adjuvant cycles.     10/19/2018 Genetic Testing    Counseling and testing completed via CancerNext panel. 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 Patient does not have disease, but is a carrier.     1/3/2019 Imaging    Post-treatment CT chest, abdomen, and pelvis. No ascites, nodularity, or residual disease seen. No evidence of progression or active malignancy.      3/21/2019 Imaging    CT chest, abdomen, pelvis for diffuse abdominal pain. Study negative for recurrent disease     12/29/2020 Imaging    CT chest, abdomen, pelvis prior to port removal:  Stable examination with no CT evidence of acute intrathoracic, intra-abdominal or pelvic abnormality. No evidence of progression or metastatic disease.     9/10/2021 Imaging    CT chest, abdomen, pelvis:  Stable appearance of the chest abdomen and pelvis without acute pathology or evidence for occurrence or active metastatic disease     6/22/2022 Imaging    CT chest, abdomen, pelvis:  No evidence of metastatic disease within the abdomen or pelvis. Slowly enlarging bilateral pericardial fat pad lymph notes. Findings  are nonspecific given the slow progression since 2020, but are  concerning for metastatic disease or lymphoma.     8/22/2022 Progression    CT biopsy  left pericardial lymph node. 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     9/20/2022 Molecular Testing    CARIS results:  PD-L1 positive CPS 20  ER positive 2+, 75%  MSI stable, MMR proficient, MEGAN low, TMB low  PAMELA, BRCA 1/2, RAD51 C/D all negative  [place] negative  Her2/Kike negative (1+, 5%)  NEDRA 1 negative     12/13/2022 - 1/25/2023 Chemotherapy    OP OVARIAN PACLitaxel / CARBOplatin (Q21D)     2/22/2023 - 4/5/2023 Chemotherapy    OP OVARIAN PACLitaxel / CARBOplatin AUC=6 / Bevacizumab 15 mg/kg     4/26/2023 Imaging    CT C/A/P  Impression:  1.Decreased size of masses in the pericardial fat.  2.Decreased size of previously noted enlarged right external iliac lymph node.     4/26/2023 - 1/10/2024 Chemotherapy    OP OVARIAN Bevacizumab 15 mg/kg (Maintenance)  Development of proteinuria led to slightly early discontinuation       3/4/2024 Imaging    CT scan chest abdomen and pelvis  IMPRESSION:  1.No acute abnormality or metastatic disease identified within the chest, abdomen, or pelvis.  2.Subcentimeter right external iliac chain lymph node is not pathologically enlarged by size criteria, not significantly changed.   3.Please see above for additional details.       Past Medical History:   Diagnosis Date    Hypertension     Ovarian cancer 2018    chemotherapy     Wears glasses      Past Surgical History:   Procedure Laterality Date    APPENDECTOMY      possibly with tubal (not certain)    BREAST BIOPSY Bilateral 1972    cysts removed     COLONOSCOPY      EXPLORATORY LAPAROTOMY, TOTAL ABDOMINAL HYSTERECTOMY SALPINGO OOPHORECTOMY N/A 09/11/2018    Procedure: LAPAROTOMY EXPLORATORY, TOTAL ABDOMINAL HYSTERECTOMY, BILATERAL SALPINGO OOPHORECTOMY, DEBULKING;  Surgeon: Shannan Bess MD;  Location: [facility];  Service: Gynecology    HERNIA REPAIR Right     inguinal     SUBLINGUAL SALIVARY CYST EXCISION Right 2020    at [facility]    TUBAL ABDOMINAL LIGATION       MEDICATIONS: The current medication list was  "reviewed with the patient and updated in the EMR this date per the Medical Assistant. Medication dosages and frequencies were confirmed to be accurate.      Allergies:  has No Known Allergies.    Social History:   Social History     Socioeconomic History    Marital status:    Tobacco Use    Smoking status: Never     Passive exposure: Never    Smokeless tobacco: Never   Vaping Use    Vaping status: Never Used   Substance and Sexual Activity    Alcohol use: No    Drug use: No    Sexual activity: Defer     Family History:    Family History   Adopted: Yes   Family history unknown: Yes     Health Maintenance:    Health Maintenance   Topic Date Due    Pneumococcal Vaccine 65+ (1 of 2 - PCV) Never done    HEPATITIS C SCREENING  Never done    LIPID PANEL  11/03/2021    ZOSTER VACCINE (2 of 2) 11/29/2022    DXA SCAN  03/22/2023    TDAP/TD VACCINES (2 - Td or Tdap) 03/23/2023    COVID-19 Vaccine (6 - 2023-24 season) 11/21/2023    INFLUENZA VACCINE  08/01/2024    ANNUAL WELLNESS VISIT  06/10/2025    MAMMOGRAM  07/30/2026    COLORECTAL CANCER SCREENING  04/16/2034     Review of Systems  Please refer to history of present illness, review of systems otherwise negative.    Vitals:    08/28/24 1020   BP: 144/67   Pulse: 68   Resp: 16   Temp: 98.2 °F (36.8 °C)   TempSrc: Temporal   SpO2: 98%   Weight: 46.9 kg (103 lb 6.4 oz)   Height: 149.9 cm (59\")   PainSc: 0-No pain     Body mass index is 20.88 kg/m².  Wt Readings from Last 3 Encounters:   08/28/24 46.9 kg (103 lb 6.4 oz)   07/10/24 46.7 kg (103 lb)   05/29/24 47.3 kg (104 lb 6 oz)     GENERAL: Alert, well-appearing female appearing her stated age who is in no apparent distress.   HEENT: Sclera anicteric. Head normocephalic, atraumatic.   BREASTS: Deferred  CARDIOVASCULAR: Normal rate, regular rhythm.  No murmurs, rubs, gallops.  No peripheral edema.  RESPIRATORY: Lungs clear to auscultation bilaterally, normal respiratory effort on room air  GASTROINTESTINAL: No " tenderness, no distinct mass, no distention  SKIN:  Warm, dry, well-perfused. All visible areas intact. Port in left side of chest accessed without issue  PSYCHIATRIC: AO x3, with appropriate affect, normal thought processes. Mood and affect appropriate.  NEUROLOGIC: No focal deficits. Moves extremities well.  MUSCULOSKELETAL: Normal gait and station.   EXTREMITIES: No cyanosis, clubbing, symmetric. Bilateral LEs nontender  LYMPHATICS: No adenopathy in bilateral neck, supraclavicular, and groin areas  PELVIC exam: External genitalia are free from lesion.  On bimanual examination, no mass was appreciated.  Uterus cervix and adnexa are surgically absent.  Parametria are smooth.  Rectovaginal exam was deferred.    ECOG PS 0    PROCEDURES: None    Diagnostic Data:     No radiology results for the last 30 days.    Lab Results   Component Value Date    WBC 6.64 02/07/2024    HGB 14.0 02/07/2024    HCT 40.8 02/07/2024    MCV 92.3 02/07/2024     02/07/2024    NEUTROABS 3.78 02/07/2024    GLUCOSE 97 05/06/2024    BUN 20 05/06/2024    CREATININE 1.15 (H) 05/06/2024    EGFRIFNONA 70 11/29/2018     05/06/2024    K 4.4 05/06/2024    CL 97 (L) 05/06/2024    CO2 29.3 (H) 05/06/2024    MG 2.1 06/20/2018    PHOS 4.6 06/20/2018    CALCIUM 10.3 05/06/2024    ALBUMIN 4.2 05/06/2024    AST 23 05/06/2024    ALT 14 05/06/2024    BILITOT 0.5 05/06/2024     Lab Results   Component Value Date     14.8 07/10/2024     17.3 05/29/2024     17.6 01/31/2024     15.3 01/10/2024     15.2 12/20/2023     14.7 11/29/2023     17.0 11/08/2023     17.3 11/01/2023     16.1 10/11/2023     17.3 09/20/2023         Assessment & Plan   This is a 73 y.o. 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    Encounter Diagnoses   Name Primary?    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  No evidence of disease   most recently was on bevacizumab maintenance and this was discontinued due to proteinuria  -standing orders for  q 3months   -We discussed CT imaging chest every 6 months for the first couple of years.  CT scan of chest was ordered.  Could also consider abdominal screening once a year.    PAC  -Continue flushes    Chemotherapy-induced neuropathy  -mild at baseline since chemotherapy in 2018  -stable     Routine Health Maintenance Screening  -see previous notes    Orders Placed This Encounter   Procedures    CT Chest With Contrast     Standing Status:   Future     Standing Expiration Date:   8/28/2025     Scheduling Instructions:      Would prefer contrast through PAC please     Order Specific Question:   Reason for Exam:     Answer:   history of ovarian cancer with lung mets     Order Specific Question:   Release to patient     Answer:   Routine Release [[id number]]       FOLLOW UP:  3 months, alternate visits with APRN and MD    I spent 27 minutes caring for Lyndsey on this date of service. This time includes time spent by me in the following activities: preparing for the visit, reviewing tests, performing a medically appropriate examination and/or evaluation, counseling and educating the patient/family/caregiver, referring and communicating with other health care professionals, documenting information in the medical record, care coordination, and ordering test(s)      Shannan Bess MD  08/28/24  12:33 EDT

## 2024-09-16 ENCOUNTER — LAB (OUTPATIENT)
Dept: LAB | Facility: HOSPITAL | Age: 74
End: 2024-09-16
Payer: MEDICARE

## 2024-09-16 ENCOUNTER — TRANSCRIBE ORDERS (OUTPATIENT)
Dept: ADMINISTRATIVE | Facility: HOSPITAL | Age: 74
End: 2024-09-16
Payer: MEDICARE

## 2024-09-16 DIAGNOSIS — N18.32 CHRONIC KIDNEY DISEASE (CKD) STAGE G3B/A1, MODERATELY DECREASED GLOMERULAR FILTRATION RATE (GFR) BETWEEN 30-44 ML/MIN/1.73 SQUARE METER AND ALBUMINURIA CREATININE RATIO LESS THAN 30 MG/G (CMS/H*: ICD-10-CM

## 2024-09-16 DIAGNOSIS — N18.32 CHRONIC KIDNEY DISEASE (CKD) STAGE G3B/A1, MODERATELY DECREASED GLOMERULAR FILTRATION RATE (GFR) BETWEEN 30-44 ML/MIN/1.73 SQUARE METER AND ALBUMINURIA CREATININE RATIO LESS THAN 30 MG/G (CMS/H*: Primary | ICD-10-CM

## 2024-09-16 LAB
ALBUMIN SERPL-MCNC: 4.1 G/DL (ref 3.5–5.2)
ALBUMIN UR-MCNC: 9.9 MG/DL
ALBUMIN/GLOB SERPL: 1.5 G/DL
ALP SERPL-CCNC: 17 U/L (ref 39–117)
ALT SERPL W P-5'-P-CCNC: 14 U/L (ref 1–33)
ANION GAP SERPL CALCULATED.3IONS-SCNC: 9.3 MMOL/L (ref 5–15)
AST SERPL-CCNC: 19 U/L (ref 1–32)
BACTERIA UR QL AUTO: NORMAL /HPF
BILIRUB SERPL-MCNC: 0.3 MG/DL (ref 0–1.2)
BILIRUB UR QL STRIP: NEGATIVE
BUN SERPL-MCNC: 15 MG/DL (ref 8–23)
BUN/CREAT SERPL: 15 (ref 7–25)
CALCIUM SPEC-SCNC: 9.8 MG/DL (ref 8.6–10.5)
CHLORIDE SERPL-SCNC: 103 MMOL/L (ref 98–107)
CLARITY UR: CLEAR
CO2 SERPL-SCNC: 26.7 MMOL/L (ref 22–29)
COLOR UR: YELLOW
CREAT SERPL-MCNC: 1 MG/DL (ref 0.57–1)
CREAT UR-MCNC: 35.3 MG/DL
CREAT UR-MCNC: 35.3 MG/DL
EGFRCR SERPLBLD CKD-EPI 2021: 59.6 ML/MIN/1.73
GLOBULIN UR ELPH-MCNC: 2.7 GM/DL
GLUCOSE SERPL-MCNC: 92 MG/DL (ref 65–99)
GLUCOSE UR STRIP-MCNC: NEGATIVE MG/DL
HGB UR QL STRIP.AUTO: NEGATIVE
HYALINE CASTS UR QL AUTO: NORMAL /LPF
KETONES UR QL STRIP: NEGATIVE
LEUKOCYTE ESTERASE UR QL STRIP.AUTO: NEGATIVE
MICROALBUMIN/CREAT UR: 280.5 MG/G (ref 0–29)
NITRITE UR QL STRIP: NEGATIVE
PH UR STRIP.AUTO: 7 [PH] (ref 5–8)
POTASSIUM SERPL-SCNC: 4.7 MMOL/L (ref 3.5–5.2)
PROT ?TM UR-MCNC: 16 MG/DL
PROT SERPL-MCNC: 6.8 G/DL (ref 6–8.5)
PROT UR QL STRIP: ABNORMAL
PROT/CREAT UR: 453.3 MG/G CREA (ref 0–200)
RBC # UR STRIP: NORMAL /HPF
REF LAB TEST METHOD: NORMAL
SODIUM SERPL-SCNC: 139 MMOL/L (ref 136–145)
SP GR UR STRIP: 1.01 (ref 1–1.03)
SQUAMOUS #/AREA URNS HPF: NORMAL /HPF
UROBILINOGEN UR QL STRIP: ABNORMAL
WBC # UR STRIP: NORMAL /HPF

## 2024-09-16 PROCEDURE — 80053 COMPREHEN METABOLIC PANEL: CPT

## 2024-09-16 PROCEDURE — 36415 COLL VENOUS BLD VENIPUNCTURE: CPT

## 2024-09-16 PROCEDURE — 81001 URINALYSIS AUTO W/SCOPE: CPT

## 2024-09-16 PROCEDURE — 82043 UR ALBUMIN QUANTITATIVE: CPT

## 2024-09-16 PROCEDURE — 82570 ASSAY OF URINE CREATININE: CPT

## 2024-09-16 PROCEDURE — 84156 ASSAY OF PROTEIN URINE: CPT

## 2024-10-03 ENCOUNTER — HOSPITAL ENCOUNTER (OUTPATIENT)
Dept: CT IMAGING | Facility: HOSPITAL | Age: 74
Discharge: HOME | End: 2024-10-03
Admitting: OBSTETRICS & GYNECOLOGY
Payer: MEDICARE

## 2024-10-03 ENCOUNTER — APPOINTMENT (OUTPATIENT)
Dept: ONCOLOGY | Facility: HOSPITAL | Age: 74
End: 2024-10-03
Payer: MEDICARE

## 2024-10-03 DIAGNOSIS — C56.3 OVARIAN CANCER, BILATERAL: ICD-10-CM

## 2024-10-03 DIAGNOSIS — C77.1 METASTASIS TO MEDIASTINAL LYMPH NODE: ICD-10-CM

## 2024-10-03 PROCEDURE — 25510000001 IOPAMIDOL 61 % SOLUTION: Performed by: OBSTETRICS & GYNECOLOGY

## 2024-10-03 PROCEDURE — 71260 CT THORAX DX C+: CPT

## 2024-10-03 RX ORDER — IOPAMIDOL 612 MG/ML
100 INJECTION, SOLUTION INTRAVASCULAR
Status: COMPLETED | OUTPATIENT
Start: 2024-10-03 | End: 2024-10-03

## 2024-10-03 RX ADMIN — IOPAMIDOL 100 ML: 612 INJECTION, SOLUTION INTRAVENOUS at 11:32

## 2024-10-10 ENCOUNTER — HOSPITAL ENCOUNTER (OUTPATIENT)
Dept: ONCOLOGY | Facility: HOSPITAL | Age: 74
Discharge: HOME | End: 2024-10-10
Admitting: NURSE PRACTITIONER
Payer: MEDICARE

## 2024-10-10 VITALS
HEIGHT: 59 IN | WEIGHT: 100 LBS | DIASTOLIC BLOOD PRESSURE: 70 MMHG | RESPIRATION RATE: 16 BRPM | TEMPERATURE: 98 F | HEART RATE: 71 BPM | BODY MASS INDEX: 20.16 KG/M2 | SYSTOLIC BLOOD PRESSURE: 145 MMHG

## 2024-10-10 DIAGNOSIS — C56.3 OVARIAN CANCER, BILATERAL: Primary | ICD-10-CM

## 2024-10-10 DIAGNOSIS — C77.1 METASTASIS TO MEDIASTINAL LYMPH NODE: ICD-10-CM

## 2024-10-10 LAB — CANCER AG125 SERPL QL: 24.7 U/ML (ref 0–38.1)

## 2024-10-10 PROCEDURE — 36591 DRAW BLOOD OFF VENOUS DEVICE: CPT

## 2024-10-10 PROCEDURE — 86304 IMMUNOASSAY TUMOR CA 125: CPT | Performed by: OBSTETRICS & GYNECOLOGY

## 2024-10-10 PROCEDURE — 25010000002 HEPARIN LOCK FLUSH PER 10 UNITS: Performed by: NURSE PRACTITIONER

## 2024-10-10 RX ORDER — HEPARIN SODIUM (PORCINE) LOCK FLUSH IV SOLN 100 UNIT/ML 100 UNIT/ML
500 SOLUTION INTRAVENOUS AS NEEDED
Status: DISCONTINUED | OUTPATIENT
Start: 2024-10-10 | End: 2024-10-11 | Stop reason: HOSPADM

## 2024-10-10 RX ORDER — SODIUM CHLORIDE 0.9 % (FLUSH) 0.9 %
10 SYRINGE (ML) INJECTION AS NEEDED
OUTPATIENT
Start: 2025-01-01

## 2024-10-10 RX ORDER — HEPARIN SODIUM (PORCINE) LOCK FLUSH IV SOLN 100 UNIT/ML 100 UNIT/ML
500 SOLUTION INTRAVENOUS AS NEEDED
OUTPATIENT
Start: 2025-01-01

## 2024-10-10 RX ADMIN — HEPARIN 500 UNITS: 100 SYRINGE at 10:54

## 2024-10-11 ENCOUNTER — TELEPHONE (OUTPATIENT)
Dept: GYNECOLOGIC ONCOLOGY | Facility: CLINIC | Age: 74
End: 2024-10-11
Payer: MEDICARE

## 2024-10-11 NOTE — TELEPHONE ENCOUNTER
----- Message from Shannan Bess sent at 10/11/2024  9:12 AM EDT -----  I may have already sent this as an inbasket so if repetitive, never mind  Pls notify patient of ct being negative and nl   Thanks!  ----- Message -----  From: Lab, Background User  Sent: 10/10/2024   3:21 PM EDT  To: Shannan Bess MD

## 2024-10-11 NOTE — TELEPHONE ENCOUNTER
"Answered transferred call from patient.  She is inquiring about negative CT scan results.  Advised patient provider had reviewed results and we called results at provider request.  RN went over report with patient reading it.  Patient happy with what she is hearing but just anxious \"since it was in my lymph nodes the last time\".   Advise patient RN would speak to provider and call her back next week.  Patient appreciative of call and time taken to explain/speak with her.    "

## 2024-10-11 NOTE — TELEPHONE ENCOUNTER
"Caller: Lyndsey Tavares \"FABIAN\"    Relationship: Self    Best call back number: [phone]    What test was performed: CT    When was the test performed: 10/3/24    Where was the test performed:     Additional notes: BERNADINE GALVEZ HAS ADDITIONAL QUESTIONS FOR YOU REGARDING SCAN.  SHE IS CONCERNED THAT NOTHING WAS SAID ABOUT LYMPH NODES.            "

## 2024-11-20 ENCOUNTER — OFFICE VISIT (OUTPATIENT)
Dept: GYNECOLOGIC ONCOLOGY | Facility: CLINIC | Age: 74
End: 2024-11-20
Payer: MEDICARE

## 2024-11-20 ENCOUNTER — HOSPITAL ENCOUNTER (OUTPATIENT)
Dept: ONCOLOGY | Facility: HOSPITAL | Age: 74
Discharge: HOME | End: 2024-11-20
Admitting: NURSE PRACTITIONER
Payer: MEDICARE

## 2024-11-20 VITALS
SYSTOLIC BLOOD PRESSURE: 148 MMHG | HEIGHT: 59 IN | WEIGHT: 103 LBS | BODY MASS INDEX: 20.76 KG/M2 | HEART RATE: 82 BPM | RESPIRATION RATE: 16 BRPM | DIASTOLIC BLOOD PRESSURE: 71 MMHG | TEMPERATURE: 97.4 F

## 2024-11-20 VITALS
HEIGHT: 59 IN | WEIGHT: 103.1 LBS | HEART RATE: 78 BPM | SYSTOLIC BLOOD PRESSURE: 148 MMHG | RESPIRATION RATE: 18 BRPM | TEMPERATURE: 98.2 F | OXYGEN SATURATION: 99 % | DIASTOLIC BLOOD PRESSURE: 71 MMHG | BODY MASS INDEX: 20.79 KG/M2

## 2024-11-20 DIAGNOSIS — C56.3 OVARIAN CANCER, BILATERAL: Primary | ICD-10-CM

## 2024-11-20 DIAGNOSIS — C77.1 METASTASIS TO MEDIASTINAL LYMPH NODE: ICD-10-CM

## 2024-11-20 PROCEDURE — 99213 OFFICE O/P EST LOW 20 MIN: CPT | Performed by: NURSE PRACTITIONER

## 2024-11-20 PROCEDURE — 3078F DIAST BP <80 MM HG: CPT | Performed by: NURSE PRACTITIONER

## 2024-11-20 PROCEDURE — 3077F SYST BP >= 140 MM HG: CPT | Performed by: NURSE PRACTITIONER

## 2024-11-20 PROCEDURE — 25010000002 HEPARIN LOCK FLUSH PER 10 UNITS: Performed by: NURSE PRACTITIONER

## 2024-11-20 PROCEDURE — 1126F AMNT PAIN NOTED NONE PRSNT: CPT | Performed by: NURSE PRACTITIONER

## 2024-11-20 PROCEDURE — 96523 IRRIG DRUG DELIVERY DEVICE: CPT

## 2024-11-20 RX ORDER — SODIUM CHLORIDE 0.9 % (FLUSH) 0.9 %
10 SYRINGE (ML) INJECTION AS NEEDED
OUTPATIENT
Start: 2025-01-01

## 2024-11-20 RX ORDER — HEPARIN SODIUM (PORCINE) LOCK FLUSH IV SOLN 100 UNIT/ML 100 UNIT/ML
500 SOLUTION INTRAVENOUS AS NEEDED
Status: DISCONTINUED | OUTPATIENT
Start: 2024-11-20 | End: 2024-11-21 | Stop reason: HOSPADM

## 2024-11-20 RX ORDER — HEPARIN SODIUM (PORCINE) LOCK FLUSH IV SOLN 100 UNIT/ML 100 UNIT/ML
500 SOLUTION INTRAVENOUS AS NEEDED
OUTPATIENT
Start: 2025-01-01

## 2024-11-20 RX ORDER — SODIUM CHLORIDE 0.9 % (FLUSH) 0.9 %
10 SYRINGE (ML) INJECTION AS NEEDED
Status: DISCONTINUED | OUTPATIENT
Start: 2024-11-20 | End: 2024-11-21 | Stop reason: HOSPADM

## 2024-11-20 RX ADMIN — HEPARIN 500 UNITS: 100 SYRINGE at 10:15

## 2024-11-20 NOTE — PROGRESS NOTES
GYN ONCOLOGY CANCER SURVEILLANCE FOLLOW-UP    Lyndsey Tavares  [id number]  1950    Subjective   Chief Complaint: Follow up, ovarian cancer        History of present illness:   Lyndsey Tavares is a 74 y.o. year old female who is here today for ongoing surveillance of Ovarian Cancer, see Cancer History. History of ovarian adenocarcinoma s/p biopsy-proven lymph node recurrence in the chest now s/p bevacizumab maintenance. Last cycle of bevacizumab maintenance therapy was Jan 2024 due to persistent proteinuria.  has remained low/ normal since completion of tx, although it was notably higher than prev with last check on 10-10-24= 24.7. Last CT chest 10-3-24 No acute process. No findings suspicious for metastatic disease to the chest. Last CT C/A/P 3-4-24 with KLEVER in chest, abd, or pelvis.     Today, she is doing well. She denies vaginal bleeding and discharge. She does not have abdominal or pelvic pain. She is tolerating a regular diet and endorses a normal appetite. She denies nausea and vomiting. She denies early satiety and bloating. Denies any CP, SOB, lightheadedness or dizziness. She denies changes in her bowel/bladder. No dysuria, frequency, urgency, hematuria or flank pain. Reports normal energy levels.     Looking forward to an upcoming trip to [facility] with her family the day after Thanksgiving.      She underwent mammogram 7/30/2024 as well as ultrasound.  This was performed at [facility] ([place]).  Repeat imaging planned for 6 months.     Cancer History:   Oncology/Hematology History   Ovarian cancer, bilateral   6/22/2018 Initial Diagnosis    Abnormal examination by GI for patient c/o bloating and diarrhea, sent to GYN. TVUS concerning for malignancy. CT showed multiple cystic masses on bilateral ovaries, possible omental implants, and large volume ascites. Paracentesis performed and cytology returned positive for metastatic adenocarcinoma, favor GYN origin. CA-125 at diagnosis = 907.7.  Referred to Gyn Oncology.      6/28/2018 Procedure    Port-a-cath placement     7/6/2018 - 8/16/2018 Chemotherapy    OP OVARIAN PACLitaxel / CARBOplatin (Q21D)  Neoadjuvant x 3 cycles     7/16/2018 -  Chemotherapy    OP CENTRAL VENOUS ACCESS DEVICE CARE AND MAINTENANCE     9/6/2018 Imaging    Interval CT showed improvement in ascites and resolution of previous left pleural effusion     9/11/2018 Surgery    Exploratory laparotomy, EMMA/BSO, debulking to R=0, and partial rectal resection. 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 Primary tumor site thought to be left fallopian tube. Bilateral ovarian, tubal, and surface involvement. Omentum, pelvic peritoneum, colon serosa, and rectal serosa involved. Macroscopic extrapelvic peritoneal implants also found. Stage IIIB grade 3         10/3/2018 - 11/29/2018 Chemotherapy    OP OVARIAN PACLitaxel / CARBOplatin AUC=6 / Bevacizumab 15 mg/kg  3 cycles adjuvant chemotherapy planned.     Patient chose to decline Avastin with all adjuvant cycles.     10/19/2018 Genetic Testing    Counseling and testing completed via CancerNext panel. 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 Patient does not have disease, but is a carrier.     1/3/2019 Imaging    Post-treatment CT chest, abdomen, and pelvis. No ascites, nodularity, or residual disease seen. No evidence of progression or active malignancy.      3/21/2019 Imaging    CT chest, abdomen, pelvis for diffuse abdominal pain. Study negative for recurrent disease     12/29/2020 Imaging    CT chest, abdomen, pelvis prior to port removal:  Stable examination with no CT evidence of acute intrathoracic, intra-abdominal or pelvic abnormality. No evidence of progression or metastatic disease.     9/10/2021 Imaging    CT chest, abdomen, pelvis:  Stable appearance of the chest abdomen and pelvis without acute pathology or  evidence for occurrence or active metastatic disease     6/22/2022 Imaging    CT chest, abdomen, pelvis:  No evidence of metastatic disease within the abdomen or pelvis. Slowly enlarging bilateral pericardial fat pad lymph notes. Findings  are nonspecific given the slow progression since 2020, but are  concerning for metastatic disease or lymphoma.     8/22/2022 Progression    CT biopsy left pericardial lymph node. 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     9/20/2022 Molecular Testing    CARIS results:  PD-L1 positive CPS 20  ER positive 2+, 75%  MSI stable, MMR proficient, MEGAN low, TMB low  PAMELA, BRCA 1/2, RAD51 C/D all negative  [place] negative  Her2/Kike negative (1+, 5%)  NEDRA 1 negative     12/13/2022 - 1/25/2023 Chemotherapy    OP OVARIAN PACLitaxel / CARBOplatin (Q21D)     2/22/2023 - 4/5/2023 Chemotherapy    OP OVARIAN PACLitaxel / CARBOplatin AUC=6 / Bevacizumab 15 mg/kg     4/26/2023 Imaging    CT C/A/P  Impression:  1.Decreased size of masses in the pericardial fat.  2.Decreased size of previously noted enlarged right external iliac lymph node.     4/26/2023 - 1/10/2024 Chemotherapy    OP OVARIAN Bevacizumab 15 mg/kg (Maintenance)  Development of proteinuria led to slightly early discontinuation       3/4/2024 Imaging    CT scan chest abdomen and pelvis  IMPRESSION:  1.No acute abnormality or metastatic disease identified within the chest, abdomen, or pelvis.  2.Subcentimeter right external iliac chain lymph node is not pathologically enlarged by size criteria, not significantly changed.   3.Please see above for additional details.           The current medication list and allergy list were reviewed and reconciled.     Past Medical History, Past Surgical History, Social History, Family History have been reviewed and are without significant changes except as mentioned.        Objective   Physical Exam  Vital Signs: /71   Pulse 78   Temp 98.2 °F (36.8 °C) (Temporal)   Resp 18    "Ht 149.9 cm (59\")   Wt 46.8 kg (103 lb 1.6 oz)   SpO2 99%   BMI 20.82 kg/m²   Vitals:    11/20/24 1028   PainSc: 0-No pain           General Appearance:  alert, cooperative, no apparent distress and appears stated age   Neurologic/Psych: A&O x 3, gait steady, appropriate affect   HEENT:  Normocephalic, without obvious abnormality, mucous membranes moist   Abdomen:   Soft, non-tender, non-distended, and no organomegaly   Lymph nodes: No cervical, supraclavicular, inguinal adenopathy noted   Pelvic: External genitalia are free from lesion.  On speculum examination, the vaginal cuff was intact and no lesions were appreciated.  On bimanual examination, no fullness was appreciated.  Uterus, cervix, and adnexa were absent.  There was no significant tenderness.  The rectovaginal exam was deferred.       ECOG score: 1             Result Review :    Last imaging study was 10-3-24.   Tumor marker:     Date Value Ref Range Status   10/10/2024 24.7 0.0 - 38.1 U/mL Final   07/10/2024 14.8 0.0 - 38.1 U/mL Final   05/29/2024 17.3 0.0 - 38.1 U/mL Final   01/31/2024 17.6 0.0 - 38.1 U/mL Final       PHQ-9 Total Score:      Procedure Note:            Assessment and Plan:   This is a 73 y.o. 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     Recurrent Ovarian Cancer, see detailed cancer history above.  -No evidence of disease   -most recently was on bevacizumab maintenance and this was discontinued due to proteinuria  -standing orders for  q 3months   -We discussed CT imaging chest every 6 months for the first couple of years.  CT scan of chest was ordered.  Could also consider abdominal screening once a year.   -next CT C/A/P due ~3/2025   -next chest only CT due ~10/2025  -will alternate visits between TRENT and MD, f/u in 3m     PAC  -Continue flushes     Chemotherapy-induced neuropathy  -mild at baseline since chemotherapy in 2018  -stable      Routine Health Maintenance Screening  -see " previous notes  -Repeat breast imaging due late Jan 2025 ([place])    Diagnoses and all orders for this visit:    1. Ovarian cancer, bilateral (Primary)    2. 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  For patients with pain related to surgery, gynecologic malignancy or cancer treatment, the plan is as noted in the assessment/plan.  For patients with pain not related to these issues, they are to seek any further needed care from a more appropriate provider, such as PCP.    Follow-up:     Return to clinic in 3 months for ongoing cancer surveillance with Dr Bess.      Electronically signed by TRENT Chávez on 11/20/2024

## 2025-01-02 ENCOUNTER — HOSPITAL ENCOUNTER (OUTPATIENT)
Dept: ONCOLOGY | Facility: HOSPITAL | Age: 75
Discharge: HOME | End: 2025-01-02
Admitting: NURSE PRACTITIONER
Payer: MEDICARE

## 2025-01-02 VITALS
DIASTOLIC BLOOD PRESSURE: 64 MMHG | HEART RATE: 84 BPM | WEIGHT: 103 LBS | BODY MASS INDEX: 20.76 KG/M2 | TEMPERATURE: 96.6 F | HEIGHT: 59 IN | SYSTOLIC BLOOD PRESSURE: 147 MMHG | RESPIRATION RATE: 16 BRPM

## 2025-01-02 DIAGNOSIS — C56.3 OVARIAN CANCER, BILATERAL: Primary | ICD-10-CM

## 2025-01-02 PROCEDURE — 96523 IRRIG DRUG DELIVERY DEVICE: CPT

## 2025-01-02 PROCEDURE — 25010000002 HEPARIN LOCK FLUSH PER 10 UNITS: Performed by: NURSE PRACTITIONER

## 2025-01-02 RX ORDER — HEPARIN SODIUM (PORCINE) LOCK FLUSH IV SOLN 100 UNIT/ML 100 UNIT/ML
500 SOLUTION INTRAVENOUS AS NEEDED
Status: DISCONTINUED | OUTPATIENT
Start: 2025-01-02 | End: 2025-01-03 | Stop reason: HOSPADM

## 2025-01-02 RX ORDER — HEPARIN SODIUM (PORCINE) LOCK FLUSH IV SOLN 100 UNIT/ML 100 UNIT/ML
500 SOLUTION INTRAVENOUS AS NEEDED
OUTPATIENT
Start: 2025-04-01

## 2025-01-02 RX ORDER — SODIUM CHLORIDE 0.9 % (FLUSH) 0.9 %
10 SYRINGE (ML) INJECTION AS NEEDED
OUTPATIENT
Start: 2025-04-01

## 2025-01-02 RX ADMIN — HEPARIN 500 UNITS: 100 SYRINGE at 13:37

## 2025-01-13 ENCOUNTER — OFFICE VISIT (OUTPATIENT)
Dept: FAMILY MEDICINE CLINIC | Facility: CLINIC | Age: 75
End: 2025-01-13
Payer: COMMERCIAL

## 2025-01-13 VITALS
RESPIRATION RATE: 16 BRPM | WEIGHT: 103.8 LBS | HEIGHT: 59 IN | SYSTOLIC BLOOD PRESSURE: 138 MMHG | HEART RATE: 82 BPM | OXYGEN SATURATION: 96 % | TEMPERATURE: 97.5 F | BODY MASS INDEX: 20.92 KG/M2 | DIASTOLIC BLOOD PRESSURE: 80 MMHG

## 2025-01-13 DIAGNOSIS — E55.9 VITAMIN D DEFICIENCY: ICD-10-CM

## 2025-01-13 DIAGNOSIS — I10 ESSENTIAL HYPERTENSION: Primary | ICD-10-CM

## 2025-01-13 DIAGNOSIS — Z13.220 ENCOUNTER FOR LIPID SCREENING FOR CARDIOVASCULAR DISEASE: ICD-10-CM

## 2025-01-13 DIAGNOSIS — Z85.43 HISTORY OF OVARIAN CANCER: ICD-10-CM

## 2025-01-13 DIAGNOSIS — Z13.6 ENCOUNTER FOR LIPID SCREENING FOR CARDIOVASCULAR DISEASE: ICD-10-CM

## 2025-01-13 DIAGNOSIS — R80.9 PROTEINURIA, UNSPECIFIED TYPE: ICD-10-CM

## 2025-01-13 PROCEDURE — 99204 OFFICE O/P NEW MOD 45 MIN: CPT | Performed by: FAMILY MEDICINE

## 2025-01-13 RX ORDER — LISINOPRIL 10 MG/1
10 TABLET ORAL DAILY
Qty: 90 TABLET | Refills: 0 | Status: SHIPPED | OUTPATIENT
Start: 2025-01-13

## 2025-01-13 RX ORDER — MULTIPLE VITAMINS W/ MINERALS TAB 9MG-400MCG
1 TAB ORAL DAILY
COMMUNITY

## 2025-01-14 ENCOUNTER — PATIENT ROUNDING (BHMG ONLY) (OUTPATIENT)
Dept: FAMILY MEDICINE CLINIC | Facility: CLINIC | Age: 75
End: 2025-01-14
Payer: MEDICARE

## 2025-01-21 ENCOUNTER — LAB (OUTPATIENT)
Dept: FAMILY MEDICINE CLINIC | Facility: CLINIC | Age: 75
End: 2025-01-21
Payer: MEDICARE

## 2025-01-21 DIAGNOSIS — N18.32 STAGE 3B CHRONIC KIDNEY DISEASE: Primary | ICD-10-CM

## 2025-01-21 DIAGNOSIS — E55.9 VITAMIN D DEFICIENCY: ICD-10-CM

## 2025-01-21 DIAGNOSIS — Z13.6 ENCOUNTER FOR LIPID SCREENING FOR CARDIOVASCULAR DISEASE: ICD-10-CM

## 2025-01-21 DIAGNOSIS — I10 ESSENTIAL HYPERTENSION: ICD-10-CM

## 2025-01-21 DIAGNOSIS — R80.9 PROTEINURIA, UNSPECIFIED TYPE: ICD-10-CM

## 2025-01-21 DIAGNOSIS — Z13.220 ENCOUNTER FOR LIPID SCREENING FOR CARDIOVASCULAR DISEASE: ICD-10-CM

## 2025-01-21 DIAGNOSIS — Z85.43 HISTORY OF OVARIAN CANCER: ICD-10-CM

## 2025-01-21 LAB
25(OH)D3 SERPL-MCNC: 47 NG/ML (ref 30–100)
ALBUMIN SERPL-MCNC: 4.2 G/DL (ref 3.5–5.2)
ALBUMIN/GLOB SERPL: 1.5 G/DL
ALP SERPL-CCNC: 22 U/L (ref 39–117)
ALT SERPL W P-5'-P-CCNC: 19 U/L (ref 1–33)
ANION GAP SERPL CALCULATED.3IONS-SCNC: 10 MMOL/L (ref 5–15)
AST SERPL-CCNC: 23 U/L (ref 1–32)
BASOPHILS # BLD AUTO: 0.03 10*3/MM3 (ref 0–0.2)
BASOPHILS NFR BLD AUTO: 0.6 % (ref 0–1.5)
BILIRUB SERPL-MCNC: 0.4 MG/DL (ref 0–1.2)
BUN SERPL-MCNC: 12 MG/DL (ref 8–23)
BUN/CREAT SERPL: 12.8 (ref 7–25)
CALCIUM SPEC-SCNC: 9.9 MG/DL (ref 8.6–10.5)
CHLORIDE SERPL-SCNC: 101 MMOL/L (ref 98–107)
CHOLEST SERPL-MCNC: 300 MG/DL (ref 0–200)
CO2 SERPL-SCNC: 29 MMOL/L (ref 22–29)
CREAT SERPL-MCNC: 0.94 MG/DL (ref 0.57–1)
DEPRECATED RDW RBC AUTO: 42.1 FL (ref 37–54)
EGFRCR SERPLBLD CKD-EPI 2021: 63.8 ML/MIN/1.73
EOSINOPHIL # BLD AUTO: 0.17 10*3/MM3 (ref 0–0.4)
EOSINOPHIL NFR BLD AUTO: 3.6 % (ref 0.3–6.2)
ERYTHROCYTE [DISTWIDTH] IN BLOOD BY AUTOMATED COUNT: 12.3 % (ref 12.3–15.4)
GLOBULIN UR ELPH-MCNC: 2.8 GM/DL
GLUCOSE SERPL-MCNC: 94 MG/DL (ref 65–99)
HCT VFR BLD AUTO: 41.3 % (ref 34–46.6)
HDLC SERPL-MCNC: 88 MG/DL (ref 40–60)
HGB BLD-MCNC: 14 G/DL (ref 12–15.9)
IMM GRANULOCYTES # BLD AUTO: 0 10*3/MM3 (ref 0–0.05)
IMM GRANULOCYTES NFR BLD AUTO: 0 % (ref 0–0.5)
LDLC SERPL CALC-MCNC: 194 MG/DL (ref 0–100)
LDLC/HDLC SERPL: 2.17 {RATIO}
LYMPHOCYTES # BLD AUTO: 1.64 10*3/MM3 (ref 0.7–3.1)
LYMPHOCYTES NFR BLD AUTO: 34.3 % (ref 19.6–45.3)
MAGNESIUM SERPL-MCNC: 2 MG/DL (ref 1.6–2.4)
MCH RBC QN AUTO: 31.7 PG (ref 26.6–33)
MCHC RBC AUTO-ENTMCNC: 33.9 G/DL (ref 31.5–35.7)
MCV RBC AUTO: 93.7 FL (ref 79–97)
MONOCYTES # BLD AUTO: 0.46 10*3/MM3 (ref 0.1–0.9)
MONOCYTES NFR BLD AUTO: 9.6 % (ref 5–12)
NEUTROPHILS NFR BLD AUTO: 2.48 10*3/MM3 (ref 1.7–7)
NEUTROPHILS NFR BLD AUTO: 51.9 % (ref 42.7–76)
NRBC BLD AUTO-RTO: 0 /100 WBC (ref 0–0.2)
PLATELET # BLD AUTO: 230 10*3/MM3 (ref 140–450)
PMV BLD AUTO: 12.1 FL (ref 6–12)
POTASSIUM SERPL-SCNC: 4.2 MMOL/L (ref 3.5–5.2)
PROT SERPL-MCNC: 7 G/DL (ref 6–8.5)
RBC # BLD AUTO: 4.41 10*6/MM3 (ref 3.77–5.28)
SODIUM SERPL-SCNC: 140 MMOL/L (ref 136–145)
TRIGL SERPL-MCNC: 106 MG/DL (ref 0–150)
TSH SERPL DL<=0.05 MIU/L-ACNC: 2.3 UIU/ML (ref 0.27–4.2)
VIT B12 BLD-MCNC: 699 PG/ML (ref 211–946)
VLDLC SERPL-MCNC: 18 MG/DL (ref 5–40)
WBC NRBC COR # BLD AUTO: 4.78 10*3/MM3 (ref 3.4–10.8)

## 2025-01-21 PROCEDURE — 82043 UR ALBUMIN QUANTITATIVE: CPT | Performed by: INTERNAL MEDICINE

## 2025-01-21 PROCEDURE — 82306 VITAMIN D 25 HYDROXY: CPT | Performed by: FAMILY MEDICINE

## 2025-01-21 PROCEDURE — 85025 COMPLETE CBC W/AUTO DIFF WBC: CPT | Performed by: FAMILY MEDICINE

## 2025-01-21 PROCEDURE — 36415 COLL VENOUS BLD VENIPUNCTURE: CPT

## 2025-01-21 PROCEDURE — 84156 ASSAY OF PROTEIN URINE: CPT | Performed by: INTERNAL MEDICINE

## 2025-01-21 PROCEDURE — 81001 URINALYSIS AUTO W/SCOPE: CPT | Performed by: INTERNAL MEDICINE

## 2025-01-21 PROCEDURE — 83735 ASSAY OF MAGNESIUM: CPT | Performed by: INTERNAL MEDICINE

## 2025-01-21 PROCEDURE — 80053 COMPREHEN METABOLIC PANEL: CPT | Performed by: INTERNAL MEDICINE

## 2025-01-21 PROCEDURE — 80061 LIPID PANEL: CPT | Performed by: INTERNAL MEDICINE

## 2025-01-21 PROCEDURE — 82570 ASSAY OF URINE CREATININE: CPT | Performed by: INTERNAL MEDICINE

## 2025-01-21 PROCEDURE — 82607 VITAMIN B-12: CPT | Performed by: FAMILY MEDICINE

## 2025-01-21 PROCEDURE — 84443 ASSAY THYROID STIM HORMONE: CPT | Performed by: INTERNAL MEDICINE

## 2025-01-22 LAB
ALBUMIN UR-MCNC: 10.6 MG/DL
BACTERIA UR QL AUTO: NORMAL /HPF
BILIRUB UR QL STRIP: NEGATIVE
CLARITY UR: CLEAR
COLOR UR: YELLOW
CREAT UR-MCNC: 80.1 MG/DL
CREAT UR-MCNC: 80.1 MG/DL
GLUCOSE UR STRIP-MCNC: NEGATIVE MG/DL
HGB UR QL STRIP.AUTO: NEGATIVE
HYALINE CASTS UR QL AUTO: NORMAL /LPF
KETONES UR QL STRIP: NEGATIVE
LEUKOCYTE ESTERASE UR QL STRIP.AUTO: NEGATIVE
MICROALBUMIN/CREAT UR: 132.3 MG/G (ref 0–29)
NITRITE UR QL STRIP: NEGATIVE
PH UR STRIP.AUTO: 8.5 [PH] (ref 5–8)
PROT ?TM UR-MCNC: 19.7 MG/DL
PROT UR QL STRIP: ABNORMAL
PROT/CREAT UR: 245.9 MG/G CREA (ref 0–200)
RBC # UR STRIP: NORMAL /HPF
REF LAB TEST METHOD: NORMAL
SP GR UR STRIP: 1.01 (ref 1–1.03)
SQUAMOUS #/AREA URNS HPF: NORMAL /HPF
UROBILINOGEN UR QL STRIP: ABNORMAL
WBC # UR STRIP: NORMAL /HPF

## 2025-02-06 ENCOUNTER — LAB (OUTPATIENT)
Dept: FAMILY MEDICINE CLINIC | Facility: CLINIC | Age: 75
End: 2025-02-06
Payer: MEDICARE

## 2025-02-06 ENCOUNTER — OFFICE VISIT (OUTPATIENT)
Dept: FAMILY MEDICINE CLINIC | Facility: CLINIC | Age: 75
End: 2025-02-06
Payer: MEDICARE

## 2025-02-06 VITALS
HEART RATE: 86 BPM | HEIGHT: 59 IN | TEMPERATURE: 97.3 F | WEIGHT: 104.4 LBS | OXYGEN SATURATION: 94 % | DIASTOLIC BLOOD PRESSURE: 82 MMHG | SYSTOLIC BLOOD PRESSURE: 130 MMHG | BODY MASS INDEX: 21.05 KG/M2

## 2025-02-06 DIAGNOSIS — K21.9 GASTROESOPHAGEAL REFLUX DISEASE WITHOUT ESOPHAGITIS: ICD-10-CM

## 2025-02-06 DIAGNOSIS — R07.2 PRECORDIAL PAIN: Primary | ICD-10-CM

## 2025-02-06 DIAGNOSIS — R07.2 PRECORDIAL PAIN: ICD-10-CM

## 2025-02-06 DIAGNOSIS — I10 ESSENTIAL HYPERTENSION: ICD-10-CM

## 2025-02-06 PROCEDURE — 80053 COMPREHEN METABOLIC PANEL: CPT | Performed by: FAMILY MEDICINE

## 2025-02-06 PROCEDURE — 86140 C-REACTIVE PROTEIN: CPT | Performed by: FAMILY MEDICINE

## 2025-02-06 PROCEDURE — 84484 ASSAY OF TROPONIN QUANT: CPT | Performed by: FAMILY MEDICINE

## 2025-02-06 PROCEDURE — 85025 COMPLETE CBC W/AUTO DIFF WBC: CPT | Performed by: FAMILY MEDICINE

## 2025-02-06 PROCEDURE — 85652 RBC SED RATE AUTOMATED: CPT | Performed by: FAMILY MEDICINE

## 2025-02-06 PROCEDURE — 82553 CREATINE MB FRACTION: CPT | Performed by: FAMILY MEDICINE

## 2025-02-06 RX ORDER — OMEPRAZOLE 40 MG/1
40 CAPSULE, DELAYED RELEASE ORAL DAILY
Qty: 90 CAPSULE | Refills: 0 | Status: SHIPPED | OUTPATIENT
Start: 2025-02-06

## 2025-02-06 RX ORDER — LISINOPRIL 20 MG/1
20 TABLET ORAL DAILY
Qty: 90 TABLET | Refills: 1 | Status: SHIPPED | OUTPATIENT
Start: 2025-02-06

## 2025-02-06 NOTE — PROGRESS NOTES
"The 10-year ASCVD risk score (Soheila JAIME, et al., 2019) is: 20.4%    Values used to calculate the score:      Age: 74 years      Sex: Female      Is Non- : No      Diabetic: No      Tobacco smoker: No      Systolic Blood Pressure: 130 mmHg      Is BP treated: Yes      HDL Cholesterol: 88 mg/dL      Total Cholesterol: 300 mg/dL     Lyndsey Tavares     VITALS: Blood pressure 130/82, pulse 86, temperature 97.3 °F (36.3 °C), temperature source Temporal, height 149.9 cm (59\"), weight 47.4 kg (104 lb 6.4 oz), SpO2 94%, not currently breastfeeding.    Subjective  Chief Complaint  Chest Pain    Subjective          History of Present Illness:    History of Present Illness  The patient is a 74-year-old female with a medical history significant for hypertension and history of ovarian cancer who presents to the clinic for follow-up.    She reports experiencing intermittent chest pressure, which she does not associate with pain. This symptom has been present for approximately 2 weeks and is not consistently noticeable. She maintains an active lifestyle, including walking and exercising, without any associated issues. She has a port in place since December 2022, which she initially noticed was slightly red. A physician examined the port and deemed it satisfactory, advising her to monitor it. She has not experienced any complications with the port, although she occasionally encounters difficulty during blood draws. She recalls a previous port on the right side that did not cause any problems. She has not experienced any adverse reactions to her diet.    She has a longstanding history of elevated cholesterol levels, dating back at least 40 years. She has never been prescribed cholesterol-lowering medications.    She has expressed interest in obtaining a prescription for a sleep aid. She has previously discussed the use of melatonin but has concerns about potential interactions with her antihypertensive " "medications.    She recently consulted with a nephrologist and is scheduled for a follow-up visit in 6 months.    She is currently on a reduced dose of her antihypertensive medication, which was decreased from 20 mg to 10 mg. She believes she may require an increase back to the 20 mg dosage.    No complaints regarding medications.     The following portions of the patient's history were reviewed and updated as appropriate: allergies, current medications, past family history, past medical history, past social history, past surgical history and problem list.    Past Medical History  Past Medical History:   Diagnosis Date    Hypertension     Kidney disease     Ovarian cancer 2018    chemotherapy     Renal insufficiency 02/2024    Wears glasses        Surgical History  Past Surgical History:   Procedure Laterality Date    APPENDECTOMY      possibly with tubal (not certain)    BREAST BIOPSY Bilateral 1972    cysts removed     COLONOSCOPY      EXPLORATORY LAPAROTOMY, TOTAL ABDOMINAL HYSTERECTOMY SALPINGO OOPHORECTOMY N/A 09/11/2018    Procedure: LAPAROTOMY EXPLORATORY, TOTAL ABDOMINAL HYSTERECTOMY, BILATERAL SALPINGO OOPHORECTOMY, DEBULKING;  Surgeon: Shannan Bess MD;  Location: [facility];  Service: Gynecology    HERNIA REPAIR Right     inguinal     SUBLINGUAL SALIVARY CYST EXCISION Right 2020    at [facility]    TUBAL ABDOMINAL LIGATION         Family History  Family History   Adopted: Yes   Family history unknown: Yes       Social History  Social History     Socioeconomic History    Marital status:    Tobacco Use    Smoking status: Never     Passive exposure: Never    Smokeless tobacco: Never   Vaping Use    Vaping status: Never Used   Substance and Sexual Activity    Alcohol use: No    Drug use: No    Sexual activity: Not Currently     Partners: Male       Objective   Vital Signs:   /82 (BP Location: Right arm, Patient Position: Sitting)   Pulse 86   Temp 97.3 °F (36.3 °C) (Temporal)   Ht 149.9 cm (59\") "   Wt 47.4 kg (104 lb 6.4 oz)   SpO2 94%   BMI 21.09 kg/m²       Physical Exam     Physical Exam      Gen: Patient in NAD. Pleasant and answers appropriately. A&Ox3.    Skin: Warm and dry with normal turgor. No purpura, rashes, or unusual pigmentation noted. Hair is normal in appearance and distribution.    HEENT: NC/AT. No lesions noted. Conjunctiva clear, sclera nonicteric. PERRL. EOMI without nystagmus or strabismus. Fundi appear benign. No hemorrhages or exudates of eyes. Auditory canals are patent bilaterally without lesions. TMs intact,  nonerythematous, nonbulging without lesions. Nasal mucosa pink, nonerythematous, and nonedematous. Frontal and maxillary sinuses are nontender. O/P nonerythematous and moist without exudate.    Neck: Supple without lymph nodes palpated. FROM. No carotid bruits appreciated bilaterally.    Lungs: CTA B/L without rales, rhonchi, crackles, or wheezes.    Heart: RRR. S1 and S2 normal. No S3 or S4. No MRGT.    Abd: Soft, nontender,nondistended. (+)BSx4 quadrants.     Extrem: No CCE. Radial pulses 2+/4 and equal B/L. FROMx4. No bone, joint, or muscle tenderness noted.    Neuro: No focal motor/sensory deficits.    Procedures    Result Review :   The following data was reviewed by: Anais Melo MD on 02/06/2025:       Results  Laboratory Studies  Cholesterol levels are high. Vitamins, magnesium, thyroid, blood counts, liver, kidneys, electrolytes are all normal.           Assessment and Plan      Lyndsey Tavares is a 74 y.o. here for medical followup.    Assessment & Plan  1. Chest pressure.  The patient's symptoms may be indicative of gastroesophageal reflux disease (GERD) or cardiac-related issues. The possibility of the port causing irritation to the surrounding tissues, leading to reflux, was also considered. A comprehensive discussion regarding GERD was conducted. Cardiac enzyme tests will be ordered to rule out any cardiac involvement. A chest x-ray will also be  obtained for further evaluation. She will be started on a proton pump inhibitor (PPI) regimen, with the option to increase the dosage if necessary. If the cardiac enzyme tests return normal, the symptoms are likely due to indigestion.    2. Hypercholesterolemia.  Her cholesterol levels are significantly elevated, with a cardiac risk score of 20.4 percent, placing her at a high risk for cardiovascular events. The potential benefits and risks of statin therapy were discussed. She was advised to consider starting Crestor (rosuvastatin) for cholesterol management.    3. Insomnia.  She was advised to initiate melatonin therapy, starting with a dose of 5 mg, and gradually increasing to a maximum of 15 mg if necessary. She was reassured that melatonin should not adversely affect her blood pressure medications.    4. Hypertension.  Her blood pressure medication dosage will be increased from 10 mg to 20 mg. A prescription for the adjusted dosage will be sent to her pharmacy.    5. History of ovarian cancer.  Her recent lab results were within normal limits, with the exception of elevated cholesterol levels. Her vitamin levels, magnesium, thyroid function, blood counts, liver function, kidney function, and electrolyte balance were all satisfactory. Kidney function was particularly commendable given her medical history. There was no evidence of infection, as indicated by a normal white blood cell count.    PROCEDURE  The patient had a port placed in December 2022.      BMI is within normal parameters. No other follow-up for BMI required.       Patient or patient representative verbalized consent for the use of Ambient Listening during the visit with  Anais Melo MD for chart documentation. 2/23/2025  23:48 EST        Follow Up   Return in about 4 weeks (around 3/6/2025), or LABS, XRAY.  Findings and plans discussed with patient who verbalizes understanding and agreement. Will followup with patient once results are in.  Patient was given instructions and counseling regarding her condition or for health maintenance advice. Please see specific information pulled into the AVS if appropriate.       Anais Melo MD

## 2025-02-07 LAB
ALBUMIN SERPL-MCNC: 4.1 G/DL (ref 3.5–5.2)
ALBUMIN/GLOB SERPL: 1.4 G/DL
ALP SERPL-CCNC: 23 U/L (ref 39–117)
ALT SERPL W P-5'-P-CCNC: 15 U/L (ref 1–33)
ANION GAP SERPL CALCULATED.3IONS-SCNC: 11 MMOL/L (ref 5–15)
AST SERPL-CCNC: 21 U/L (ref 1–32)
BASOPHILS # BLD AUTO: 0.03 10*3/MM3 (ref 0–0.2)
BASOPHILS NFR BLD AUTO: 0.4 % (ref 0–1.5)
BILIRUB SERPL-MCNC: 0.2 MG/DL (ref 0–1.2)
BUN SERPL-MCNC: 18 MG/DL (ref 8–23)
BUN/CREAT SERPL: 17.5 (ref 7–25)
CALCIUM SPEC-SCNC: 9.5 MG/DL (ref 8.6–10.5)
CHLORIDE SERPL-SCNC: 100 MMOL/L (ref 98–107)
CK MB SERPL-CCNC: 1.33 NG/ML
CO2 SERPL-SCNC: 29 MMOL/L (ref 22–29)
CREAT SERPL-MCNC: 1.03 MG/DL (ref 0.57–1)
CRP SERPL-MCNC: 0.63 MG/DL (ref 0–0.5)
DEPRECATED RDW RBC AUTO: 41.6 FL (ref 37–54)
EGFRCR SERPLBLD CKD-EPI 2021: 57.2 ML/MIN/1.73
EOSINOPHIL # BLD AUTO: 0.1 10*3/MM3 (ref 0–0.4)
EOSINOPHIL NFR BLD AUTO: 1.2 % (ref 0.3–6.2)
ERYTHROCYTE [DISTWIDTH] IN BLOOD BY AUTOMATED COUNT: 12.1 % (ref 12.3–15.4)
ERYTHROCYTE [SEDIMENTATION RATE] IN BLOOD: 20 MM/HR (ref 0–30)
GLOBULIN UR ELPH-MCNC: 2.9 GM/DL
GLUCOSE SERPL-MCNC: 85 MG/DL (ref 65–99)
HCT VFR BLD AUTO: 40.9 % (ref 34–46.6)
HGB BLD-MCNC: 13.9 G/DL (ref 12–15.9)
IMM GRANULOCYTES # BLD AUTO: 0.02 10*3/MM3 (ref 0–0.05)
IMM GRANULOCYTES NFR BLD AUTO: 0.2 % (ref 0–0.5)
LYMPHOCYTES # BLD AUTO: 1.79 10*3/MM3 (ref 0.7–3.1)
LYMPHOCYTES NFR BLD AUTO: 22.2 % (ref 19.6–45.3)
MCH RBC QN AUTO: 31.7 PG (ref 26.6–33)
MCHC RBC AUTO-ENTMCNC: 34 G/DL (ref 31.5–35.7)
MCV RBC AUTO: 93.4 FL (ref 79–97)
MONOCYTES # BLD AUTO: 0.65 10*3/MM3 (ref 0.1–0.9)
MONOCYTES NFR BLD AUTO: 8.1 % (ref 5–12)
NEUTROPHILS NFR BLD AUTO: 5.47 10*3/MM3 (ref 1.7–7)
NEUTROPHILS NFR BLD AUTO: 67.9 % (ref 42.7–76)
NRBC BLD AUTO-RTO: 0 /100 WBC (ref 0–0.2)
PLATELET # BLD AUTO: 260 10*3/MM3 (ref 140–450)
PMV BLD AUTO: 11.6 FL (ref 6–12)
POTASSIUM SERPL-SCNC: 4.1 MMOL/L (ref 3.5–5.2)
PROT SERPL-MCNC: 7 G/DL (ref 6–8.5)
RBC # BLD AUTO: 4.38 10*6/MM3 (ref 3.77–5.28)
SODIUM SERPL-SCNC: 140 MMOL/L (ref 136–145)
TROPONIN T SERPL HS-MCNC: 7 NG/L
WBC NRBC COR # BLD AUTO: 8.06 10*3/MM3 (ref 3.4–10.8)

## 2025-02-18 ENCOUNTER — OFFICE VISIT (OUTPATIENT)
Dept: GYNECOLOGIC ONCOLOGY | Facility: CLINIC | Age: 75
End: 2025-02-18
Payer: MEDICARE

## 2025-02-18 ENCOUNTER — HOSPITAL ENCOUNTER (OUTPATIENT)
Dept: ONCOLOGY | Facility: HOSPITAL | Age: 75
Discharge: HOME | End: 2025-02-18
Admitting: NURSE PRACTITIONER
Payer: MEDICARE

## 2025-02-18 VITALS
DIASTOLIC BLOOD PRESSURE: 69 MMHG | OXYGEN SATURATION: 97 % | TEMPERATURE: 97.3 F | HEART RATE: 68 BPM | SYSTOLIC BLOOD PRESSURE: 144 MMHG | RESPIRATION RATE: 18 BRPM | WEIGHT: 103.1 LBS | BODY MASS INDEX: 20.79 KG/M2 | HEIGHT: 59 IN

## 2025-02-18 VITALS
HEIGHT: 59 IN | DIASTOLIC BLOOD PRESSURE: 68 MMHG | BODY MASS INDEX: 20.56 KG/M2 | HEART RATE: 74 BPM | RESPIRATION RATE: 16 BRPM | WEIGHT: 102 LBS | SYSTOLIC BLOOD PRESSURE: 141 MMHG | TEMPERATURE: 96.8 F

## 2025-02-18 DIAGNOSIS — C77.1 METASTASIS TO MEDIASTINAL LYMPH NODE: ICD-10-CM

## 2025-02-18 DIAGNOSIS — C56.3 OVARIAN CANCER, BILATERAL: Primary | ICD-10-CM

## 2025-02-18 DIAGNOSIS — E78.00 PURE HYPERCHOLESTEROLEMIA: ICD-10-CM

## 2025-02-18 LAB — CANCER AG125 SERPL QL: 20.3 U/ML (ref 0–38.1)

## 2025-02-18 PROCEDURE — 1159F MED LIST DOCD IN RCRD: CPT | Performed by: OBSTETRICS & GYNECOLOGY

## 2025-02-18 PROCEDURE — 36591 DRAW BLOOD OFF VENOUS DEVICE: CPT

## 2025-02-18 PROCEDURE — 86304 IMMUNOASSAY TUMOR CA 125: CPT | Performed by: OBSTETRICS & GYNECOLOGY

## 2025-02-18 PROCEDURE — 99213 OFFICE O/P EST LOW 20 MIN: CPT | Performed by: OBSTETRICS & GYNECOLOGY

## 2025-02-18 PROCEDURE — 3078F DIAST BP <80 MM HG: CPT | Performed by: OBSTETRICS & GYNECOLOGY

## 2025-02-18 PROCEDURE — 1160F RVW MEDS BY RX/DR IN RCRD: CPT | Performed by: OBSTETRICS & GYNECOLOGY

## 2025-02-18 PROCEDURE — 3077F SYST BP >= 140 MM HG: CPT | Performed by: OBSTETRICS & GYNECOLOGY

## 2025-02-18 PROCEDURE — 25010000002 HEPARIN LOCK FLUSH PER 10 UNITS: Performed by: NURSE PRACTITIONER

## 2025-02-18 PROCEDURE — 1126F AMNT PAIN NOTED NONE PRSNT: CPT | Performed by: OBSTETRICS & GYNECOLOGY

## 2025-02-18 RX ORDER — HEPARIN SODIUM (PORCINE) LOCK FLUSH IV SOLN 100 UNIT/ML 100 UNIT/ML
500 SOLUTION INTRAVENOUS AS NEEDED
OUTPATIENT
Start: 2025-04-01

## 2025-02-18 RX ORDER — SODIUM CHLORIDE 0.9 % (FLUSH) 0.9 %
10 SYRINGE (ML) INJECTION AS NEEDED
OUTPATIENT
Start: 2025-04-01

## 2025-02-18 RX ORDER — SODIUM CHLORIDE 0.9 % (FLUSH) 0.9 %
10 SYRINGE (ML) INJECTION AS NEEDED
Status: CANCELLED | OUTPATIENT
Start: 2025-04-01

## 2025-02-18 RX ORDER — ROSUVASTATIN CALCIUM 5 MG/1
5 TABLET, COATED ORAL DAILY
Qty: 90 TABLET | Refills: 0 | Status: SHIPPED | OUTPATIENT
Start: 2025-02-18

## 2025-02-18 RX ORDER — HEPARIN SODIUM (PORCINE) LOCK FLUSH IV SOLN 100 UNIT/ML 100 UNIT/ML
500 SOLUTION INTRAVENOUS AS NEEDED
Status: DISCONTINUED | OUTPATIENT
Start: 2025-02-18 | End: 2025-02-19 | Stop reason: HOSPADM

## 2025-02-18 RX ADMIN — HEPARIN 500 UNITS: 100 SYRINGE at 09:55

## 2025-02-18 NOTE — PROGRESS NOTES
Lyndsey [facility]  [id number]  1950    Reason for visit: History of ovarian adenocarcinoma s/p biopsy-proven lymph node recurrence in the chest now s/p bevacizumab maintenance     History of present illness:  The patient is a 74 y.o. year old female who presents today for treatment and evaluation of the above issues.    Last cycle of bevacizumab maintenance therapy was Jan 2024 due to persistent proteinuria.    She underwent mammogram 7/30/2024 as well as ultrasound.  This was performed at [facility] ([place]).  Repeat imaging planned for 6 months.  Today, patient notes new chest pressure.  Saw her MD in [place] - had EKG, CXR, negative W/U.  Was maybe having reflux due to diet - modified and condition has improved.  Did not want medication for this. Has questions about cholesterol pressure medication. Appetite, weight stable.     Oncologic History:  Oncology/Hematology History   Ovarian cancer, bilateral   6/22/2018 Initial Diagnosis    Abnormal examination by GI for patient c/o bloating and diarrhea, sent to GYN. TVUS concerning for malignancy. CT showed multiple cystic masses on bilateral ovaries, possible omental implants, and large volume ascites. Paracentesis performed and cytology returned positive for metastatic adenocarcinoma, favor GYN origin. CA-125 at diagnosis = 907.7. Referred to Gyn Oncology.      6/28/2018 Procedure    Port-a-cath placement     7/6/2018 - 8/16/2018 Chemotherapy    OP OVARIAN PACLitaxel / CARBOplatin (Q21D)  Neoadjuvant x 3 cycles     7/16/2018 -  Chemotherapy    OP CENTRAL VENOUS ACCESS DEVICE CARE AND MAINTENANCE     9/6/2018 Imaging    Interval CT showed improvement in ascites and resolution of previous left pleural effusion     9/11/2018 Surgery    Exploratory laparotomy, EMMA/BSO, debulking to R=0, and partial rectal resection. 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 Primary tumor site thought to be left fallopian tube. Bilateral ovarian, tubal, and  surface involvement. Omentum, pelvic peritoneum, colon serosa, and rectal serosa involved. Macroscopic extrapelvic peritoneal implants also found. Stage IIIB grade 3         10/3/2018 - 11/29/2018 Chemotherapy    OP OVARIAN PACLitaxel / CARBOplatin AUC=6 / Bevacizumab 15 mg/kg  3 cycles adjuvant chemotherapy planned.     Patient chose to decline Avastin with all adjuvant cycles.     10/19/2018 Genetic Testing    Counseling and testing completed via [employer] panel. 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 Patient does not have disease, but is a carrier.     1/3/2019 Imaging    Post-treatment CT chest, abdomen, and pelvis. No ascites, nodularity, or residual disease seen. No evidence of progression or active malignancy.      3/21/2019 Imaging    CT chest, abdomen, pelvis for diffuse abdominal pain. Study negative for recurrent disease     12/29/2020 Imaging    CT chest, abdomen, pelvis prior to port removal:  Stable examination with no CT evidence of acute intrathoracic, intra-abdominal or pelvic abnormality. No evidence of progression or metastatic disease.     9/10/2021 Imaging    CT chest, abdomen, pelvis:  Stable appearance of the chest abdomen and pelvis without acute pathology or evidence for occurrence or active metastatic disease     6/22/2022 Imaging    CT chest, abdomen, pelvis:  No evidence of metastatic disease within the abdomen or pelvis. Slowly enlarging bilateral pericardial fat pad lymph notes. Findings  are nonspecific given the slow progression since 2020, but are  concerning for metastatic disease or lymphoma.     8/22/2022 Progression    CT biopsy left pericardial lymph node. 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     9/20/2022 Molecular Testing    CARIS results:  PD-L1 positive CPS 20  ER positive 2+, 75%  MSI stable, MMR proficient, MEGAN low, TMB low  PAMELA, BRCA 1/2, RAD51  C/D all negative  KS negative  Her2/Kike negative (1+, 5%)  NEDRA 1 negative     12/13/2022 - 1/25/2023 Chemotherapy    OP OVARIAN PACLitaxel / CARBOplatin (Q21D)     2/22/2023 - 4/5/2023 Chemotherapy    OP OVARIAN PACLitaxel / CARBOplatin AUC=6 / Bevacizumab 15 mg/kg     4/26/2023 Imaging    CT C/A/P  Impression:  1.Decreased size of masses in the pericardial fat.  2.Decreased size of previously noted enlarged right external iliac lymph node.     4/26/2023 - 1/10/2024 Chemotherapy    OP OVARIAN Bevacizumab 15 mg/kg (Maintenance)  Development of proteinuria led to slightly early discontinuation       3/4/2024 Imaging    CT scan chest abdomen and pelvis  IMPRESSION:  1.No acute abnormality or metastatic disease identified within the chest, abdomen, or pelvis.  2.Subcentimeter right external iliac chain lymph node is not pathologically enlarged by size criteria, not significantly changed.   3.Please see above for additional details.       Past Medical History:   Diagnosis Date    Hypertension     Kidney disease     Ovarian cancer 2018    chemotherapy     Renal insufficiency 02/2024    Wears glasses      Past Surgical History:   Procedure Laterality Date    APPENDECTOMY      possibly with tubal (not certain)    BREAST BIOPSY Bilateral 1972    cysts removed     COLONOSCOPY      EXPLORATORY LAPAROTOMY, TOTAL ABDOMINAL HYSTERECTOMY SALPINGO OOPHORECTOMY N/A 09/11/2018    Procedure: LAPAROTOMY EXPLORATORY, TOTAL ABDOMINAL HYSTERECTOMY, BILATERAL SALPINGO OOPHORECTOMY, DEBULKING;  Surgeon: Shannan Bess MD;  Location: [facility];  Service: Gynecology    HERNIA REPAIR Right     inguinal     SUBLINGUAL SALIVARY CYST EXCISION Right 2020    at [facility]    TUBAL ABDOMINAL LIGATION       MEDICATIONS: The current medication list was reviewed with the patient and updated in the EMR this date per the Medical Assistant. Medication dosages and frequencies were confirmed to be accurate.      Allergies:  has No Known Allergies.    Social  "History:   Social History     Socioeconomic History    Marital status:    Tobacco Use    Smoking status: Never     Passive exposure: Never    Smokeless tobacco: Never   Vaping Use    Vaping status: Never Used   Substance and Sexual Activity    Alcohol use: No    Drug use: No    Sexual activity: Not Currently     Partners: Male     Family History:    Family History   Adopted: Yes   Family history unknown: Yes     Health Maintenance:    Health Maintenance   Topic Date Due    DXA SCAN  03/22/2023    TDAP/TD VACCINES (2 - Td or Tdap) 03/23/2023    COVID-19 Vaccine (7 - 2024-25 season) 01/07/2025    HEPATITIS C SCREENING  01/13/2026 (Originally 6/27/2018)    ANNUAL WELLNESS VISIT  06/10/2025    LIPID PANEL  01/21/2026    MAMMOGRAM  09/18/2026    COLORECTAL CANCER SCREENING  04/16/2034    INFLUENZA VACCINE  Completed    Pneumococcal Vaccine 50+  Completed    ZOSTER VACCINE  Completed     Review of Systems  Please refer to history of present illness, review of systems otherwise negative.    Vitals:    02/18/25 1021   BP: 144/69   Pulse: 68   Resp: 18   Temp: 97.3 °F (36.3 °C)   TempSrc: Temporal   SpO2: 97%   Weight: 46.8 kg (103 lb 1.6 oz)   Height: 149.9 cm (59\")   PainSc: 0-No pain       Body mass index is 20.82 kg/m².  Wt Readings from Last 3 Encounters:   02/18/25 46.3 kg (102 lb)   02/18/25 46.8 kg (103 lb 1.6 oz)   02/06/25 47.4 kg (104 lb 6.4 oz)     GENERAL: Alert, well-appearing female appearing her stated age who is in no apparent distress.   HEENT: Sclera anicteric. Head normocephalic, atraumatic.   BREASTS: Deferred  CARDIOVASCULAR: Normal rate, regular rhythm.  No murmurs, rubs, gallops.  No peripheral edema.  RESPIRATORY: Lungs clear to auscultation bilaterally, normal respiratory effort on room air  GASTROINTESTINAL: No tenderness, no distinct mass, no distention  SKIN:  Warm, dry, well-perfused. All visible areas intact. Port in left side of chest accessed without issue  PSYCHIATRIC: AO x3, with " appropriate affect, normal thought processes. Mood and affect appropriate.  NEUROLOGIC: No focal deficits. Moves extremities well.  MUSCULOSKELETAL: Normal gait and station.   EXTREMITIES: No cyanosis, clubbing, symmetric. Bilateral LEs nontender  LYMPHATICS: No adenopathy in bilateral neck, supraclavicular, and groin areas  PELVIC exam: External genitalia are free from lesion.  On bimanual examination, no mass was appreciated.  Uterus cervix and adnexa are surgically absent.  Parametria are smooth.  Rectovaginal exam was deferred.    ECOG PS 0    PROCEDURES: None    Diagnostic Data:     XR Chest 2 View    Result Date: 2/7/2025    Stable chest. No acute changes.  This report was finalized on 2/7/2025 9:51 AM by Dr. Greg Malloy MD.       Lab Results   Component Value Date    WBC 8.06 02/06/2025    HGB 13.9 02/06/2025    HCT 40.9 02/06/2025    MCV 93.4 02/06/2025     02/06/2025    NEUTROABS 5.47 02/06/2025    GLUCOSE 85 02/06/2025    BUN 18 02/06/2025    CREATININE 1.03 (H) 02/06/2025    EGFRIFNONA 70 11/29/2018     02/06/2025    K 4.1 02/06/2025     02/06/2025    CO2 29.0 02/06/2025    MG 2.0 01/21/2025    PHOS 4.6 06/20/2018    CALCIUM 9.5 02/06/2025    ALBUMIN 4.1 02/06/2025    AST 21 02/06/2025    ALT 15 02/06/2025    BILITOT 0.2 02/06/2025     Lab Results   Component Value Date     20.3 02/18/2025     24.7 10/10/2024     14.8 07/10/2024     17.3 05/29/2024     17.6 01/31/2024     15.3 01/10/2024     15.2 12/20/2023     14.7 11/29/2023     17.0 11/08/2023     17.3 11/01/2023         Assessment & Plan   This is a 74 y.o. 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    Encounter Diagnoses   Name Primary?    Ovarian cancer, bilateral Yes    Metastasis to mediastinal lymph node     Pure hypercholesterolemia      Recurrent Ovarian Cancer, see detailed cancer history above.  No evidence of disease   most recently was on  bevacizumab maintenance and this was discontinued due to proteinuria  -standing orders for  q 3months   -We discussed CT imaging chest every 6 months for the first couple of years.  CT scan of chest was ordered to be done prior to next visit.  Could also consider abdominal screening once a year.    PAC  -Continue flushes    Chemotherapy-induced neuropathy  -mild at baseline since chemotherapy in 2018  -stable     Routine Health Maintenance Screening  -see previous notes    No orders of the defined types were placed in this encounter.    FOLLOW UP:  3 months, alternate visits with APRN and MD    I spent 23 minutes caring for Lyndsey on this date of service. This time includes time spent by me in the following activities: preparing for the visit, reviewing tests, performing a medically appropriate examination and/or evaluation, counseling and educating the patient/family/caregiver, referring and communicating with other health care professionals, documenting information in the medical record, care coordination, and ordering test(s)    Shannan Bess MD  02/19/25  12:33 EDT

## 2025-03-12 ENCOUNTER — OFFICE VISIT (OUTPATIENT)
Dept: FAMILY MEDICINE CLINIC | Facility: CLINIC | Age: 75
End: 2025-03-12
Payer: MEDICARE

## 2025-03-12 VITALS
HEART RATE: 71 BPM | RESPIRATION RATE: 16 BRPM | WEIGHT: 102.2 LBS | DIASTOLIC BLOOD PRESSURE: 70 MMHG | TEMPERATURE: 97.3 F | HEIGHT: 59 IN | BODY MASS INDEX: 20.6 KG/M2 | SYSTOLIC BLOOD PRESSURE: 116 MMHG | OXYGEN SATURATION: 96 %

## 2025-03-12 DIAGNOSIS — K21.9 GASTROESOPHAGEAL REFLUX DISEASE WITHOUT ESOPHAGITIS: Primary | ICD-10-CM

## 2025-03-12 DIAGNOSIS — N18.32 STAGE 3B CHRONIC KIDNEY DISEASE: ICD-10-CM

## 2025-03-12 DIAGNOSIS — R07.2 PRECORDIAL PAIN: ICD-10-CM

## 2025-03-12 RX ORDER — LISINOPRIL 10 MG/1
10 TABLET ORAL DAILY PRN
Start: 2025-03-12

## 2025-03-12 RX ORDER — ASCORBIC ACID 500 MG
500 TABLET ORAL DAILY
Status: SHIPPED | COMMUNITY
Start: 2025-03-12

## 2025-03-12 RX ORDER — MULTIVIT WITH MINERALS/LUTEIN
1000 TABLET ORAL DAILY
Start: 2025-03-12

## 2025-03-12 RX ORDER — VITAMIN K2 90 MCG
1 CAPSULE ORAL DAILY
Qty: 90 CAPSULE | Refills: 0
Start: 2025-03-12

## 2025-03-25 ENCOUNTER — HOSPITAL ENCOUNTER (OUTPATIENT)
Dept: ONCOLOGY | Facility: HOSPITAL | Age: 75
Discharge: HOME | End: 2025-03-25
Admitting: NURSE PRACTITIONER
Payer: MEDICARE

## 2025-03-25 VITALS
BODY MASS INDEX: 20.96 KG/M2 | HEIGHT: 59 IN | WEIGHT: 104 LBS | SYSTOLIC BLOOD PRESSURE: 150 MMHG | DIASTOLIC BLOOD PRESSURE: 83 MMHG | TEMPERATURE: 96.8 F | HEART RATE: 73 BPM

## 2025-03-25 DIAGNOSIS — C56.3 OVARIAN CANCER, BILATERAL: Primary | ICD-10-CM

## 2025-03-25 PROCEDURE — 96523 IRRIG DRUG DELIVERY DEVICE: CPT

## 2025-03-25 PROCEDURE — 25010000002 HEPARIN LOCK FLUSH PER 10 UNITS: Performed by: NURSE PRACTITIONER

## 2025-03-25 RX ORDER — SODIUM CHLORIDE 0.9 % (FLUSH) 0.9 %
10 SYRINGE (ML) INJECTION AS NEEDED
OUTPATIENT
Start: 2025-04-01

## 2025-03-25 RX ORDER — HEPARIN SODIUM (PORCINE) LOCK FLUSH IV SOLN 100 UNIT/ML 100 UNIT/ML
500 SOLUTION INTRAVENOUS AS NEEDED
Status: DISCONTINUED | OUTPATIENT
Start: 2025-03-25 | End: 2025-03-26 | Stop reason: HOSPADM

## 2025-03-25 RX ORDER — HEPARIN SODIUM (PORCINE) LOCK FLUSH IV SOLN 100 UNIT/ML 100 UNIT/ML
500 SOLUTION INTRAVENOUS AS NEEDED
OUTPATIENT
Start: 2025-04-01

## 2025-03-25 RX ADMIN — HEPARIN 500 UNITS: 100 SYRINGE at 10:30

## 2025-03-31 NOTE — TELEPHONE ENCOUNTER
"  Caller: Lyndsey Tavares \"FABIAN\"    Relationship: Self    Best call back number: [phone]     Requested Prescriptions:   Requested Prescriptions     Pending Prescriptions Disp Refills    lisinopril (PRINIVIL,ZESTRIL) 10 MG tablet       Sig: Take 1 tablet by mouth Daily As Needed (elevated blood pressures).        Pharmacy where request should be sent: [facility] [id number] [facility] AT [address] [place] [address] - [phone]  - [phone]     Last office visit with prescribing clinician: 3/12/2025   Last telemedicine visit with prescribing clinician: Visit date not found   Next office visit with prescribing clinician: 4/21/2025     Additional details provided by patient:     Does the patient have less than a 3 day supply:  [] Yes  [x] No    Would you like a call back once the refill request has been completed: [] Yes [x] No    If the office needs to give you a call back, can they leave a voicemail: [] Yes [x] No    Arleen Driver Rep   03/31/25 14:53 EDT         "

## 2025-03-31 NOTE — TELEPHONE ENCOUNTER
Spoke with patient due to last note having it discontinued with parameters that she could take PRN,she stated that she did not stop taking it that she is taking 10 mg daily & does not feel comfortable stopping it until she talks with her Nephrologist,reports the highest her BP has been is 124/69 the lowest its been is 104/62.   Trauma Progress Note    Events of the last 24 hrs     Subjective:   Pt had no overnight events   Complained of headache yesterday that was managed with firocet. Relates some tingling to left eyebrow and blurry vision to left eye. Denies dizziness at bedside.  Denies any abdominal pain, N/V   Aware of situation, location and next steps in treatment; ready to leave.    Objective:  Vitals with min/max:  Vital Last Value 24 Hour Range   Temperature 97.7 °F (36.5 °C) (04/21/22 0421) Temp  Min: 97.7 °F (36.5 °C)  Max: 98.2 °F (36.8 °C)   Pulse (!) 58 (04/21/22 0421) Pulse  Min: 58  Max: 62   Respiratory 16 (04/21/22 0421) Resp  Min: 16  Max: 16   Non-Invasive  Blood Pressure (!) 145/76 (04/21/22 0421) BP  Min: 139/73  Max: 162/76   Pulse Oximetry 95 % (04/21/22 0421) SpO2  Min: 95 %  Max: 99 %   Arterial   Blood Pressure 137/57 (04/19/22 0700) No data recorded       Intake/Output Summary (Last 24 hours) at 4/21/2022 0628  Last data filed at 4/21/2022 0600  Gross per 24 hour   Intake 1080 ml   Output 1000 ml   Net 80 ml       Gen: NAD AxOx3  HEENT: Normocephalic, atraumatic, EOM intact, no cervical adenopathy   CV: Regular rhythm and was to be bradycardic and in NAD.  Pulm: CTAB, non-labored respirations.  Abd: Soft, non-tender, non-distended, no signs of diffuse peritonitis   Extrem: Strength and sensation grossly   Neuro: GCS 15, neurologically intact with no deficets noted. Patient does speak a bit slow however he is oriented and answers questions appropriately.      Results Review:  Recent Labs   Lab 04/21/22 0457 04/19/22 0209 04/18/22  0324   WBC 8.3 9.9 13.2*   RBC 4.98 4.68 4.88   HGB 15.3 14.4 15.0   HCT 44.6 42.1 43.1    176 200     Recent Labs   Lab 04/21/22 0457 04/19/22 0209 04/18/22  0324 04/17/22  1535   SODIUM 138 142 142 140   CHLORIDE 105 109* 107 104   CO2 27 25 23 27   BUN 15 20 22* 16   CREATININE 0.95 0.98 0.94 0.83   CALCIUM 8.7 8.4 8.7 9.2   ALBUMIN  --   --   --  3.8   BILIRUBIN  --   --    --  0.6   ALKPT  --   --   --  92   GPT  --   --   --  39   AST  --   --   --  25   GLUCOSE 97 100* 134* 123*     Recent Labs   Lab 04/17/22  1535   PTT 25   PT 10.9   INR 1.0       CT HEAD WO CONTRAST    Result Date: 4/19/2022  CT BRAIN WITHOUT CONTRAST CLINICAL INDICATION: Trauma follow-up. COMPARISON: CT head 04/18/2022. TECHNIQUE: Multiple axial images of the head were obtained from the base of the skull to the vertex. Automated exposure control employed as radiation dose reduction strategy on this patient. FINDINGS: The gray-white matter differentiation is maintained.  Stable left frontal and temporal lobe parenchymal contusions.  Stable sulcal based hyperdensity in the left high parietal lobe.  No areas of abnormal parenchymal attenuation are identified.  Stable hemorrhage in the occipital horn of the right lateral ventricle.  The lateral ventricles are otherwise symmetric and normal in size.  No midline shift or mass effect. The basal cisterns are intact. The craniocervical junction is unremarkable. The orbital structures appear symmetric and unremarkable. Mucoperiosteal thickening in the paranasal sinuses.  The mastoid air cells are clear. There are no aggressive bony lesions.      Stable exam.  No new hemorrhage. Noncontrast enhanced CT is a screening survey. Conditions such as vascular malformations, aneurysms, low grade, tumors, meningitis, subtle subarachnoid hemorrhages, etc., may not be demonstrated. Followup studies as clinically indicated may be necessary.  Dictated by: GLYNN BRASHER Dictated on: 4/19/2022 5:37 AM KERMIT GRANDA MD, have reviewed the images and report and concur with these findings interpreted by GLYNN BRASHER. Electronically Signed by: KERMIT MCKENZIE MD Signed on: 4/19/2022 6:39 AM     CT HEAD WO CONTRAST    Result Date: 4/18/2022  EXAM: CT HEAD WO CONTRAST CLINICAL INDICATION: Parenchymal hemorrhage follow-up. COMPARISON: CT head without contrast April 17, 2022 1524 hours and  2022 hours. TECHNIQUE: Multiple axial images of the head were obtained from the base of the skull to the vertex. Automated exposure control employed as radiation dose reduction strategy on this patient. FINDINGS: The gray-white matter differentiation is maintained. Grossly stable left inferior frontal parenchymal contusion measuring 1.2 cm (2/21), compared to 1.4 cm on prior. Decreased left temporal lobe parenchymal contusion. Redemonstrated hyperdensity layering within the posterior occipital horns of the lateral ventricles. New small hyperdense focus within the left high parietal cortical sulci (601B/69 and 2/45). Chronic left-sided subdural collection is not well seen. No extraparenchymal hemorrhage evident. No areas of abnormal parenchymal attenuation are identified. Intravascular calcifications. The ventricles and sulci are within normal limits for patient age. There is no midline shift or mass effect. The basal cisterns are intact. The craniocervical junction is unremarkable. Bilateral lens implants. The orbital structures appear symmetric and unremarkable. Mild mucosal thickening of bilateral maxillary sinuses and ethmoid air cells. The mastoid air cells are clear.  No acute osseous abnormality.     1.   New tiny left high parietal subarachnoid hemorrhage.  Redemonstrated layering small amount of intraventricular blood layering in the posterior horns of the lateral ventricles bilaterally. 2.   Stable left inferior frontal hemorrhagic parenchymal contusion. 3.   Decreased left temporal parenchymal contusion. Noncontrast enhanced CT is a screening survey. Conditions such as vascular malformations, aneurysms, low grade, tumors, meningitis, subtle subarachnoid hemorrhages, etc., may not be demonstrated. Followup studies as clinically indicated may be necessary. Dictated by: Terence Gonzalez D.O. Dictated on: 4/18/2022 4:41 PM COLE GRANDA M.D., have reviewed the images and report and concur with these findings  interpreted by Terence Gonzalez D.O.. Electronically Signed by: COLE LYN M.D. Signed on: 2022 5:14 PM     TRANSTHORACIC ECHO (TTE) COMPLETE W/ W/O IMAGING AGENT    *[facility]* [address] [place] [id number] Phone [phone] Fax [phone] Transthoracic Echocardiogram (TTE) Patient: Wilfredo Rodarte    Study Date/Time:    2022 10:52AM MRN:     [id number]              FIN#:               [id number] :     1950            Ht/Wt:              180.3cm 82.7kg Age:     72                    BSA/BMI:            2.03m^2 25.4kg/m^2 Gender:  M                     Baseline BP:        123 / 67 Ordering Physician:   Mabel Pereira  Referring Physician:  Mabel Pereira  Attending Physician:  Louie Hernandez Performing Physician: Jorge De La Garza MD Diagnostic Physician: Jorge De La Garza MD Sonographer:          LETITIA Can  -------------------------------------------------------------------------- INDICATIONS:   Syncope. -------------------------------------------------------------------------- STUDY CONCLUSIONS SUMMARY: 1. Left ventricle: The cavity size is normal. Wall thickness is mildly    increased. Hypertrophy is noted. The ejection fraction was measured by    3D assessment. Left ventricular diastolic function parameters are    normal. The global longitudinal strain value is -21.2%. The ejection    fraction is 68%. 2. Mitral valve: Trivial regurgitation. 3. Pulmonary arteries: Systolic pressure is indeterminate due to the    absence of tricuspid regurgitation. 4. Pericardium, extracardiac: There is no pericardial effusion. -------------------------------------------------------------------------- STUDY DATA:  There is no prior study available for comparison at this time.  Procedure:  Transthoracic echocardiography was performed. Image quality was good.  M-mode, complete 2D, 3D, complete spectral Doppler, color Doppler, and strain imaging.  Study  status:  Routine.  Study completion:  There were no complications. FINDINGS LEFT VENTRICLE:  The cavity size is normal. Wall thickness is mildly increased. Hypertrophy is noted. Systolic function is normal. Wall motion is normal; there are no regional wall motion abnormalities. The global longitudinal strain value is -21.2%.    The ejection fraction was measured by 3D assessment. The ejection fraction is 68%. Left ventricular diastolic function parameters are normal. AORTIC VALVE:  The annulus is mildly calcified. The valve is trileaflet. The leaflets are normal thickness. Cusp separation is normal.  Doppler: Transvalvular velocity is within the normal range. There is no stenosis. No regurgitation.    The ratio of LVOT to aortic valve peak velocity is 0.5. The valve area by the peak velocity method is 1.7cm^2. The valve area index by the peak velocity method is 0.86cm^2/m^2.    The peak systolic gradient is 14mm Hg. AORTA:  Aortic root: The aortic root is normal in size. Ascending aorta: The ascending aorta is normal in size. MITRAL VALVE:   Structurally normal valve. The annulus is normal. The leaflets are normal thickness. Leaflet separation is normal.  Doppler: Transvalvular velocity is within the normal range. There is no evidence for stenosis.  Trivial regurgitation.    The peak diastolic gradient is 3mm Hg. LEFT ATRIUM:  The atrium is normal in size. RIGHT VENTRICLE:  The cavity size is normal. Wall thickness is normal. Systolic function is normal. Systolic pressure is indeterminant due to the absence of tricuspid regurgitation. PULMONIC VALVE:   The leaflets are normal thickness.  Doppler:   Trivial regurgitation. TRICUSPID VALVE:   Structurally normal valve.   Leaflet separation is normal.  Doppler:   No regurgitation. PULMONARY ARTERY:   Systolic pressure is indeterminate due to the absence of tricuspid regurgitation. RIGHT ATRIUM:  The atrium is normal in size. PERICARDIUM:  There is no pericardial  effusion. SYSTEMIC VEINS: Inferior vena cava: The vessel is normal in size. The respirophasic diameter changes are in the normal range (greater than or equal to 50%). -------------------------------------------------------------------------- Measurements  Left ventricle              Value             Ref        Left atrium continued          Value          Ref  HANK, LAX chord     (L)      4.1   cm          4.2 - 5.8  Area ES, A4C                   20    cm^2     <=20  ESD, LAX chord     (L)      2.2   cm          2.5 - 4.0  Area ES, A2C                   18    cm^2     ---------  HANK/bsa, LAX chord (L)      2.0   cm/m^2      2.2 - 3.0  Vol, ES, 1-p A4C               54    ml       18 - 58  ESD/bsa, LAX chord (L)      1.1   cm/m^2      1.3 - 2.1  Vol/bsa, ES, 1-p A4C           27    ml/m^2   12 - 37  PW, ED, LAX        (H)      1.2   cm          0.6 - 1.0  Vol, ES, 1-p A2C               45    ml       18 - 58  PW, ED             (H)      1.2   cm          0.6 - 1.0  Vol/bsa, ES, 1-p A2C           22    ml/m^2   11 - 43  IVS/PW, ED                  0.95              ---------  Vol, ES, 2-p                   50    ml       ---------  EDV                         67    ml          62 - 150   Vol/bsa, ES, 2-p               25    ml/m^2   16 - 34  ESV                (L)      11    ml          21 - 61  EF                          68    %           52 - 72    Aortic valve                   Value          Ref  SV                          56    ml          ---------  Peak v, S                      1.9   m/sec    ---------  EDV/bsa            (L)      33    ml/m^2      34 - 74    Peak grad, S                   14    mm Hg    ---------  ESV/bsa            (L)      6     ml/m^2      11 - 31    LVOT/AV, Vpeak ratio           0.5            ---------  SV/bsa                      27    ml/m^2      ---------  PORSHA, Vmax                      1.7   cm^2     ---------  E', lat uche, TDI   (L)      7.29  cm/sec      >=10       PORSHA/bsa,  Vmax                  0.86  cm^2/m^2 ---------  E/e', lat uche, TDI          12                ---------  E', med uche, TDI            9.9   cm/sec      >=7        Mitral valve                   Value          Ref  E/e', med uche, TDI          9                 ---------  Peak E                         0.86  m/sec    ---------  E', avg, TDI                8.595 cm/sec      ---------  Peak A                         0.75  m/sec    ---------  E/e', avg, TDI              10                <=14       Decel time                     218   ms       ---------                                                           Peak grad, D                   3     mm Hg    ---------  LVOT                        Value             Ref        Peak E/A ratio                 1.1            ---------  Diam, S                     2.1   cm          ---------  Area                        3.5   cm^2        ---------  Pulmonic valve                 Value          Ref  Peak donya, S                 0.95  m/sec       ---------  NE v, ED                       0.81  m/sec    ---------  Peak grad, S                4     mm Hg       ---------  Qs                          4.8   L/min       ---------  Aortic root                    Value          Ref  Qs/bsa                      2.4   L/(min-m^2) ---------  Root diam, ED                  2.6   cm       <4.2                                                           S-T junct diam, ED             2.3   cm       2.3 - 3.5  Ventricular septum          Value             Ref        S-T junct diam/bsa, ED         1.1   cm/m^2   1.1 - 1.9  IVS, ED            (H)      1.1   cm          0.6 - 1.0                                                           Ascending aorta                Value          Ref  Right ventricle             Value             Ref        AAo AP diam, ED                3.0   cm       2.2 - 3.8  TAPSE, MM                   1.8   cm          1.7 - 3.1  AAo AP diam/bsa, ED            1.5   cm/m^2   1.1  - 1.9   RVOT                        Value             Ref        Pulmonary artery               Value          Ref  Peak v, S                   0.92  m/sec       ---------  Pressure ED                    6     mm Hg    ---------   Left atrium                 Value             Ref        Systemic veins                 Value          Ref  AP dim, ES         (H)      4.1   cm          3.0 - 4.0  Estimated CVP                  3     mm Hg    ---------  AP dim index                2.0   cm/m^2      1.5 - 2.3 Legend: (L)  and  (H)  vickie values outside specified reference range. Prepared and electronically signed by Jorge De La Garza MD 04/18/2022 12:15         Assessment and Plan:  71yo M with hx CAD s/p CABG, asthma, CVA, who presents as a trauma transfer after syncopal fall at [employer] with head strike. Found to have hemorrhagic contusions.     Injuries:  -BHT  -L SDH  -Left hemorrhagic contusions     Consults:  Neurosurgery: Luisa     Plan:     Neuro:  -Pain and nausea control prn  -6h CT head stable  -HOB >30deg  -Continue Keppra x 7days  -CTh at 24h  new SAH that was since confirmed as stable at 36 hr CTh  -NSGY: can start DVT ppx 4/20, ASA in 1 week     CVS:  -HDS  -SBP goal <140  -PRN hydralazine, labetalol  - Echo shows EF 68%; orthostatic VS WNL   -Trop negative, no ischemic changes on EKG  -Continue home antihypertensives  -Holding ASA81 x 1 week as per NSGY     RESP:  -Satting well on RA (97%)  -Cont home inhalers  -Encourage IS     GI:  -Regular diet   -IVF discontinued      Renal:  -Strict I/Os  -Normal Cr  -Continue home tamsulosin     Endo:  -ISS and well managed  -BG 97    Heme:  -Hgb normal, 15.0     ID:  -Afebrile  -WBC 8.3 on 4/21       MSK:  -PT/OT  -AIR     PPX:  -SCDs, lovenox restarted 4/20 per NSGY recs    Dispo: PM&R consult: appropriate for AIR; pending placement at [facility]    Discussed with Dr. Hernandez.     Davie Sawyer DPM  PGY-2; Trauma Service              Trauma Attending Note    S: no events  overnight. C/o headache this morning; denies dizziness, nausea, numbness, tingling, weakness, vision changes.     O:  Vitals reviewed  GEN: NAD, awake alert  Head: no abrasions, contusions  Neck: grossly normal   Pulm: No resp distress, non labored breathing  CV: RR  Abd: soft nt  Ext: TEMPLETON grossly, No gross deformities  Neuro: no focal deficits noted, awake, alert      Relevant chart, labs, and images reviewed      A/P: 73 yo male s/p fall with TBI       SDH, SAH, and cerebral hemorrhagic contusions - repeat head CTs stable. Keppra x 7 days. Cognitive assessment done.     Essential HTN, CAD s/p CABG, HLD, CVA, syncope - trop on admission normal, no ischemic changes on EKG, no events on tele, Echo done (no significant wall motion abnormalities or structural heart disease), orthostatics reported to me as normal - cont home meds. Can restart ASA in 1 week.      Asthma - cont home meds     BPH - flomax     Diet     DVT proph - Lvnx     PT/OT    Needs placement AIR, possible dc today pending bed availability      Plan discussed directly with patient, nurse, and team during bedside rounds    I have personally seen and examined the patient with the trauma team including midlevel providers, residents, medical students, and nurses.  The chart, labs, and images were reviewed by me.  Resident notes which are done for educational purposes are in the chart either separately or attached to my own. Plan of care d/w trauma team and patient. I have spent a total of 28 mins in the care of this patient today. Dr. ESME Hernandez

## 2025-03-31 NOTE — PROGRESS NOTES
"Lyndsey Tavares     VITALS: Blood pressure 116/70, pulse 71, temperature 97.3 °F (36.3 °C), temperature source Temporal, resp. rate 16, height 149.9 cm (59\"), weight 46.4 kg (102 lb 3.2 oz), SpO2 96%, not currently breastfeeding.    Subjective  Chief Complaint  Hypertension, Heartburn, and Precordial Pain    Subjective          History of Present Illness:    History of Present Illness  The patient is a 74-year-old female with significant medical conditions, including hypertension and a history of ovarian cancer, presenting to the clinic for a medical follow-up.    She reports no current chest pain or pressure. She has been experiencing reflux, which she attributes to an overconsumption of tomatoes. She has since discontinued the use of Unisom and has started taking melatonin 5 mg. She inquires if she can take melatonin daily. Her sleep pattern is generally satisfactory, although she experiences awakenings at night, particularly when she consumes water before bedtime. She recalls a recent incident where she was unable to fully empty her bladder, resulting in multiple awakenings. However, she notes that such incidents are infrequent, and her sleep quality is typically good.    She has been consuming a high-sodium soup daily for lunch, which she believes may have contributed to her elevated blood pressure. She is currently on a 10 mg dose of medication but occasionally reduces it to 5 mg when her blood pressure readings are low. She expresses a desire to discontinue this medication. She recalls that her blood pressure issues began during her chemotherapy treatment.    She is also on a cholesterol-lowering medication and is scheduled for a CT scan on 05/09/2025.    She has a history of elevated kidney function tests while on Avastin, which led to its discontinuation. She is scheduled to see Dr. Weston in 07/2025.    She is taking calcium and vitamin D supplements and reports no associated diarrhea. She does, " "however, experience constipation, which she attributes to a partial intestinal resection performed during her surgery.    MEDICATIONS  Current: Prilosec, melatonin, calcium, vitamin D.  Discontinued: Unisom, Avastin.    No complaints regarding medications.     The following portions of the patient's history were reviewed and updated as appropriate: allergies, current medications, past family history, past medical history, past social history, past surgical history and problem list.    Past Medical History  Past Medical History:   Diagnosis Date    Hypertension     Kidney disease     Ovarian cancer 2018    chemotherapy     Renal insufficiency 02/2024    Wears glasses        Surgical History  Past Surgical History:   Procedure Laterality Date    APPENDECTOMY      possibly with tubal (not certain)    BREAST BIOPSY Bilateral 1972    cysts removed     COLONOSCOPY      EXPLORATORY LAPAROTOMY, TOTAL ABDOMINAL HYSTERECTOMY SALPINGO OOPHORECTOMY N/A 09/11/2018    Procedure: LAPAROTOMY EXPLORATORY, TOTAL ABDOMINAL HYSTERECTOMY, BILATERAL SALPINGO OOPHORECTOMY, DEBULKING;  Surgeon: Shannan Bess MD;  Location: [facility];  Service: Gynecology    HERNIA REPAIR Right     inguinal     SUBLINGUAL SALIVARY CYST EXCISION Right 2020    at [facility]    TUBAL ABDOMINAL LIGATION         Family History  Family History   Adopted: Yes   Family history unknown: Yes       Social History  Social History     Socioeconomic History    Marital status:    Tobacco Use    Smoking status: Never     Passive exposure: Never    Smokeless tobacco: Never   Vaping Use    Vaping status: Never Used   Substance and Sexual Activity    Alcohol use: No    Drug use: No    Sexual activity: Not Currently     Partners: Male       Objective   Vital Signs:   /70 (BP Location: Right arm, Patient Position: Sitting, Cuff Size: Adult)   Pulse 71   Temp 97.3 °F (36.3 °C) (Temporal)   Resp 16   Ht 149.9 cm (59\")   Wt 46.4 kg (102 lb 3.2 oz)   SpO2 96%   " BMI 20.64 kg/m²       Physical Exam     Physical Exam  Lungs were auscultated.    Vital Signs  Blood pressure is 30 points lower today.    Gen: Patient in NAD. Pleasant and answers appropriately. A&Ox3.    Skin: Warm and dry with normal turgor. No purpura, rashes, or unusual pigmentation noted. Hair is normal in appearance and distribution.    HEENT: NC/AT. No lesions noted. Conjunctiva clear, sclera nonicteric. PERRL. EOMI without nystagmus or strabismus. Fundi appear benign. No hemorrhages or exudates of eyes. Auditory canals are patent bilaterally without lesions. TMs intact,  nonerythematous, nonbulging without lesions. Nasal mucosa pink, nonerythematous, and nonedematous. Frontal and maxillary sinuses are nontender. O/P nonerythematous and moist without exudate.    Neck: Supple without lymph nodes palpated. FROM. No carotid bruits appreciated bilaterally.    Lungs: CTA B/L without rales, rhonchi, crackles, or wheezes.    Heart: RRR. S1 and S2 normal. No S3 or S4. No MRGT.    Abd: Soft, nontender,nondistended. (+)BSx4 quadrants.     Extrem: No CCE. Radial pulses 2+/4 and equal B/L. FROMx4. No bone, joint, or muscle tenderness noted.    Neuro: No focal motor/sensory deficits.    Procedures    Result Review :   The following data was reviewed by: Anais Melo MD on 03/12/2025:       Results  Laboratory Studies  Kidney function test was 1.03.           Assessment and Plan      Lyndsey Tavares is a 74 y.o. here for medical followup.    Assessment & Plan  1. Gastroesophageal reflux disease (GERD).  She reports a history of reflux, likely exacerbated by excessive consumption of tomatoes and spicy foods. She is advised to take Prilosec if symptoms recur. If Prilosec alleviates the symptoms, it will confirm heartburn as the cause. If symptoms persist, she should contact the clinic.    2. Hypertension.  Her blood pressure has decreased by 30 points today, but it remains within acceptable limits. The systolic  blood pressure should be maintained at 130 or below, and the diastolic blood pressure should not exceed 100. She has previously recorded systolic readings in the 140s. She is advised to discontinue her antihypertensive medication. If her systolic blood pressure increases to the 140s, she should resume taking half a tablet of her medication. If necessary, she can reduce the dosage to 5 mg.    3. Hypercholesterolemia.  She is currently on cholesterol medication and will continue the regimen. A re-evaluation of her cholesterol levels will be conducted in 6 months.    4. Elevated kidney function.  Her kidney function is slightly elevated at 1.03, likely due to high sodium intake from soup. She is advised to monitor her kidney function regularly. A re-evaluation of her kidney function will be conducted in April 2025.    5. Insomnia.  She is advised to take melatonin 3 to 5 hours before bedtime. If necessary, she can increase the dosage to 10 mg and should inform the clinic if she does so.    6. Constipation.  She is advised to take her calcium supplement with food to minimize side effects. She can consider switching to Os-Rishabh, which contains only calcium and vitamin D, for potentially improved tolerance.    PROCEDURE  The patient underwent a partial intestinal resection during her surgery.      BMI is within normal parameters. No other follow-up for BMI required.         Patient or patient representative verbalized consent for the use of Ambient Listening during the visit with  Anais Melo MD for chart documentation. 3/30/2025  23:34 EDT      I spent 32 minutes caring for Lyndsey on this date of service. This time includes time spent by me in the following activities:obtaining and/or reviewing a separately obtained history, performing a medically appropriate examination and/or evaluation , and counseling and educating the patient/family/caregiver  Follow Up   Return (already has appt).  Findings and plans  discussed with patient who verbalizes understanding and agreement. Will followup with patient once results are in. Patient was given instructions and counseling regarding her condition or for health maintenance advice. Please see specific information pulled into the AVS if appropriate.       Anais Melo MD

## 2025-04-02 RX ORDER — LISINOPRIL 10 MG/1
10 TABLET ORAL DAILY
Qty: 90 TABLET | Refills: 0 | Status: SHIPPED | OUTPATIENT
Start: 2025-04-02

## 2025-04-10 RX ORDER — LISINOPRIL 10 MG/1
10 TABLET ORAL DAILY
Qty: 90 TABLET | Refills: 0 | OUTPATIENT
Start: 2025-04-10

## 2025-04-21 ENCOUNTER — OFFICE VISIT (OUTPATIENT)
Dept: FAMILY MEDICINE CLINIC | Facility: CLINIC | Age: 75
End: 2025-04-21
Payer: MEDICARE

## 2025-04-21 ENCOUNTER — LAB (OUTPATIENT)
Dept: FAMILY MEDICINE CLINIC | Facility: CLINIC | Age: 75
End: 2025-04-21
Payer: MEDICARE

## 2025-04-21 ENCOUNTER — TELEPHONE (OUTPATIENT)
Dept: FAMILY MEDICINE CLINIC | Facility: CLINIC | Age: 75
End: 2025-04-21

## 2025-04-21 VITALS
WEIGHT: 103.2 LBS | DIASTOLIC BLOOD PRESSURE: 78 MMHG | HEIGHT: 59 IN | SYSTOLIC BLOOD PRESSURE: 130 MMHG | TEMPERATURE: 96.6 F | BODY MASS INDEX: 20.8 KG/M2 | HEART RATE: 92 BPM | OXYGEN SATURATION: 98 %

## 2025-04-21 DIAGNOSIS — I10 ESSENTIAL HYPERTENSION: ICD-10-CM

## 2025-04-21 DIAGNOSIS — N18.32 STAGE 3B CHRONIC KIDNEY DISEASE: ICD-10-CM

## 2025-04-21 DIAGNOSIS — Z85.43 HISTORY OF OVARIAN CANCER: ICD-10-CM

## 2025-04-21 DIAGNOSIS — R80.9 PROTEINURIA, UNSPECIFIED TYPE: ICD-10-CM

## 2025-04-21 DIAGNOSIS — N18.32 STAGE 3B CHRONIC KIDNEY DISEASE: Primary | ICD-10-CM

## 2025-04-21 DIAGNOSIS — L98.9 SKIN LESION: ICD-10-CM

## 2025-04-21 PROCEDURE — 80053 COMPREHEN METABOLIC PANEL: CPT | Performed by: FAMILY MEDICINE

## 2025-04-21 PROCEDURE — 36415 COLL VENOUS BLD VENIPUNCTURE: CPT

## 2025-04-21 NOTE — TELEPHONE ENCOUNTER
----- Message from Anais Cornelionadia sent at 4/21/2025 11:17 AM EDT -----  Can you call Dr. Nash? She has tried and has not had any answers. Patient has been hypotensive lately on lisinpril 10 mg daily. Her BPs have been 90s-110s/60s-80s. She has been occasionally dizzy. I decreased her lisinopril to 5 mg daily instead of the 10 mg and she is supposed to monitor. Is Dr. Nash okay with that or would he like to do something different. Thanks.

## 2025-04-21 NOTE — TELEPHONE ENCOUNTER
Called Dr. Nash's office & left a detailed message for provider ,they stated they would let us know what he says.

## 2025-04-22 LAB
ALBUMIN SERPL-MCNC: 4.2 G/DL (ref 3.5–5.2)
ALBUMIN/GLOB SERPL: 1.5 G/DL
ALP SERPL-CCNC: 25 U/L (ref 39–117)
ALT SERPL W P-5'-P-CCNC: 16 U/L (ref 1–33)
ANION GAP SERPL CALCULATED.3IONS-SCNC: 8 MMOL/L (ref 5–15)
AST SERPL-CCNC: 20 U/L (ref 1–32)
BILIRUB SERPL-MCNC: 0.3 MG/DL (ref 0–1.2)
BUN SERPL-MCNC: 13 MG/DL (ref 8–23)
BUN/CREAT SERPL: 13.1 (ref 7–25)
CALCIUM SPEC-SCNC: 9.6 MG/DL (ref 8.6–10.5)
CHLORIDE SERPL-SCNC: 100 MMOL/L (ref 98–107)
CO2 SERPL-SCNC: 29 MMOL/L (ref 22–29)
CREAT SERPL-MCNC: 0.99 MG/DL (ref 0.57–1)
EGFRCR SERPLBLD CKD-EPI 2021: 60 ML/MIN/1.73
GLOBULIN UR ELPH-MCNC: 2.8 GM/DL
GLUCOSE SERPL-MCNC: 149 MG/DL (ref 65–99)
POTASSIUM SERPL-SCNC: 4 MMOL/L (ref 3.5–5.2)
PROT SERPL-MCNC: 7 G/DL (ref 6–8.5)
SODIUM SERPL-SCNC: 137 MMOL/L (ref 136–145)

## 2025-04-22 NOTE — TELEPHONE ENCOUNTER
MA returned call Dr. Nash recommends to stop Lisinopril all together,monitor BP,restart if SBP> 130.

## 2025-04-22 NOTE — TELEPHONE ENCOUNTER
Please call Eugenia and make sure she's received this message. Have her monitor and call me if things change again.

## 2025-05-05 PROBLEM — N28.9 RENAL INSUFFICIENCY: Status: RESOLVED | Noted: 2018-03-21 | Resolved: 2025-05-05

## 2025-05-05 PROBLEM — T50.905A HYPERTENSION DUE TO DRUG: Status: RESOLVED | Noted: 2023-04-04 | Resolved: 2025-05-05

## 2025-05-05 PROBLEM — J06.0 ACUTE LARYNGOPHARYNGITIS: Status: RESOLVED | Noted: 2019-11-13 | Resolved: 2025-05-05

## 2025-05-05 PROBLEM — I15.8 HYPERTENSION DUE TO DRUG: Status: RESOLVED | Noted: 2023-04-04 | Resolved: 2025-05-05

## 2025-05-05 PROBLEM — R45.89 ANXIETY ABOUT HEALTH: Status: RESOLVED | Noted: 2018-08-06 | Resolved: 2025-05-05

## 2025-05-06 NOTE — PROGRESS NOTES
"Lyndsey Tavares     VITALS: Blood pressure 130/78, pulse 92, temperature 96.6 °F (35.9 °C), temperature source Temporal, height 149.9 cm (59\"), weight 46.8 kg (103 lb 3.2 oz), SpO2 98%, not currently breastfeeding.    Subjective  Chief Complaint  Hypertension    Subjective          History of Present Illness:    History of Present Illness  The patient is a 74-year-old female with medical conditions significant for hypertension and a history of ovarian cancer with mets to the omentum who presents to the clinic for follow-up.    Blood pressure has been monitored at home and has generally remained within normal limits. An elevated reading was noted this morning, which normalized upon retesting after a brief rest period. Occasional dizziness is experienced when blood pressure is low. Currently, she is on a regimen of lisinopril 10 mg. She has been under the care of Dr. Nash, a nephrologist, for quarterly visits. Despite some improvement, blood pressure remains above the desired range, and she was advised to return in 6 months. There is a desire to discontinue medication, and advice has been sought from Dr. Nash regarding this matter. Previously, a diuretic was prescribed in conjunction with lisinopril but was discontinued due to episodes of dizziness.    A small lesion on the leg is reported, which appears to be improving. An appointment with a dermatologist is planned for further evaluation.    A CT scan is scheduled for 05/01/2025, followed by an appointment with her oncologist the subsequent week.    Cholesterol medication is being tolerated well, with no adverse effects reported.    The shingles vaccination series has been completed.    No complaints regarding medications.     The following portions of the patient's history were reviewed and updated as appropriate: allergies, current medications, past family history, past medical history, past social history, past surgical history and problem list.    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 DX Regulatory Update      Ovarian cancer 2018    chemotherapy     Renal insufficiency 02/2024    Vitamin B12 deficiency 02/05/2018    Vitamin D deficiency 02/05/2018    Wears glasses        Surgical History  Past Surgical History:   Procedure Laterality Date    APPENDECTOMY      possibly with tubal (not certain)    BREAST BIOPSY Bilateral 1972    cysts removed     COLONOSCOPY      EXPLORATORY LAPAROTOMY, TOTAL ABDOMINAL HYSTERECTOMY SALPINGO OOPHORECTOMY N/A 09/11/2018    Procedure: LAPAROTOMY EXPLORATORY, TOTAL ABDOMINAL HYSTERECTOMY, BILATERAL SALPINGO OOPHORECTOMY, DEBULKING;  Surgeon: Shannan Bess MD;  [facility];  Service: Gynecology    HERNIA REPAIR Right     inguinal     SUBLINGUAL SALIVARY CYST EXCISION Right 2020    at [facility]    TUBAL ABDOMINAL LIGATION         Family History  Family History   Adopted: Yes   Family history unknown: Yes       Social History  Social History     Socioeconomic History    Marital status:    Tobacco Use    Smoking status: Never     Passive exposure: Never    Smokeless tobacco: Never   Vaping Use    Vaping status: Never Used   Substance and Sexual Activity    Alcohol use: No    Drug use: No    Sexual activity: Not Currently     Partners: Male       Objective   Vital Signs:   /78 (BP Location: Right arm, Patient Position: Sitting, Cuff Size: Adult)   Pulse 92   Temp 96.6 °F (35.9 °C) (Temporal)   Ht 149.9 cm (59\")   Wt 46.8 kg (103 lb 3.2 oz)   SpO2 98%   BMI 20.84 kg/m²       Physical Exam     Physical Exam  Respiratory: Clear to auscultation, no wheezing, rales or rhonchi  Skin: Lesion on leg near varicose vein, recommended follow-up with dermatology    Gen: Patient in NAD. Pleasant and answers appropriately. A&Ox3.    Skin: Warm and " dry with normal turgor. No purpura, rashes, or unusual pigmentation noted. Hair is normal in appearance and distribution.    HEENT: NC/AT. No lesions noted. Conjunctiva clear, sclera nonicteric. PERRL. EOMI without nystagmus or strabismus. Fundi appear benign. No hemorrhages or exudates of eyes. Auditory canals are patent bilaterally without lesions. TMs intact,  nonerythematous, bulging without lesions. Nasal mucosa pink, nonerythematous, and nonedematous. Frontal and maxillary sinuses are nontender. O/P nonerythematous and moist without exudate.    Neck: Supple without lymph nodes palpated. FROM. No carotid bruits appreciated bilaterally.    Lungs: CTA B/L without rales, rhonchi, crackles, or wheezes.    Heart: RRR. S1 and S2 normal. No S3 or S4. No MRGT.    Abd: Soft, nontender,nondistended. (+)BSx4 quadrants.     Extrem: No CCE. Radial pulses 2+/4 and equal B/L. FROMx4. Positive joint tenderness noted.    Neuro: No focal motor/sensory deficits.    Procedures    Result Review :   The following data was reviewed by: Anais Melo MD on 04/21/2025:       Results  Labs   - Kidney function test: Showed improvement   - Blood glucose test: Consistently below 136    Lipid Panel          1/21/2025    09:26   Lipid Panel   Total Cholesterol 300    Triglycerides 106    HDL Cholesterol 88    VLDL Cholesterol 18    LDL Cholesterol  194    LDL/HDL Ratio 2.17      The 10-year ASCVD risk score (Soheila DK, et al., 2019) is: 20.4%    Values used to calculate the score:      Age: 74 years      Sex: Female      Is Non- : No      Diabetic: No      Tobacco smoker: No      Systolic Blood Pressure: 130 mmHg      Is BP treated: Yes      HDL Cholesterol: 88 mg/dL      Total Cholesterol: 300 mg/dL           Assessment and Plan      Lyndsey Tavares is a 74 y.o. here for medical followup.    Diagnoses and all orders for this visit:    1. Stage 3b chronic kidney disease (Primary)  -     Comprehensive  Metabolic Panel; Future    2. Essential hypertension  -     Comprehensive Metabolic Panel; Future    3. Proteinuria, unspecified type  -     Comprehensive Metabolic Panel; Future    4. Skin lesion    5. History of ovarian cancer        Assessment & Plan  1. Hypertension.  - Blood pressure readings have been consistently within the normal range, with occasional episodes of hypotension causing dizziness.  - The current dosage of lisinopril 10 mg may be excessive; potential benefits and risks, including its protective effect on the kidneys and possible renal damage, were discussed.  - Advised to reduce lisinopril dosage to 5 mg and monitor blood pressure; consultation with Dr. Ochoa will be arranged to discuss her case.  - Kidney function test will be conducted today; if blood pressure remains stable on the reduced dosage, it will be continued; otherwise, alternative antihypertensive medications will be considered.    2. Skin lesion.  - Lesion located near a varicose vein, complicating potential treatment options such as cryotherapy due to risk of blistering and subsequent bleeding.  - Reassured that the lesion does not pose a risk of malignancy.  - Advised to schedule an appointment with a dermatologist for further evaluation and potential removal of the lesion.  - Follow-up with dermatology recommended.    3. History of ovarian cancer.  - Scheduled for a CAT scan on 05/01/2025 and will follow up with her oncologist the following week.  - Mammogram is up to date.  - Continuous monitoring for recurrence and overall health status.    4. Cholesterol management.  - Cholesterol levels will be reassessed in either July 2025 or August 2025 to determine the effectiveness of current medication.  - Current cholesterol medication is well tolerated without noticeable side effects.  - Regular follow-up to ensure optimal lipid control.    5. Immunization.  - Given immunocompromised status due to cancer history, a measles titer  check was recommended to ensure adequate immunity.  - Patient will consider this option and decide at a later date.  - Discussion included the importance of immunization due to potential exposure risks.    Follow-up  - Follow-up in 1 month to reassess blood pressure and kidney function.  - Continuous monitoring and adjustment of treatment plan as necessary.      BMI is within normal parameters. No other follow-up for BMI required.          Patient or patient representative verbalized consent for the use of Ambient Listening during the visit with  Anais Melo MD for chart documentation. 5/5/2025  23:22 EDT        Follow Up   Return in about 4 weeks (around 5/19/2025), or LABS.  Findings and plans discussed with patient who verbalizes understanding and agreement. Will followup with patient once results are in. Patient was given instructions and counseling regarding her condition or for health maintenance advice. Please see specific information pulled into the AVS if appropriate.       Anais Melo MD

## 2025-05-08 DIAGNOSIS — K21.9 GASTROESOPHAGEAL REFLUX DISEASE WITHOUT ESOPHAGITIS: ICD-10-CM

## 2025-05-08 RX ORDER — OMEPRAZOLE 40 MG/1
40 CAPSULE, DELAYED RELEASE ORAL DAILY
Qty: 90 CAPSULE | Refills: 0 | OUTPATIENT
Start: 2025-05-08

## 2025-05-09 ENCOUNTER — HOSPITAL ENCOUNTER (OUTPATIENT)
Dept: CT IMAGING | Facility: HOSPITAL | Age: 75
Discharge: HOME | End: 2025-05-09
Payer: MEDICARE

## 2025-05-09 DIAGNOSIS — C77.1 METASTASIS TO MEDIASTINAL LYMPH NODE: ICD-10-CM

## 2025-05-09 DIAGNOSIS — C56.3 OVARIAN CANCER, BILATERAL: ICD-10-CM

## 2025-05-09 PROCEDURE — 25510000001 IOPAMIDOL 61 % SOLUTION: Performed by: OBSTETRICS & GYNECOLOGY

## 2025-05-09 PROCEDURE — 71260 CT THORAX DX C+: CPT

## 2025-05-09 RX ORDER — IOPAMIDOL 612 MG/ML
100 INJECTION, SOLUTION INTRAVASCULAR
Status: COMPLETED | OUTPATIENT
Start: 2025-05-09 | End: 2025-05-09

## 2025-05-09 RX ADMIN — IOPAMIDOL 85 ML: 612 INJECTION, SOLUTION INTRAVENOUS at 12:28

## 2025-05-14 ENCOUNTER — HOSPITAL ENCOUNTER (OUTPATIENT)
Dept: ONCOLOGY | Facility: HOSPITAL | Age: 75
Discharge: HOME | End: 2025-05-14
Admitting: OBSTETRICS & GYNECOLOGY
Payer: MEDICARE

## 2025-05-14 ENCOUNTER — OFFICE VISIT (OUTPATIENT)
Dept: GYNECOLOGIC ONCOLOGY | Facility: CLINIC | Age: 75
End: 2025-05-14
Payer: MEDICARE

## 2025-05-14 VITALS
HEIGHT: 59 IN | WEIGHT: 102.4 LBS | SYSTOLIC BLOOD PRESSURE: 151 MMHG | TEMPERATURE: 98.2 F | BODY MASS INDEX: 20.64 KG/M2 | OXYGEN SATURATION: 97 % | RESPIRATION RATE: 16 BRPM | DIASTOLIC BLOOD PRESSURE: 74 MMHG | HEART RATE: 82 BPM

## 2025-05-14 DIAGNOSIS — Z95.828 PORT-A-CATH IN PLACE: ICD-10-CM

## 2025-05-14 DIAGNOSIS — C77.1 METASTASIS TO MEDIASTINAL LYMPH NODE: ICD-10-CM

## 2025-05-14 DIAGNOSIS — C56.3 OVARIAN CANCER, BILATERAL: Primary | ICD-10-CM

## 2025-05-14 DIAGNOSIS — E78.00 PURE HYPERCHOLESTEROLEMIA: ICD-10-CM

## 2025-05-14 LAB — CANCER AG125 SERPL QL: 25.4 U/ML (ref 0–38.1)

## 2025-05-14 PROCEDURE — 86304 IMMUNOASSAY TUMOR CA 125: CPT | Performed by: OBSTETRICS & GYNECOLOGY

## 2025-05-14 PROCEDURE — 36591 DRAW BLOOD OFF VENOUS DEVICE: CPT

## 2025-05-14 PROCEDURE — 25010000002 HEPARIN LOCK FLUSH PER 10 UNITS: Performed by: NURSE PRACTITIONER

## 2025-05-14 RX ORDER — HEPARIN SODIUM (PORCINE) LOCK FLUSH IV SOLN 100 UNIT/ML 100 UNIT/ML
500 SOLUTION INTRAVENOUS AS NEEDED
Status: DISCONTINUED | OUTPATIENT
Start: 2025-05-14 | End: 2025-05-15 | Stop reason: HOSPADM

## 2025-05-14 RX ORDER — LIDOCAINE AND PRILOCAINE 25; 25 MG/G; MG/G
CREAM TOPICAL AS NEEDED
Qty: 30 G | Refills: 3 | Status: SHIPPED | OUTPATIENT
Start: 2025-05-14

## 2025-05-14 RX ORDER — SODIUM CHLORIDE 0.9 % (FLUSH) 0.9 %
10 SYRINGE (ML) INJECTION AS NEEDED
OUTPATIENT
Start: 2025-07-01

## 2025-05-14 RX ORDER — ROSUVASTATIN CALCIUM 5 MG/1
5 TABLET, COATED ORAL DAILY
Qty: 90 TABLET | Refills: 3 | Status: SHIPPED | OUTPATIENT
Start: 2025-05-14

## 2025-05-14 RX ORDER — HEPARIN SODIUM (PORCINE) LOCK FLUSH IV SOLN 100 UNIT/ML 100 UNIT/ML
500 SOLUTION INTRAVENOUS AS NEEDED
OUTPATIENT
Start: 2025-07-01

## 2025-05-14 RX ADMIN — HEPARIN 500 UNITS: 100 SYRINGE at 10:28

## 2025-05-14 NOTE — PROGRESS NOTES
GYN ONCOLOGY CANCER SURVEILLANCE FOLLOW-UP    Lyndsey Tavares  [id number]  1950    Subjective   Chief Complaint: History of ovarian adenocarcinoma status post biopsy-proven lymph node recurrence in the chest now status post bevacizumab maintenance.    History of present illness:   Lyndsey Tavares is a 74 y.o. year old female who is here today for treatment and evaluation of the above issues.    -Last cycle of bevacizumab maintenance therapy was Jan 2024 due to persistent proteinuria.      -She underwent bilateral screening mammogram 7/30/2024 as well as ultrasound. BR3. This was performed at [facility] ([place]).  Repeat imaging planned for 6 months (u/s targeted, right).  An ultrasound-guided right breast biopsy was performed 9/18/24.  Pathology showed fibroepithelial proliferation consistent with fibroadenoma; no atypia or malignancy identified.  This report recommends follow-up annual screening mammogram in 11 months.  There is a document scanned in from [facility] noting that she will be due for annual screening mammogram on 8/5/2025.    -She is following with Dr Elza Nash of nephrology for CKD, stage 3b likely secondary to Bevacizumab. On 4-21-25 creatinine 0.99, GFR 60.0.  Eli reports that she is very conscientious about medication and food given renal status and is proactive about meeting daily water goals and avoiding nephrotoxic drugs.    -I spoke with pt and  regarding mgmt recommendations and her personal CV risk, diagnoses, and mgmt recommendations. She started crestor 5mg on 2/2025 which she continues. Reports tolerating well without any reported s/e or adverse rxn. Last lipid panel 1-. Needs RF.    -Today, she reports not taking her lisinopril. She follows BP regularly at home and it routinely runs ~120s/70s. On review of PCP note from 4-21-25 and 3-12-25, lisinopril dose decreased to 5 mg with goal BP <130/80.  She will take her lisinopril if systolic blood pressure exceeds  140s or diastolic exceeds 100.    -She is again accompanied by her .  They plan to have lunch at [employer] after visit and her  has an appointment later this afternoon here in [place].  She is feeling well and has no acute complaints at this time. Denies any major changes in health since last visit.  She denies vaginal bleeding and discharge. She does not have abdominal or pelvic pain. She is tolerating a regular diet and endorses a normal appetite. She denies nausea and vomiting. She denies early satiety and bloating. Denies any CP, SOB, lightheadedness or dizziness. She denies changes in her bowel/bladder. No dysuria, frequency, urgency, hematuria or flank pain. Reports normal energy levels.   - A CT scan of the chest with contrast was completed on 5/9/2025, final radiology reading is not yet available at time of visit.  - She has regular port flushes q3m with quarterly ca125.   level remains low stable, collected earlier today but results are still in process. Pt requested RF of emla cream.        Cancer History:   Oncology/Hematology History   Ovarian cancer, bilateral   6/22/2018 Initial Diagnosis    Abnormal examination by GI for patient c/o bloating and diarrhea, sent to GYN. TVUS concerning for malignancy. CT showed multiple cystic masses on bilateral ovaries, possible omental implants, and large volume ascites. Paracentesis performed and cytology returned positive for metastatic adenocarcinoma, favor GYN origin. CA-125 at diagnosis = 907.7. Referred to Gyn Oncology.      6/28/2018 Procedure    Port-a-cath placement     7/6/2018 - 8/16/2018 Chemotherapy    OP OVARIAN PACLitaxel / CARBOplatin (Q21D)  Neoadjuvant x 3 cycles     7/16/2018 -  Chemotherapy    OP CENTRAL VENOUS ACCESS DEVICE CARE AND MAINTENANCE     9/6/2018 Imaging    Interval CT showed improvement in ascites and resolution of previous left pleural effusion     9/11/2018 Surgery    Exploratory laparotomy, EMMA/BSO,  debulking to R=0, and partial rectal resection. 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 Primary tumor site thought to be left fallopian tube. Bilateral ovarian, tubal, and surface involvement. Omentum, pelvic peritoneum, colon serosa, and rectal serosa involved. Macroscopic extrapelvic peritoneal implants also found. Stage IIIB grade 3         10/3/2018 - 11/29/2018 Chemotherapy    OP OVARIAN PACLitaxel / CARBOplatin AUC=6 / Bevacizumab 15 mg/kg  3 cycles adjuvant chemotherapy planned.     Patient chose to decline Avastin with all adjuvant cycles.     10/19/2018 Genetic Testing    Counseling and testing completed via [employer] panel. 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 Patient does not have disease, but is a carrier.     1/3/2019 Imaging    Post-treatment CT chest, abdomen, and pelvis. No ascites, nodularity, or residual disease seen. No evidence of progression or active malignancy.      3/21/2019 Imaging    CT chest, abdomen, pelvis for diffuse abdominal pain. Study negative for recurrent disease     12/29/2020 Imaging    CT chest, abdomen, pelvis prior to port removal:  Stable examination with no CT evidence of acute intrathoracic, intra-abdominal or pelvic abnormality. No evidence of progression or metastatic disease.     9/10/2021 Imaging    CT chest, abdomen, pelvis:  Stable appearance of the chest abdomen and pelvis without acute pathology or evidence for occurrence or active metastatic disease     6/22/2022 Imaging    CT chest, abdomen, pelvis:  No evidence of metastatic disease within the abdomen or pelvis. Slowly enlarging bilateral pericardial fat pad lymph notes. Findings  are nonspecific given the slow progression since 2020, but are  concerning for metastatic disease or lymphoma.     8/22/2022 Progression    CT biopsy left pericardial lymph node. 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     9/20/2022 Molecular Testing    CARIS results:  PD-L1 positive CPS 20  ER positive 2+, 75%  MSI stable, MMR proficient, MEGAN low, TMB low  PAMELA, BRCA 1/2, RAD51 C/D all negative  MI negative  Her2/Kike negative (1+, 5%)  NEDRA 1 negative     12/13/2022 - 1/25/2023 Chemotherapy    OP OVARIAN PACLitaxel / CARBOplatin (Q21D)     2/22/2023 - 4/5/2023 Chemotherapy    OP OVARIAN PACLitaxel / CARBOplatin AUC=6 / Bevacizumab 15 mg/kg     4/26/2023 Imaging    CT C/A/P  Impression:  1.Decreased size of masses in the pericardial fat.  2.Decreased size of previously noted enlarged right external iliac lymph node.     4/26/2023 - 1/10/2024 Chemotherapy    OP OVARIAN Bevacizumab 15 mg/kg (Maintenance)  Development of proteinuria led to slightly early discontinuation       3/4/2024 Imaging    CT scan chest abdomen and pelvis  IMPRESSION:  1.No acute abnormality or metastatic disease identified within the chest, abdomen, or pelvis.  2.Subcentimeter right external iliac chain lymph node is not pathologically enlarged by size criteria, not significantly changed.   3.Please see above for additional details.           The current medication list and allergy list were reviewed and reconciled.     Past Medical History, Past Surgical History, Social History, Family History have been reviewed and are without significant changes except as mentioned.      Review of Systems   Constitutional: Negative.    Respiratory: Negative.     Cardiovascular: Negative.    Gastrointestinal: Negative.    Endocrine: Negative.    Genitourinary: Negative.    Skin: Negative.    Neurological: Negative.    Hematological: Negative.    Psychiatric/Behavioral: Negative.             Objective   Physical Exam  Vital Signs: /74   Pulse 82   Temp 98.2 °F (36.8 °C) (Temporal)   Resp 16   Ht 149.9 cm (59\")   Wt 46.4 kg (102 lb 6.4 oz)   SpO2 97%   BMI 20.68 kg/m²   Vitals:    05/14/25 1046   PainSc: 0-No pain           General " Appearance:  alert, cooperative, no apparent distress, appears stated age, and normal weight   Neurologic/Psych: A&O x 3, gait steady, appropriate affect   HEENT:  Normocephalic, without obvious abnormality, mucous membranes moist   Abdomen:   Soft, non-tender, non-distended, and no organomegaly   Lymph nodes: No cervical, supraclavicular, inguinal adenopathy noted   Pelvic: External genitalia are free from lesion. On bimanual examination, no mass was appreciated. Uterus cervix and adnexa are surgically absent. Parametria are smooth. Rectovaginal exam was deferred.      ECOG score: 0             Result Review :   -  CT CHEST W CONTRAST DIAGNOSTIC     Date of Exam: 5/9/2025 12:07 PM EDT     Indication: metastatic ovarian cancer.     Comparison: 10/30/2024     Technique: Axial CT images were obtained of the chest after the uneventful intravenous administration of 85 mL Isovue-300 .  Reconstructed coronal and sagittal images were also obtained. Automated exposure control and iterative construction methods were   used.        Findings:  The central airways are patent. The lungs are clear bilaterally. No suspicious pulmonary nodules are identified.     No axillary or mediastinal adenopathy is identified. The thyroid is normal. The esophagus is normal. Limited evaluation of the upper abdomen is unremarkable.     No aggressive appearing lytic or sclerotic bone lesions are identified.     IMPRESSION:  Impression:  No evidence of metastatic disease within the chest.           Electronically Signed: Tru Gary MD    5/14/2025 11:31 AM EDT    Workstation ID: [id number]      Latest Reference Range & Units 04/21/25 13:42   Sodium 136 - 145 mmol/L 137   Potassium 3.5 - 5.2 mmol/L 4.0   Chloride 98 - 107 mmol/L 100   CO2 22.0 - 29.0 mmol/L 29.0   Anion Gap 5.0 - 15.0 mmol/L 8.0   BUN 8 - 23 mg/dL 13   Creatinine 0.57 - 1.00 mg/dL 0.99   BUN/Creatinine Ratio 7.0 - 25.0  13.1   eGFR >60.0 mL/min/1.73 60.0 (L)   Glucose 65 - 99  mg/dL 149 (H)   Calcium 8.6 - 10.5 mg/dL 9.6   Alkaline Phosphatase 39 - 117 U/L 25 (L)   Total Protein 6.0 - 8.5 g/dL 7.0   Albumin 3.5 - 5.2 g/dL 4.2   Globulin gm/dL 2.8   A/G Ratio g/dL 1.5   AST (SGOT) 1 - 32 U/L 20   ALT (SGPT) 1 - 33 U/L 16   Total Bilirubin 0.0 - 1.2 mg/dL 0.3   (L): Data is abnormally low  (H): Data is abnormally high      Tumor marker:     Date Value Ref Range Status   02/18/2025 20.3 0.0 - 38.1 U/mL Final   10/10/2024 24.7 0.0 - 38.1 U/mL Final   07/10/2024 14.8 0.0 - 38.1 U/mL Final   05/29/2024 17.3 0.0 - 38.1 U/mL Final              Assessment and Plan:   This is a 74-year-old woman with recurrent ovarian cancer metastasized to thoracic lymph nodes.    Recurrent Ovarian Cancer, see detailed cancer history above.  -No evidence of disease   -most recently was on bevacizumab maintenance and this was discontinued due to proteinuria  -standing orders for  q 3months   -We discussed CT imaging chest every 6 months for the first couple of years. Could also consider abdominal screening once a year.  -I will call Eli as soon as final radiology report is available.  -She will follow-up in 3 months, we will plan to alternate visits with APRN and MD.  -Repeat CT C/A/P in 6m (pending stable imaging today), can order on return     PAC  -Continue flushes   -emla cream rx refilled     Chemotherapy-induced neuropathy  -mild at baseline since chemotherapy in 2018  -stable      Routine Health Maintenance Screening  - Bilateral screening mammogram due 8/2025  - Colonoscopy [facility] Dr Sal[facility] on 4-, tubular adenoma polyp without dysplasia or malignancy. I assume repeat 5yrs.  - DEXA 3-, osteopenia.    -HLD: I sent RF of crestor. CrCl= 36mL/min.  Recommend repeat FLP ~3-6 months after starting statin as well as repeat hepatic function after starting statin. She reports pcp plans to repeat all labs and f/u on chronic conditions in May or Aug.     Diagnoses and all orders for this  visit:    1. Ovarian cancer, bilateral (Primary)    2. 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 Pure hypercholesterolemia  -     rosuvastatin (Crestor) 5 MG tablet; Take 1 tablet by mouth Daily.  Dispense: 90 tablet; Refill: 3    4. Port-A-Cath in place  -     lidocaine-prilocaine (EMLA) 2.5-2.5 % cream; Apply  topically to the appropriate area as directed As Needed (as directed).  Dispense: 30 g; Refill: 3      Pain assessment was performed today as a part of patient’s care.  For patients with pain related to surgery, gynecologic malignancy or cancer treatment, the plan is as noted in the assessment/plan.  For patients with pain not related to these issues, they are to seek any further needed care from a more appropriate provider, such as PCP.    Follow-up:     3 months, alternate visits with TRENT and MD     Electronically signed by TRENT Chávez on 05/14/2025    ADDENDUM 5- @12:58pm: Called and spoke with patient.  Let her know that the final radiology report has now been posted to [employer].  Thankfully her CT showing no evidence of metastatic disease within the chest. Repeat tumor marker still in process.  Advised that moving forward I will defer ongoing management of hyperlipidemia to PCP.  I did go ahead and refill the Rx today.  She is understanding to follow-up with Dr Melo as scheduled.

## 2025-05-15 ENCOUNTER — RESULTS FOLLOW-UP (OUTPATIENT)
Dept: GYNECOLOGIC ONCOLOGY | Facility: CLINIC | Age: 75
End: 2025-05-15
Payer: MEDICARE

## 2025-05-15 ENCOUNTER — TELEPHONE (OUTPATIENT)
Dept: GYNECOLOGIC ONCOLOGY | Facility: CLINIC | Age: 75
End: 2025-05-15
Payer: MEDICARE

## 2025-05-15 NOTE — TELEPHONE ENCOUNTER
"  Caller: Lyndsey Tavares \"Eugenia\"    Relationship: Self    Best call back number: [phone]    What is the best time to reach you: ANYTIME     Who are you requesting to speak with (clinical staff, provider,  specific staff member): CLINICAL    What was the call regarding: PT RECEIVED A CALL WITH HER RESULTS FROM HER CT SCAN FROM 5-9 AND SHE WANTS TO ASK WHEN [facility] WILL [facility]        "

## 2025-05-15 NOTE — TELEPHONE ENCOUNTER
Patient notified of providers comments for neg CT scan with verbalized understanding and offers thank you.

## 2025-05-15 NOTE — TELEPHONE ENCOUNTER
----- Message from Amari Casillas sent at 5/15/2025  1:25 PM EDT -----  Please give the patient the following message:  ----- Results -----CT without metastatic diseaes.  ----- Message -----  From: Interface, Rad Results [place] In  Sent: 5/14/2025  11:34 AM EDT  To: Shannan Bess MD

## 2025-05-15 NOTE — TELEPHONE ENCOUNTER
"RN returned pt call.  Pt inquiring when her scan will be released to her mychart.  RN advised her it is scheduled to be released at 1134 on 5/17 and APRN stated she would release it now.  Pt verbalized understanding and very appreciative of call.      Caller: Lyndsey Tavares \"Eugenia\"    Relationship: Self    Best call back number: [phone]    What is the best time to reach you: ANYTIME     Who are you requesting to speak with (clinical staff, provider,  specific staff member): CLINICAL    What was the call regarding: PT RECEIVED A CALL WITH HER RESULTS FROM HER CT SCAN FROM 5-9 AND SHE WANTS TO ASK WHEN THEY WILL BE RELEASED TO HER MYCHART.        "

## 2025-05-20 ENCOUNTER — OFFICE VISIT (OUTPATIENT)
Dept: FAMILY MEDICINE CLINIC | Facility: CLINIC | Age: 75
End: 2025-05-20
Payer: MEDICARE

## 2025-05-20 VITALS
HEIGHT: 59 IN | HEART RATE: 77 BPM | SYSTOLIC BLOOD PRESSURE: 116 MMHG | DIASTOLIC BLOOD PRESSURE: 68 MMHG | BODY MASS INDEX: 20.72 KG/M2 | TEMPERATURE: 97.5 F | RESPIRATION RATE: 16 BRPM | OXYGEN SATURATION: 97 % | WEIGHT: 102.8 LBS

## 2025-05-20 DIAGNOSIS — E04.1 THYROID NODULE: ICD-10-CM

## 2025-05-20 DIAGNOSIS — I10 ESSENTIAL HYPERTENSION: Primary | ICD-10-CM

## 2025-05-20 DIAGNOSIS — N18.32 STAGE 3B CHRONIC KIDNEY DISEASE: ICD-10-CM

## 2025-05-20 DIAGNOSIS — Z85.43 HISTORY OF OVARIAN CANCER: ICD-10-CM

## 2025-05-20 DIAGNOSIS — L98.9 SKIN LESION: ICD-10-CM

## 2025-06-04 NOTE — PROGRESS NOTES
"Lyndsey Tavares     VITALS: Blood pressure 116/68, pulse 77, temperature 97.5 °F (36.4 °C), temperature source Temporal, resp. rate 16, height 149.9 cm (59\"), weight 46.6 kg (102 lb 12.8 oz), SpO2 97%, not currently breastfeeding.    Subjective  Chief Complaint  Hypertension and Chronic Kidney Disease    Subjective          History of Present Illness:    History of Present Illness  The patient is a 74-year-old female with medical conditions significant for hypertension who presents to the clinic for a medical follow-up. She reports satisfactory control of her blood pressure at home and maintains a low-sodium diet, primarily consuming water and avoiding protein drinks.    She expresses concern about her blood glucose levels, suspecting them to be elevated. She admits to dietary indiscretions, consuming foods not recommended for her condition. She has not previously undergone an A1c test.    She has a history of ovarian cancer with metastasis and has undergone chemotherapy and Avastin treatment, which she believes may have contributed to her current health status. She recalls feeling well until the initiation of Avastin therapy.    She reports a lesion on her leg, which has since dropped off, leaving a spot. She is advised to monitor the spot for any changes.    She has an upcoming mammogram scheduled in 07/2025 or 08/2025. Her last colonoscopy revealed no polyps, a first in her medical history. She has previously undergone thyroid surgery, which yielded negative results.    PAST SURGICAL HISTORY:  - Thyroid surgery with negative results  - Chemotherapy  - Avastin treatment    No complaints regarding medications.     The following portions of the patient's history were reviewed and updated as appropriate: allergies, current medications, past family history, past medical history, past social history, past surgical history and problem list.    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/2023 DX Regulatory Update      Ovarian cancer 2018    chemotherapy     Renal insufficiency 02/2024    Vitamin B12 deficiency 02/05/2018    Vitamin D deficiency 02/05/2018    Wears glasses        Surgical History  Past Surgical History:   Procedure Laterality Date    APPENDECTOMY      possibly with tubal (not certain)    BREAST BIOPSY Bilateral 1972    cysts removed     COLONOSCOPY      EXPLORATORY LAPAROTOMY, TOTAL ABDOMINAL HYSTERECTOMY SALPINGO OOPHORECTOMY N/A 09/11/2018    Procedure: LAPAROTOMY EXPLORATORY, TOTAL ABDOMINAL HYSTERECTOMY, BILATERAL SALPINGO OOPHORECTOMY, DEBULKING;  Surgeon: Shannan Bess MD;  Location: [facility];  Service: Gynecology    HERNIA REPAIR Right     inguinal     SUBLINGUAL SALIVARY CYST EXCISION Right 2020    at [facility]    TUBAL ABDOMINAL LIGATION         Family History  Family History   Adopted: Yes   Family history unknown: Yes       Social History  Social History     Socioeconomic History    Marital status:    Tobacco Use    Smoking status: Never     Passive exposure: Never    Smokeless tobacco: Never   Vaping Use    Vaping status: Never Used   Substance and Sexual Activity    Alcohol use: No    Drug use: Never    Sexual activity: Not Currently     Partners: Male       Objective   Vital Signs:   /68 (BP Location: Right arm, Patient Position: Sitting, Cuff Size: Adult)   Pulse 77   Temp 97.5 °F (36.4 °C) (Temporal)   Resp 16   Ht 149.9 cm (59\")   Wt 46.6 kg (102 lb 12.8 oz)   SpO2 97%   BMI 20.76 kg/m²       Physical Exam     Physical Exam  Respiratory: Clear to auscultation, no wheezing, rales or rhonchi    Gen: Patient in NAD. Pleasant and answers appropriately. A&Ox3.    Skin: Warm and dry with normal turgor. No purpura, rashes, or unusual pigmentation noted. Hair is normal in appearance and " distribution.    HEENT: NC/AT. No lesions noted. Conjunctiva clear, sclera nonicteric. PERRL. EOMI without nystagmus or strabismus. Fundi appear benign. No hemorrhages or exudates of eyes. Auditory canals are patent bilaterally without lesions. TMs intact,  nonerythematous, nonbulging without lesions. Nasal mucosa pink, nonerythematous, and nonedematous. Frontal and maxillary sinuses are nontender. O/P nonerythematous and moist without exudate.    Neck: Supple without lymph nodes palpated. FROM. No carotid bruits appreciated bilaterally.    Lungs: CTA B/L without rales, rhonchi, crackles, or wheezes.    Heart: RRR. S1 and S2 normal. No S3 or S4. No MRGT.    Abd: Soft, nontender,nondistended. (+)BSx4 quadrants.     Extrem: No CCE. Radial pulses 2+/4 and equal B/L. FROMx4. No bone, joint, or muscle tenderness noted.    Neuro: No focal motor/sensory deficits.    Procedures    Result Review :   The following data was reviewed by: Anais Melo MD on 05/20/2025:       Results  Labs  Lab Results   Component Value Date    GLUCOSE 149 (H) 04/21/2025    BUN 13 04/21/2025    CREATININE 0.99 04/21/2025     04/21/2025    K 4.0 04/21/2025     04/21/2025    CALCIUM 9.6 04/21/2025    PROTEINTOT 7.0 04/21/2025    ALBUMIN 4.2 04/21/2025    ALT 16 04/21/2025    AST 20 04/21/2025    ALKPHOS 25 (L) 04/21/2025    BILITOT 0.3 04/21/2025    GLOB 2.8 04/21/2025    AGRATIO 1.5 04/21/2025    BCR 13.1 04/21/2025    ANIONGAP 8.0 04/21/2025    EGFR 60.0 (L) 04/21/2025       Imaging   - May 2025 CT chest scan: No abnormalities           Assessment and Plan      Lyndsey Tavares is a 74 y.o. here for medical followup.    Diagnoses and all orders for this visit:    1. Essential hypertension (Primary)    2. History of ovarian cancer    3. Stage 3b chronic kidney disease    4. Skin lesion    5. Thyroid nodule        Assessment & Plan  1. Hypertension.  - Blood pressure readings are within the normal range today.  - Advised to  continue monitoring blood pressure at home and maintain current medication regimen.  - Blood pressure is reported to be good at home.  - Follow-up in 3 months.    2. History of Ovarian cancer with metastasis.  - Recent CT scan results are satisfactory.  - Cancer marker levels are within the normal range.  - Advised to continue current treatment plan and follow up with oncologist as scheduled.  - Kidney function rechecked and is normal.    3. Stage 3b CKD.  - Kidney function is currently stable.  - Importance of regular monitoring due to age and history of cancer treatments.  - Advised to stay hydrated, especially during summer months, and avoid protein drinks.  - Regular kidney function tests will be scheduled.    4. Blood glucose management.  - Blood glucose levels were slightly elevated during the last check, likely due to recent food intake.  - Not diabetic or prediabetic.  - Advised to maintain a balanced diet and monitor blood sugar levels.  - Further evaluation if fasting blood sugar exceeds 126 mg/dL or postprandial blood sugar exceeds 200 mg/dL.    5. Thyroid nodule monitoring.  - History of thyroid nodules with consistently normal thyroid function tests.  - Recommended repeat ultrasound of the thyroid due to cancer history.  - Patient prefers to wait at this time.  - Advised to report symptoms such as difficulty swallowing, coughing, fatigue, hair loss, or irregular heartbeat immediately.    6. Skin lesion.  - Lesion on leg has dropped off, leaving a spot.  - Advised to monitor the spot for changes in size, color, or border.  - Referral to dermatology if any changes are observed for further evaluation and potential excision.    Follow-up  The patient will follow up in 3 months.      BMI is within normal parameters. No other follow-up for BMI required.        Patient or patient representative verbalized consent for the use of Ambient Listening during the visit with  Anais Melo MD for chart  documentation. 6/3/2025  23:41 EDT      I spent 34 total minutes, face-to-face, caring for Lyndsey today. 80% of this time involved counseling and/or coordination of care as documented within this note.    Follow Up   Return in about 3 months (around 8/20/2025).  Findings and plans discussed with patient who verbalizes understanding and agreement. Will followup with patient once results are in. Patient was given instructions and counseling regarding her condition or for health maintenance advice. Please see specific information pulled into the AVS if appropriate.       Anais Melo MD

## 2025-06-25 ENCOUNTER — HOSPITAL ENCOUNTER (OUTPATIENT)
Dept: ONCOLOGY | Facility: HOSPITAL | Age: 75
Discharge: HOME | End: 2025-06-25
Admitting: NURSE PRACTITIONER
Payer: MEDICARE

## 2025-06-25 VITALS
HEART RATE: 73 BPM | RESPIRATION RATE: 16 BRPM | BODY MASS INDEX: 20.76 KG/M2 | DIASTOLIC BLOOD PRESSURE: 71 MMHG | HEIGHT: 59 IN | TEMPERATURE: 97 F | SYSTOLIC BLOOD PRESSURE: 146 MMHG | WEIGHT: 103 LBS

## 2025-06-25 DIAGNOSIS — C56.3 OVARIAN CANCER, BILATERAL: Primary | ICD-10-CM

## 2025-06-25 PROCEDURE — 96523 IRRIG DRUG DELIVERY DEVICE: CPT

## 2025-06-25 PROCEDURE — 25010000002 HEPARIN LOCK FLUSH PER 10 UNITS: Performed by: NURSE PRACTITIONER

## 2025-06-25 RX ORDER — HEPARIN SODIUM (PORCINE) LOCK FLUSH IV SOLN 100 UNIT/ML 100 UNIT/ML
500 SOLUTION INTRAVENOUS AS NEEDED
Status: DISCONTINUED | OUTPATIENT
Start: 2025-06-25 | End: 2025-06-26 | Stop reason: HOSPADM

## 2025-06-25 RX ORDER — HEPARIN SODIUM (PORCINE) LOCK FLUSH IV SOLN 100 UNIT/ML 100 UNIT/ML
500 SOLUTION INTRAVENOUS AS NEEDED
OUTPATIENT
Start: 2025-07-01

## 2025-06-25 RX ORDER — SODIUM CHLORIDE 0.9 % (FLUSH) 0.9 %
10 SYRINGE (ML) INJECTION AS NEEDED
OUTPATIENT
Start: 2025-07-01

## 2025-06-25 RX ADMIN — HEPARIN 500 UNITS: 100 SYRINGE at 11:48

## 2025-07-02 RX ORDER — LISINOPRIL 10 MG/1
10 TABLET ORAL DAILY
Qty: 90 TABLET | Refills: 0 | Status: SHIPPED | OUTPATIENT
Start: 2025-07-02

## 2025-07-22 ENCOUNTER — LAB (OUTPATIENT)
Dept: LAB | Facility: HOSPITAL | Age: 75
End: 2025-07-22
Payer: MEDICARE

## 2025-07-22 ENCOUNTER — TRANSCRIBE ORDERS (OUTPATIENT)
Dept: ADMINISTRATIVE | Facility: HOSPITAL | Age: 75
End: 2025-07-22
Payer: MEDICARE

## 2025-07-22 DIAGNOSIS — N18.32 CHRONIC KIDNEY DISEASE (CKD) STAGE G3B/A1, MODERATELY DECREASED GLOMERULAR FILTRATION RATE (GFR) BETWEEN 30-44 ML/MIN/1.73 SQUARE METER AND ALBUMINURIA CREATININE RATIO LESS THAN 30 MG/G (CMS/H*: Primary | ICD-10-CM

## 2025-07-22 DIAGNOSIS — N18.32 CHRONIC KIDNEY DISEASE (CKD) STAGE G3B/A1, MODERATELY DECREASED GLOMERULAR FILTRATION RATE (GFR) BETWEEN 30-44 ML/MIN/1.73 SQUARE METER AND ALBUMINURIA CREATININE RATIO LESS THAN 30 MG/G (CMS/H*: ICD-10-CM

## 2025-07-22 LAB
ALBUMIN SERPL-MCNC: 4.1 G/DL (ref 3.5–5.2)
ALBUMIN UR-MCNC: 4 MG/DL
ALBUMIN/GLOB SERPL: 1.3 G/DL
ALP SERPL-CCNC: 22 U/L (ref 39–117)
ALT SERPL W P-5'-P-CCNC: 14 U/L (ref 1–33)
ANION GAP SERPL CALCULATED.3IONS-SCNC: 11 MMOL/L (ref 5–15)
AST SERPL-CCNC: 24 U/L (ref 1–32)
BACTERIA UR QL AUTO: NORMAL /HPF
BILIRUB SERPL-MCNC: 0.4 MG/DL (ref 0–1.2)
BILIRUB UR QL STRIP: NEGATIVE
BUN SERPL-MCNC: 9 MG/DL (ref 8–23)
BUN/CREAT SERPL: 10 (ref 7–25)
CALCIUM SPEC-SCNC: 9.9 MG/DL (ref 8.6–10.5)
CHLORIDE SERPL-SCNC: 99 MMOL/L (ref 98–107)
CLARITY UR: CLEAR
CO2 SERPL-SCNC: 26 MMOL/L (ref 22–29)
COLOR UR: YELLOW
CREAT SERPL-MCNC: 0.9 MG/DL (ref 0.57–1)
CREAT UR-MCNC: 17.3 MG/DL
CREAT UR-MCNC: 17.3 MG/DL
EGFRCR SERPLBLD CKD-EPI 2021: 67.2 ML/MIN/1.73
GLOBULIN UR ELPH-MCNC: 3.1 GM/DL
GLUCOSE SERPL-MCNC: 79 MG/DL (ref 65–99)
GLUCOSE UR STRIP-MCNC: NEGATIVE MG/DL
HGB UR QL STRIP.AUTO: NEGATIVE
HYALINE CASTS UR QL AUTO: NORMAL /LPF
KETONES UR QL STRIP: NEGATIVE
LEUKOCYTE ESTERASE UR QL STRIP.AUTO: NEGATIVE
MICROALBUMIN/CREAT UR: 231.2 MG/G (ref 0–29)
NITRITE UR QL STRIP: NEGATIVE
PH UR STRIP.AUTO: 7 [PH] (ref 5–8)
POTASSIUM SERPL-SCNC: 4.1 MMOL/L (ref 3.5–5.2)
PROT ?TM UR-MCNC: 7.2 MG/DL
PROT SERPL-MCNC: 7.2 G/DL (ref 6–8.5)
PROT UR QL STRIP: NEGATIVE
PROT/CREAT UR: 416.2 MG/G CREA (ref 0–200)
RBC # UR STRIP: NORMAL /HPF
REF LAB TEST METHOD: NORMAL
SODIUM SERPL-SCNC: 136 MMOL/L (ref 136–145)
SP GR UR STRIP: <=1.005 (ref 1–1.03)
SQUAMOUS #/AREA URNS HPF: NORMAL /HPF
UROBILINOGEN UR QL STRIP: NORMAL
WBC # UR STRIP: NORMAL /HPF

## 2025-07-22 PROCEDURE — 36415 COLL VENOUS BLD VENIPUNCTURE: CPT

## 2025-07-22 PROCEDURE — 84156 ASSAY OF PROTEIN URINE: CPT

## 2025-07-22 PROCEDURE — 81001 URINALYSIS AUTO W/SCOPE: CPT

## 2025-07-22 PROCEDURE — 80053 COMPREHEN METABOLIC PANEL: CPT

## 2025-07-22 PROCEDURE — 82570 ASSAY OF URINE CREATININE: CPT

## 2025-07-22 PROCEDURE — 82043 UR ALBUMIN QUANTITATIVE: CPT

## 2025-08-11 PROBLEM — Z85.43 HISTORY OF OVARIAN CANCER: Status: ACTIVE | Noted: 2025-08-11

## 2025-08-12 ENCOUNTER — OFFICE VISIT (OUTPATIENT)
Dept: GYNECOLOGIC ONCOLOGY | Facility: CLINIC | Age: 75
End: 2025-08-12
Payer: MEDICARE

## 2025-08-12 ENCOUNTER — HOSPITAL ENCOUNTER (OUTPATIENT)
Dept: ONCOLOGY | Facility: HOSPITAL | Age: 75
Discharge: HOME | End: 2025-08-12
Payer: MEDICARE

## 2025-08-12 ENCOUNTER — INFUSION (OUTPATIENT)
Facility: HOSPITAL | Age: 75
End: 2025-08-12
Payer: MEDICARE

## 2025-08-12 ENCOUNTER — LAB (OUTPATIENT)
Dept: LAB | Facility: HOSPITAL | Age: 75
End: 2025-08-12
Payer: MEDICARE

## 2025-08-12 VITALS
TEMPERATURE: 97.3 F | BODY MASS INDEX: 20.91 KG/M2 | RESPIRATION RATE: 17 BRPM | HEART RATE: 66 BPM | OXYGEN SATURATION: 97 % | SYSTOLIC BLOOD PRESSURE: 158 MMHG | HEIGHT: 59 IN | DIASTOLIC BLOOD PRESSURE: 75 MMHG | WEIGHT: 103.7 LBS

## 2025-08-12 VITALS
DIASTOLIC BLOOD PRESSURE: 75 MMHG | HEIGHT: 59 IN | BODY MASS INDEX: 20.96 KG/M2 | HEART RATE: 75 BPM | TEMPERATURE: 96.9 F | RESPIRATION RATE: 18 BRPM | WEIGHT: 104 LBS | SYSTOLIC BLOOD PRESSURE: 159 MMHG

## 2025-08-12 DIAGNOSIS — C56.3 OVARIAN CANCER, BILATERAL: Primary | ICD-10-CM

## 2025-08-12 DIAGNOSIS — Z85.43 HISTORY OF OVARIAN CANCER: ICD-10-CM

## 2025-08-12 DIAGNOSIS — Z85.43 HISTORY OF OVARIAN CANCER: Primary | ICD-10-CM

## 2025-08-12 LAB — CANCER AG125 SERPL QL: 26.9 U/ML (ref 0–38.1)

## 2025-08-12 PROCEDURE — 36415 COLL VENOUS BLD VENIPUNCTURE: CPT

## 2025-08-12 PROCEDURE — 96523 IRRIG DRUG DELIVERY DEVICE: CPT

## 2025-08-12 PROCEDURE — G0463 HOSPITAL OUTPT CLINIC VISIT: HCPCS

## 2025-08-12 PROCEDURE — 86304 IMMUNOASSAY TUMOR CA 125: CPT

## 2025-08-12 PROCEDURE — 25010000002 HEPARIN LOCK FLUSH PER 10 UNITS: Performed by: NURSE PRACTITIONER

## 2025-08-12 RX ORDER — HEPARIN SODIUM (PORCINE) LOCK FLUSH IV SOLN 100 UNIT/ML 100 UNIT/ML
500 SOLUTION INTRAVENOUS AS NEEDED
Status: DISCONTINUED | OUTPATIENT
Start: 2025-08-12 | End: 2025-08-12 | Stop reason: HOSPADM

## 2025-08-12 RX ORDER — SODIUM CHLORIDE 0.9 % (FLUSH) 0.9 %
10 SYRINGE (ML) INJECTION AS NEEDED
OUTPATIENT
Start: 2025-10-01

## 2025-08-12 RX ORDER — SODIUM CHLORIDE 0.9 % (FLUSH) 0.9 %
10 SYRINGE (ML) INJECTION AS NEEDED
Status: DISCONTINUED | OUTPATIENT
Start: 2025-08-12 | End: 2025-08-12 | Stop reason: HOSPADM

## 2025-08-12 RX ORDER — LISINOPRIL 5 MG/1
2.5 TABLET ORAL DAILY
COMMUNITY
Start: 2025-07-28

## 2025-08-12 RX ORDER — HEPARIN SODIUM (PORCINE) LOCK FLUSH IV SOLN 100 UNIT/ML 100 UNIT/ML
500 SOLUTION INTRAVENOUS AS NEEDED
OUTPATIENT
Start: 2025-10-01

## 2025-08-12 RX ADMIN — Medication 10 ML: at 10:24

## 2025-08-12 RX ADMIN — HEPARIN 500 UNITS: 100 SYRINGE at 10:26

## 2025-08-21 ENCOUNTER — TELEPHONE (OUTPATIENT)
Dept: FAMILY MEDICINE CLINIC | Facility: CLINIC | Age: 75
End: 2025-08-21
Payer: MEDICARE

## 2025-08-29 ENCOUNTER — HOSPITAL ENCOUNTER (OUTPATIENT)
Dept: ULTRASOUND IMAGING | Facility: HOSPITAL | Age: 75
Discharge: HOME | End: 2025-08-29
Payer: MEDICARE

## 2025-08-29 PROCEDURE — 76536 US EXAM OF HEAD AND NECK: CPT
